# Patient Record
(demographics unavailable — no encounter records)

---

## 2024-10-08 NOTE — ASSESSMENT
[FreeTextEntry1] : Patient is a 71, y/o gentleman, diagnosed with AML s/p one line of therapy.    Patient's history, pathology, imaging, and plan was reviewed. Discussed risks and benefits of allogenetic HSCT transplant at great length based on the ASTCT practice guidelines.   The patient is an excellent candidate for transplant and has good social support. We spoke to them at length regarding their disease, treatment options, and the role of HSCT in the treatment of their AML and the expected morbidity and mortality as well as impact on disease-free, overall survival.   We discussed pre-transplant work up, stem cell donor identification, donor collection of stem cells, conditioning chemotherapy, GvHD prophylaxis, allogeneic stem cell transplant hospital admission, expected hospital course, side effects of treatment, discharge medication, risk of relapse and death post-transplant, post-transplant outpatient follow ups and precautions.     The patient has verbalized understanding, their questions have been answered. At this time, [place] would like to proceed with HSCT transplant. We encouraged the patient to take the time to read our transplant book. They will be referred to our  for a psycho/social evaluation prior to transplant. HLA typing for the patient was not performed during the initial consult. We will refer the patient for dental evaluation and clearance.     Completed cycle 2 of dac/leonila, cycle 3 pending count recovery. HSCT admission planned for 11/5 for MUD transplant, ABC trial discussed at length.  The patient verbalized understanding of the plan. We will reach out to their primary hematologist, Dr. Anderson with our recommendations. The patient was encouraged to contact us with any questions or concerns.     Follow-up:   RTC 3 wks  ------   Seen and examined with [facility]   Adult Transplantation and Cellular Therapy Program   I had the pleasure of seeing the patient.  I reviewed the interim history I reviewed his records in details and reviewed the above note.  The patient has been doing well and tolerating his second cycle of therapy.  We have identified a suitable MUD.  The plan is to proceed with admission on November 5th pending the results of his repeat BM studies.  We discussed today the option of being enrolled in the ABC trial for GvHD prevention.  I answered the patient's questions. I spent 25 minutes performing the above tasks.  MARISABEL Mena MD

## 2024-10-08 NOTE — REVIEW OF SYSTEMS
[Negative] : Psychiatric [Fever] : no fever [Chills] : no chills [Night Sweats] : no night sweats [Fatigue] : no fatigue

## 2024-10-08 NOTE — PHYSICAL EXAM
[Fully active, able to carry on all pre-disease performance without restriction] : Status 0 - Fully active, able to carry on all pre-disease performance without restriction [Normal] : no JVD, no calf tenderness, venous stasis changes, varices [de-identified] : room air

## 2024-10-08 NOTE — REASON FOR VISIT
[Follow-Up Visit] : a follow-up visit for [Acute Myeloid Leukemia] : acute myeloid leukemia [Spouse] : spouse [FreeTextEntry2] : Pretransplant evaluation

## 2024-10-15 NOTE — REASON FOR VISIT
[Bone Marrow Biopsy] : bone marrow biopsy [Bone Marrow Aspiration] : bone marrow aspiration [FreeTextEntry2] : AML

## 2024-10-15 NOTE — PROCEDURE
[Bone Marrow Biopsy] : bone marrow biopsy [Bone Marrow Aspiration] : bone marrow aspiration  [Patient] : the patient [Verbal Consent Obtained] : verbal consent was obtained prior to the procedure [Patient identification verified] : patient identification verified [Procedure verified and consent obtained] : procedure verified and consent obtained [Laterality verified and correct site marked] : laterality verified and correct site marked [Left] : site: left [Correct positioning] : correct positioning [Prone] : prone [Superior iliac spine was identified] : the superior iliac spine was identified. [The left posterior iliac crest was prepped with betadine and draped, using sterile technique.] : The left posterior iliac crest was prepped with betadine and draped, using sterile technique. [Lidocaine was injected and into the periosteum overlying the site.] : Lidocaine was injected and into the periosteum overlying the site. [Aspirate] : aspirate [Cytogenetics] : cytogenetics [FISH] : FISH [Biopsy] : biopsy [Flow Cytometry] : flow cytometry [] : The patient was instructed to remove the bandage the following AM. The patient may bathe. Acetaminophen may be taken for discomfort, as per package directions.If there are any other problems, the patient was instructed to call the office. The patient verbalized understanding, and is aware of the office contact numbers. [FreeTextEntry1] : AML [FreeTextEntry2] : 8 cc of lidocaine was used for the procedure.   WBC: 4.58 Hgb: 10.5 Hct: 29.4 Plts: 38  Bone marrow aspiration and biopsy were done. MDS, AML and [facility] Myeloid panel requested

## 2024-10-29 NOTE — PHYSICAL EXAM
[Fully active, able to carry on all pre-disease performance without restriction] : Status 0 - Fully active, able to carry on all pre-disease performance without restriction [Normal] : affect appropriate [de-identified] : room air

## 2024-10-29 NOTE — HISTORY OF PRESENT ILLNESS
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 He is being treated with decitabine and venetoclax, cycle 2 started 8/26/24. He presents to finalize plans for an allogeneic HSCT in the management of their AML, s/p one line of treatment. Patient is accompanied by his wife.   ----------------------PmHX:----------------------------------------  BPH  --------------------Oncologic Hx: ---------------------------------  He had been well, exercising regularly, three miles/day. By early 7/2024, he had developed NGUYEN on climbing stairs. Pancytopenia with circulating blasts were detected and he was admitted to [facility] 7/18/2024. 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  --------------------Interval Hx: -------------------------------------  Patient is feeling well overall, walking on the track 2-3 miles/day. 9/17/24: Patient is feeling well, he's walking three miles/day, 19 minute mile pace.  S/p 3 cycles of therapy  Denies n/v/d, fever, rash, chills, pain.   --------------------Treatment Hx: ---------------------------------  Decitabine and venetoclax 100mg 21d   ------------------Imaging Hx:---------------------------------------  CT C/A/P showed small, non specific lung nodules and prostatomegaly.  --------------Bone marrow Biopsy Results: ------------------  Bone Marrow Biopsy, Clot and Bone Marrow Aspirate, Right PIC -MDS/AML with mutated TP53 ( per International Consensus  Classification of Myeloid Neoplasms and Acute Leukemias) -MDS with biallelic TP53 inactivation (WHO-RVVC1pf ed). Hypercellular marrow for age with multilineage dysplasia, and increased blasts/immature cells (12% on aspirate differential count, 10-15% by CD34 IHC stain). -Abnormal Karyotype 46-47,XY,del(7)(q11.2),+8,del(8)(q13q22)x2,del(9)(q13q22),-11,-12, add(17)(p11.2),del(20)(q11.2q13.3),+1-2mar           {cp13                         \}/46,XY           {7                         \} -FISH studies detected 7q deletion (60%), Trisomy 8 (62.5%), 20q deletion (59.5%) and TP53 deletion (55.5%). -[facility] Myeloid Report detected (Tier I) TP53 p.Cig463Uip (VAF38%) and U2AF1 p.Siu006Wug (VAF32%). There are increased blasts/immature cells (12% on aspirate differential count and approximately 10-15% of the cellularity by CD34 immunohistochemical stain).  8/15/24:  Bone marrow biopsy and bone marrow aspirate      -  Cellular marrow (30-40%) with  markedly decreased myelopoiesis, decreased left shifted erythropoiesis and relatively normal megakaryopoiesis with no increase in blast population     -  Persistent 7q deletion (1%), trisomy 8 (2.5%), 20q deletion (2.5%) and TP53 deletion (0.5%)  by FISH studies, below the cut-off values    _ Persistent mutations in    TP53 p.Ajr705Nkt and U2AF1 p.Hwf450Hvq at low VAF levels  ----------------------Social Hx: ------------------------------------   The patient is  and has 2 daughters, 30 and 27y/o local in [place]  Patient has one brother (69)  Work: Nephrologist  Born in:   Never a smoker/Social alcohol use   ------------------Surgical History: -------------------------------  partial mastoidectomy and requires periodic left mastoid debridement.  -----------------Past family history:------------------------------  noncontributory

## 2024-10-29 NOTE — HISTORY OF PRESENT ILLNESS
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 He is being treated with decitabine and venetoclax, cycle 2 started 8/26/24. He presents to finalize plans for an allogeneic HSCT in the management of their AML, s/p one line of treatment. Patient is accompanied by his wife.   ----------------------PmHX:----------------------------------------  BPH  --------------------Oncologic Hx: ---------------------------------  He had been well, exercising regularly, three miles/day. By early 7/2024, he had developed NGUYEN on climbing stairs. Pancytopenia with circulating blasts were detected and he was admitted to [facility] 7/18/2024. 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  --------------------Interval Hx: -------------------------------------  Patient is feeling well overall, walking on the track 2-3 miles/day. 9/17/24: Patient is feeling well, he's walking three miles/day, 19 minute mile pace.  S/p 3 cycles of therapy  Denies n/v/d, fever, rash, chills, pain.   --------------------Treatment Hx: ---------------------------------  Decitabine and venetoclax 100mg 21d   ------------------Imaging Hx:---------------------------------------  CT C/A/P showed small, non specific lung nodules and prostatomegaly.  --------------Bone marrow Biopsy Results: ------------------  Bone Marrow Biopsy, Clot and Bone Marrow Aspirate, Right PIC -MDS/AML with mutated TP53 ( per International Consensus  Classification of Myeloid Neoplasms and Acute Leukemias) -MDS with biallelic TP53 inactivation (WHO-NJIF1qd ed). Hypercellular marrow for age with multilineage dysplasia, and increased blasts/immature cells (12% on aspirate differential count, 10-15% by CD34 IHC stain). -Abnormal Karyotype 46-47,XY,del(7)(q11.2),+8,del(8)(q13q22)x2,del(9)(q13q22),-11,-12, add(17)(p11.2),del(20)(q11.2q13.3),+1-2mar           {cp13                         \}/46,XY           {7                         \} -FISH studies detected 7q deletion (60%), Trisomy 8 (62.5%), 20q deletion (59.5%) and TP53 deletion (55.5%). -On\[facility]\"" Advanced NGS Myeloid Report detected (Tier I) TP53 p.Izp266Hdj (VAF38%) and U2AF1 p.Vik265Rio (VAF32%). There are increased blasts/immature cells (12% on aspirate differential count and approximately 10-15% of the cellularity by CD34 immunohistochemical stain).  8/15/24:  Bone marrow biopsy and bone marrow aspirate      -  Cellular marrow (30-40%) with  markedly decreased myelopoiesis, decreased left shifted erythropoiesis and relatively normal megakaryopoiesis with no increase in blast population     -  Persistent 7q deletion (1%), trisomy 8 (2.5%), 20q deletion (2.5%) and TP53 deletion (0.5%)  by FISH studies, below the cut-off values    _ Persistent mutations in    TP53 p.Ugb317Seq and U2AF1 p.Jxa828Zgb at low VAF levels  ----------------------Social Hx: ------------------------------------   The patient is  and has 2 daughters, 30 and 27y/o local in [place]  Patient has one brother (69)  Work: Nephrologist  Born in:   Never a smoker/Social alcohol use   ------------------Surgical History: -------------------------------  partial mastoidectomy and requires periodic left mastoid debridement.  -----------------Past family history:------------------------------  noncontributory

## 2024-10-29 NOTE — PHYSICAL EXAM
[Fully active, able to carry on all pre-disease performance without restriction] : Status 0 - Fully active, able to carry on all pre-disease performance without restriction [Normal] : affect appropriate [de-identified] : room air

## 2024-10-30 NOTE — ASSESSMENT
[FreeTextEntry1] :   Patient is starting conditioning in preparation for allogeneic HSC transplant for treatment of disease.   Donor type: MUD            Mismatches: DP non permissive            Stem cell source: PB   CMV Status           Recipient: +           Donor: -   ABO Recipient: A+ Donor: O+ Incompatibility: Minor   The planned conditioning regimen is: Flu Bu 2 (LIV) rATG   The planned GvHD prophylaxis is: ABC   The indication per ASTCT guidelines is: S   In terms of disease staging, the patient has received treatment and achieved disease status at transplant; CR1 Minimal residual disease marker: None Planned therapy post-transplant: None   The Karnofsky performance status is: 80% Comorbid conditions: None The pre transplant HCT CI score is: 0 DRI: High   The patient has been counseled with regard to fertility issues: Yes With regard to contraception and uterine bleeding prevention during post-transplant period, the issues has been addressed: N/A   I have signed the checklist. I have verified that the allogeneic source of HSCT has been secured and cleared. The rationale to proceed has been discussed with the patient. The expected morbidity and mortality were discussed. The expected outcomes have been discussed. The patient understands the needs for compliance. The informed consent has been obtained. The insurance preauthorization has been obtained. [facility]. MARISABEL Mena MD

## 2024-10-30 NOTE — ASSESSMENT
[FreeTextEntry1] :   Patient is starting conditioning in preparation for allogeneic HSC transplant for treatment of disease.   Donor type: MUD            Mismatches: DP non permissive            Stem cell source: PB   CMV Status           Recipient: +           Donor: -   ABO Recipient: A+ Donor: O+ Incompatibility: Minor   The planned conditioning regimen is: Flu Bu 2 (LIV) rATG   The planned GvHD prophylaxis is: ABC   The indication per ASTCT guidelines is: S   In terms of disease staging, the patient has received treatment and achieved disease status at transplant; CR1 Minimal residual disease marker: None Planned therapy post-transplant: None   The Karnofsky performance status is: 80% Comorbid conditions: None The pre transplant HCT CI score is: 0 DRI: High   The patient has been counseled with regard to fertility issues: Yes With regard to contraception and uterine bleeding prevention during post-transplant period, the issues has been addressed: N/A   I have signed the checklist. I have verified that the allogeneic source of HSCT has been secured and cleared. The rationale to proceed has been discussed with the patient. The expected morbidity and mortality were discussed. The expected outcomes have been discussed. The patient understands the needs for compliance. The informed consent has been obtained. The insurance preauthorization has been obtained. [facility]. MARISABEL Mena MD

## 2024-11-29 NOTE — ASSESSMENT
[FreeTextEntry1] : 72 yo with pmhx of AML,  s/p HSCT transplant on 11/12/24. Today is day +17   Disease:  --> AML s/p allogeneic stem cell transplant (MUD) ----------Ds status post transplant: pending  ------------------By MRD monitoring: N/A ------------------By post Chimerism: N/A ----------Post transplant maintenance therapy: N/A ------------------Indication: N/A ----------DLI given:  Disease monitoring: --> Post transplant bone marrow biopsy will need to be scheduled on Day +30, Day+ 100, and Day +180.   Engraftment: ------Chimerism: pending from 11/26/24 ------ANC engraftment date: 11/26/24 ------Platelets engraftment date: Likely 11/28/24, pending 2 additional measurements  ------ABO Rh incompatibility issues:  -->Plan: ------G-CSF support: no ------Transfusions requirements: No ----------------Transfuse if platelets <10K or actively bleeding per SOP. ------Chimerism: monitor every other week and on marrow until d+180, last performed 11/26 ------Immune reconstitution due on days +100, + 180, + 365   GvHD: -------Acute: No -------Chronic: N/A -------Steroid Taper: N/A -------ECP: N/A -->Plan: ------Reviewed GvHD signs and symptoms to report ------Prophylaxis with ABCregimen. -----------PTcy -----------Abatacept IV 10mg/kg on days +28 (12/10)    ID: ---Monitoring: Send CMV PCR, Adenovirus and EBV weekly until day +180 ----------CMV:neg; will repeat 12/2 ----------EBV: will repeat 12/2 ----------Adeno: will repeat 12/2 --->Continue ppx: ---------PCP: bactrim  ---------HSV and VZV: acyclovir 800mg PO BID  ---------Fungal: posaconazole 300mg PO daily  ---------CMV: letermovir 480 mg PO daily ---------If chronic GvHD present, encapsulated organism coverage: N/A  Survivorship: - 25-hydroxyclaciferol (day +80): - TSH (annually): - Ferritin (annually): - Fertility (annual, x2 if applicable): - Bone mineral densitometry (day +365 for women, men exposed to prolonged steroids and CNI): - Vaccinations per SOP starting at day 180+ except for Flu at day + 90 and COVID per CDC guidelines.   Other: Continue Zofran PRN Ursodiol 300 mg BID ( until day 30-60) for SOS prophylaxis Encouraged to increase PO intake as tolerated Initiated on potassium supplementation - 20 mEq daily; continue as needed Pt has held losartan due to decreased BPs at home; discuss with team to evaluate need for medication over coming weeks  Continue post transplant diet and crowd restrictions. Questions and concerns addressed. Reviewed signs and symptoms to report to clinic; patient has clinic and after hours number  RTC 12/3 labs, hydration as needed 12/5 labs, provider visit with Dr. Bay, hydration prn

## 2024-11-29 NOTE — HISTORY OF PRESENT ILLNESS
[de-identified] : 71 year old s/p HSCT for AML    Status post allogeneic transplant, day +17 Transplant physician: Dr. Bay  Referring physician:  Diagnosis: AML  Disease staging at transplant: CR Conditioning regimen: LIV BuFluATG GvHD prophylaxis: PTCyBorAba (ABC) on study  Donor type: MUD                    Mismatches: N/A, 10/10                    Stem cell source: Peripheral  ABO         Recipient: A positive         Donor: O positive          Incompatibility: Minor  CMV Status  Recipient: Positive  Donor: Negative Toxoplasmosis Status  Recipient: Negative   Donor: Negative    PMH:  BPH, AML     -----------------Oncologic Hx:-------------------------------- He had been well, exercising regularly, three miles/day. By early 7/2024, he had developed NGUYEN on climbing stairs. Pancytopenia with circulating blasts were detected and he was admitted to [facility] 7/18/2024. 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 He was treated with decitabine and venetoclax x 3 cycles.    -----------Transplant hospital course:---------------------  Tolerated well, c/b increased BP due to post transplant cytoxan fluids. Additionally c/b hematuria, interstitial mucositis.  -----Day 0 = 11/12/24  --------------Post Transplant Course:-----------------------     -------------Oncological treatments:-------------------------- Decitabine and venetoclax 100mg 21d      ---------------Bone Marrow Bx Results:---------------------- Bone Marrow Biopsy, Clot and Bone Marrow Aspirate, Right PIC -MDS/AML with mutated TP53 ( per International Consensus  Classification of Myeloid Neoplasms and Acute Leukemias) -MDS with biallelic TP53 inactivation (WHO-RHPN9mh ed). Hypercellular marrow for age with multilineage dysplasia, and increased blasts/immature cells (12% on aspirate differential count, 10-15% by CD34 IHC stain). -Abnormal Karyotype 46-47,XY,del(7)(q11.2),+8,del(8)(q13q22)x2,del(9)(q13q22),-11,-12, add(17)(p11.2),del(20)(q11.2q13.3),+1-2mar  {cp13   }/46,XY  {7   } -FISH studies detected 7q deletion (60%), Trisomy 8 (62.5%), 20q deletion (59.5%) and TP53 deletion (55.5%). -[facility] Advanced NGS Myeloid Report detected (Tier I) TP53 p.Qev113Fom (VAF38%) and U2AF1 p.Jmj027Xgu (VAF32%). There are increased blasts/immature cells (12% on aspirate differential count and approximately 10-15% of the cellularity by CD34 immunohistochemical stain).  8/15/24:  Bone marrow biopsy and bone marrow aspirate  - Cellular marrow (30-40%) with markedly decreased myelopoiesis, decreased left shifted erythropoiesis and relatively normal megakaryopoiesis with no increase in blast population  - Persistent 7q deletion (1%), trisomy 8 (2.5%), 20q deletion (2.5%) and TP53 deletion (0.5%) by FISH studies, below the cut-off values  _ Persistent mutations in TP53 p.Vsw833Zmj and U2AF1 p.Otf510Unt at low VAF levels   10/15/24 (Pretransplant BmBx) ----Morphology: Normocellular bone marrow with trilineage regeneration (focal myeloid predominant, focal erythroid predominant, overall decreased megakaryocytes with focally normal numbers and normal morphology, and increased iron stores (history of MDS/AML with complex karyotype and TP53 deletion, under treatment)      - No morphologic or immunophenotypic findings of bone marrow involvement by leukemia are identified ----Karyotype: Normal ----FISH: deletion of TP53 below cut off value  ----NGS: Normal    - Day 30 post transplant ----Morphology: ----Karyotype ----FISH ----NGS:   - Day 100 post transplant ----Morphology: ----Karyotype ----FISH ----NGS:   - Day 180 post transplant ----Morphology: ----Karyotype ----FISH ----NGS:   ---------Past Surgical Hx---------- Partial mastoidectomy and requires periodic left mastoid debridement  -------- -Social Hx ----------------- The patient is  and has 2 daughters, 30 and 27y/o local in [place]  Patient has one brother (69)  Work: Nephrologist  Born in: [place]  Never a smoker/Social alcohol use  ----------Past Family Hx:------------ Noncontributory  [de-identified] : 11/29/24: Presents for follow up post hospital discharge. He reports overall fatigue. He is eating small meals and drinking ~1.5L. He reports improvement with PO intake as well as urination since returning home. He was initiated on losaratan while inpatient but reports holding it since returning home due to improvement in blood pressure.He denies any fever, chills, SOB, new rash, N/V/D. Of note, he did not bring his medications to clinic - advised to bring pill bottles/box to each visit, verbalizes understanding.

## 2024-12-04 NOTE — HISTORY OF PRESENT ILLNESS
[de-identified] : 71 year old s/p HSCT for AML    Status post allogeneic transplant, day +22 Transplant physician: Dr. Bay  Referring physician:  Diagnosis: AML  Disease staging at transplant: CR Conditioning regimen: LIV BuFluATG GvHD prophylaxis: PTCyBorAba (ABC) on study  Donor type: MUD                    Mismatches: N/A, 10/10                    Stem cell source: Peripheral  ABO         Recipient: A positive         Donor: O positive          Incompatibility: Minor  CMV Status  Recipient: Positive  Donor: Negative Toxoplasmosis Status  Recipient: Negative   Donor: Negative    PMH:  BPH, AML     -----------------Oncologic Hx:-------------------------------- He had been well, exercising regularly, three miles/day. By early 7/2024, he had developed NGUYEN on climbing stairs. Pancytopenia with circulating blasts were detected and he was admitted to [facility] 7/18/2024. 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 He was treated with decitabine and venetoclax x 3 cycles.    -----------Transplant hospital course:---------------------  Tolerated well, c/b increased BP due to post transplant cytoxan fluids. Additionally c/b hematuria, interstitial mucositis.  -----Day 0 = 11/12/24  --------------Post Transplant Course:-----------------------     -------------Oncological treatments:-------------------------- Decitabine and venetoclax 100mg 21d      ---------------Bone Marrow Bx Results:---------------------- Bone Marrow Biopsy, Clot and Bone Marrow Aspirate, Right PIC -MDS/AML with mutated TP53 ( per International Consensus  Classification of Myeloid Neoplasms and Acute Leukemias) -MDS with biallelic TP53 inactivation (WHO-OSZC8dh ed). Hypercellular marrow for age with multilineage dysplasia, and increased blasts/immature cells (12% on aspirate differential count, 10-15% by CD34 IHC stain). -Abnormal Karyotype 46-47,XY,del(7)(q11.2),+8,del(8)(q13q22)x2,del(9)(q13q22),-11,-12, add(17)(p11.2),del(20)(q11.2q13.3),+1-2mar   {cp13      }/46,XY   {7      } -FISH studies detected 7q deletion (60%), Trisomy 8 (62.5%), 20q deletion (59.5%) and TP53 deletion (55.5%). -OnkoSit Advanced NGS Myeloid Report detected (Tier I) TP53 p.Gsc021Hmq (VAF38%) and U2AF1 p.Swj151Fkg (VAF32%). There are increased blasts/immature cells (12% on aspirate differential count and approximately 10-15% of the cellularity by CD34 immunohistochemical stain).  8/15/24:  Bone marrow biopsy and bone marrow aspirate  - Cellular marrow (30-40%) with markedly decreased myelopoiesis, decreased left shifted erythropoiesis and relatively normal megakaryopoiesis with no increase in blast population  - Persistent 7q deletion (1%), trisomy 8 (2.5%), 20q deletion (2.5%) and TP53 deletion (0.5%) by FISH studies, below the cut-off values  _ Persistent mutations in TP53 p.Uox574Ssq and U2AF1 p.Nhv648Rng at low VAF levels   10/15/24 (Pretransplant BmBx) ----Morphology: Normocellular bone marrow with trilineage regeneration (focal myeloid predominant, focal erythroid predominant, overall decreased megakaryocytes with focally normal numbers and normal morphology, and increased iron stores (history of MDS/AML with complex karyotype and TP53 deletion, under treatment)      - No morphologic or immunophenotypic findings of bone marrow involvement by leukemia are identified ----Karyotype: Normal ----FISH: deletion of TP53 below cut off value  ----NGS: Normal    - Day 30 post transplant: Due 12/12/24  ----Morphology: ----Karyotype ----FISH ----NGS:   - Day 100 post transplant: Due 2/20/25 ----Morphology: ----Karyotype ----FISH ----NGS:   - Day 180 post transplant: Due 5/12/25 ----Morphology: ----Karyotype ----FISH ----NGS:   ---------Past Surgical Hx---------- Partial mastoidectomy and requires periodic left mastoid debridement  -------- -Social Hx ----------------- The patient is  and has 2 daughters, 30 and 27y/o local in [place]  Patient has one brother (69)  Work: Nephrologist  Born in:   Never a smoker/Social alcohol use  ----------Past Family Hx:------------ Noncontributory  [de-identified] : 11/29/24: Presents for follow up post hospital discharge. He reports overall fatigue. He is eating small meals and drinking ~1.5L. He reports improvement with PO intake as well as urination since returning home. He was initiated on losaratan while inpatient but reports holding it since returning home due to improvement in blood pressure.He denies any fever, chills, SOB, new rash, N/V/D. Of note, he did not bring his medications to clinic - advised to bring pill bottles/box to each visit, verbalizes understanding.   12/4/24: Presents for a follow up visit, Day +22 post-transplant. Overall, well and offers no acute concerns. He reports overall fatigue. Denies fever, vomiting, diarrhea, rash, mouth sores, nausea or any signs of active bleeding. Denies SOB or B/L LE edema.

## 2024-12-04 NOTE — ASSESSMENT
[FreeTextEntry1] : 72 yo with pmhx of AML,  s/p HSCT transplant on 11/12/24. Today is day +22   Disease:  --> AML s/p allogeneic stem cell transplant (MUD) ----------Ds status post transplant: pending  ------------------By MRD monitoring: N/A ------------------By post Chimerism: 11/26/24 chimerism is greater than 97% donor, recipient not detected. ----------Post transplant maintenance therapy: N/A ------------------Indication: N/A ----------DLI given: N/A Disease monitoring: --> Post transplant bone marrow biopsy will need to be scheduled on Day +30 ( 12/12/24), Day+ 100 (2/20/25), and Day +180 (5/12/25).   Engraftment: ------Chimerism: 11/26/24 chimerism is greater than 97% chimerism, recipient not detected. ------ANC engraftment date: 11/26/24 ------Platelets engraftment date: Likely 11/29/24. Received platelets on 11/22/24.  ------ABO Rh incompatibility issues:  -->Plan: ------G-CSF support: no ------Transfusions requirements: No ----------------Transfuse if platelets <10K or actively bleeding per SOP. ------Chimerism: monitor every other week and on marrow until d+180, last performed 11/26/24. ------Immune reconstitution due on days +100, + 180, + 365   GvHD: -------Acute: No -------Chronic: N/A -------Steroid Taper: N/A -------ECP: N/A -->Plan: ------Reviewed GvHD signs and symptoms to report ------Prophylaxis with ABCregimen. -----------PTcy -----------Abatacept IV 10mg/kg on days +28 (12/10)    ID: ---Monitoring: Send CMV PCR, Adenovirus and EBV weekly until day +180 ----------CMV: negative on 12/2/24 ----------EBV: negative on 12/2/24 ----------Adeno: negative on 11/22/24. pending on 12/2/24 --->Continue ppx: ---------PCP: bactrim  ---------HSV and VZV: acyclovir 800mg PO BID  ---------Fungal: posaconazole 300mg PO daily  ---------CMV: letermovir 480 mg PO daily ---------If chronic GvHD present, encapsulated organism coverage: N/A  Survivorship: - 25-hydroxyclaciferol (day +80): - TSH (annually): - Ferritin (annually): - Fertility (annual, x2 if applicable): - Bone mineral densitometry (day +365 for women, men exposed to prolonged steroids and CNI): - Vaccinations per SOP starting at day 180+ except for Flu at day + 90 and COVID per CDC guidelines.   Other: Continue Zofran PRN Ursodiol 300 mg BID ( until day 30-60) for SOS prophylaxis Encouraged to increase PO intake as tolerated Continue potassium supplementation - 20 mEq daily; continue as needed Pt has held losartan due to decreased BPs at home; discuss with team to evaluate need for medication over coming weeks  Continue post transplant diet and crowd restrictions. Questions and concerns addressed. Reviewed signs and symptoms to report to clinic; patient has clinic and after hours number   RTC 12/10/24: Labs, Provider visit with Dr Jimenez, Hydration prn.   AML  Donor type: MUD  Mismatches: DP non permissive  Stem cell source: PB  CMV Status  Recipient: +  Donor: -  ABO Recipient: A+ Donor: O+ Incompatibility: Minor  Conditioning regimen is: Flu Bu 2 (LIV) rATG  GvHD prophylaxis is: ABC  Comorbid conditions: None The pre transplant HCT CI score is: 0 DRI: High  I saw and examined the patient myself on day + following his allogeneic HSCT for AML with poor risk features.  The patient is doing well with no specific complaints. He has no fever. He has no skin rash. He has 1 BM daily of soft stools.  His examination is negative.  He has engrafted with recovering counts and full donor chimerism.  He has no aGvHD. He will receive his last dose of abatacept next week.  He has no ID issues. He is on appropriate prophylaxis.  He is scheduled for repat BM studies on around day +30. There are no other issues. I spent a total of 35 minutes performing the above tasks.  MARISABEL Mena MD

## 2024-12-10 NOTE — ASSESSMENT
[FreeTextEntry1] : 72 yo with pmhx of AML,  s/p HSCT transplant on 11/12/24. Today is day +28   Disease:  --> AML s/p allogeneic stem cell transplant (MUD) ----------Ds status post transplant: pending  ------------------By MRD monitoring: N/A ------------------By post Chimerism: 11/26/24 chimerism is greater than 97% donor, recipient not detected. ----------Post transplant maintenance therapy: N/A ------------------Indication: N/A ----------DLI given: N/A Disease monitoring: --> Post transplant bone marrow biopsy will need to be scheduled on Day +30 (12/12/24), Day+ 100 (2/20/25), and Day +180 (5/12/25).   Engraftment: ------Chimerism: 11/26/24 chimerism is greater than 97% chimerism, recipient not detected. ------ANC engraftment date: 11/26/24 ------Platelets engraftment date: Likely 11/29/24. Received platelets on 11/22/24.  ------ABO Rh incompatibility issues:  -->Plan: ------G-CSF support: no ------Transfusions requirements: No ----------------Transfuse if platelets <10K or actively bleeding per SOP. ------Chimerism: monitor every other week and on marrow until d+180, last performed 11/26/24. ------Immune reconstitution due on days +100, + 180, + 365   GvHD: -------Acute: No -------Chronic: N/A -------Steroid Taper: N/A -------ECP: N/A -->Plan: ------Reviewed GvHD signs and symptoms to report ------Prophylaxis with ABCregimen. -----------PTcy -----------Abatacept IV 10mg/kg on days +28 (12/10)    ID: ---Monitoring: Send CMV PCR, Adenovirus and EBV weekly until day +180 ----------CMV: negative on 12/2/24 ----------EBV: negative on 12/2/24 ----------Adeno: negative on 11/22/24. pending on 12/2/24 --->Continue ppx: ---------PCP: bactrim  ---------HSV and VZV: acyclovir 800mg PO BID  ---------Fungal: posaconazole 300mg PO daily  ---------CMV: letermovir 480 mg PO daily ---------If chronic GvHD present, encapsulated organism coverage: N/A  Survivorship: - 25-hydroxyclaciferol (day +80): - TSH (annually): - Ferritin (annually): - Fertility (annual, x2 if applicable): - Bone mineral densitometry (day +365 for women, men exposed to prolonged steroids and CNI): - Vaccinations per SOP starting at day 180+ except for Flu at day + 90 and COVID per CDC guidelines.   Other: Continue Zofran PRN Ursodiol 300 mg BID ( until day 30-60) for SOS prophylaxis Encouraged to increase PO intake as tolerated Continue potassium supplementation - 20 mEq daily; continue as needed Pt has held losartan due to decreased BPs at home; discuss with team to evaluate need for medication over coming weeks  Continue post transplant diet and crowd restrictions. Questions and concerns addressed. Reviewed signs and symptoms to report to clinic; patient has clinic and after hours number  RTC 12/12 bone marrow biopsy 12/17 labs, provider visit   AML  Donor type: MUD  Mismatches: DP non permissive  Stem cell source: PB  CMV Status  Recipient: +  Donor: -  ABO Recipient: A+ Donor: O+ Incompatibility: Minor  Conditioning regimen is: Flu Bu 2 (LIV) rATG  GvHD prophylaxis is: ABC  Comorbid conditions: None The pre transplant HCT CI score is: 0 DRI: High  I saw and examined the patient myself on day +28 following his allogeneic HSCT for AML with poor risk features.  The patient is doing well with no specific complaints. He has no fever. He has no skin rash. He has 1 BM daily of soft stools.  His examination is negative.  He has engrafted with recovering counts and full donor chimerism.  He has no aGvHD. He will receive his last dose of abatacept today.  He has no ID issues. He is on appropriate prophylaxis.  He is scheduled for repat BM studies on around day +30. There are no other issues. I spent a total of 35 minutes performing the above tasks.  MARISABEL Mena MD   none

## 2024-12-10 NOTE — HISTORY OF PRESENT ILLNESS
[de-identified] : 71 year old s/p HSCT for AML    Status post allogeneic transplant, day +28 Transplant physician: Dr. Bay  Referring physician:  Diagnosis: AML  Disease staging at transplant: CR Conditioning regimen: LIV BuFluATG GvHD prophylaxis: PTCyBorAba (ABC) on study  Donor type: MUD                    Mismatches: N/A, 10/10                    Stem cell source: Peripheral  ABO         Recipient: A positive         Donor: O positive          Incompatibility: Minor  CMV Status  Recipient: Positive  Donor: Negative Toxoplasmosis Status  Recipient: Negative   Donor: Negative    PMH:  BPH, AML     -----------------Oncologic Hx:-------------------------------- He had been well, exercising regularly, three miles/day. By early 7/2024, he had developed NGUYEN on climbing stairs. Pancytopenia with circulating blasts were detected and he was admitted to [facility] 7/18/2024. 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 He was treated with decitabine and venetoclax x 3 cycles.    -----------Transplant hospital course:---------------------  Tolerated well, c/b increased BP due to post transplant cytoxan fluids. Additionally c/b hematuria, interstitial mucositis.  -----Day 0 = 11/12/24  --------------Post Transplant Course:-----------------------   -------------Oncological treatments:-------------------------- Decitabine and venetoclax 100mg 21d   ---------------Bone Marrow Bx Results:---------------------- Bone Marrow Biopsy, Clot and Bone Marrow Aspirate, Right PIC -MDS/AML with mutated TP53 ( per International Consensus  Classification of Myeloid Neoplasms and Acute Leukemias) -MDS with biallelic TP53 inactivation (WHO-VOXI5jf ed). Hypercellular marrow for age with multilineage dysplasia, and increased blasts/immature cells (12% on aspirate differential count, 10-15% by CD34 IHC stain). -Abnormal Karyotype 46-47,XY,del(7)(q11.2),+8,del(8)(q13q22)x2,del(9)(q13q22),-11,-12, add(17)(p11.2),del(20)(q11.2q13.3),+1-2mar   {cp13      \\}/46,XY   {7      \\} -FISH studies detected 7q deletion (60%), Trisomy 8 (62.5%), 20q deletion (59.5%) and TP53 deletion (55.5%). -OnkoSit Advanced NGS Myeloid Report detected (Tier I) TP53 p.Noz872Gar (VAF38%) and U2AF1 p.Ioi936Jdm (VAF32%). There are increased blasts/immature cells (12% on aspirate differential count and approximately 10-15% of the cellularity by CD34 immunohistochemical stain).  8/15/24:  Bone marrow biopsy and bone marrow aspirate  - Cellular marrow (30-40%) with markedly decreased myelopoiesis, decreased left shifted erythropoiesis and relatively normal megakaryopoiesis with no increase in blast population  - Persistent 7q deletion (1%), trisomy 8 (2.5%), 20q deletion (2.5%) and TP53 deletion (0.5%) by FISH studies, below the cut-off values  _ Persistent mutations in TP53 p.Huj778Daj and U2AF1 p.Qsn072Dlv at low VAF levels   10/15/24 (Pretransplant BmBx) ----Morphology: Normocellular bone marrow with trilineage regeneration (focal myeloid predominant, focal erythroid predominant, overall decreased megakaryocytes with focally normal numbers and normal morphology, and increased iron stores (history of MDS/AML with complex karyotype and TP53 deletion, under treatment)      - No morphologic or immunophenotypic findings of bone marrow involvement by leukemia are identified ----Karyotype: Normal ----FISH: deletion of TP53 below cut off value  ----NGS: Normal    - Day 30 post transplant: Due 12/12/24  ----Morphology: ----Karyotype ----FISH ----NGS:   - Day 100 post transplant: Due 2/20/25 ----Morphology: ----Karyotype ----FISH ----NGS:   - Day 180 post transplant: Due 5/12/25 ----Morphology: ----Karyotype ----FISH ----NGS:   ---------Past Surgical Hx---------- Partial mastoidectomy and requires periodic left mastoid debridement  -------- -Social Hx ----------------- The patient is  and has 2 daughters, 30 and 29y/o local in [place]  Patient has one brother (69)  Work: Nephrologist  Born in:   Never a smoker/Social alcohol use  ----------Past Family Hx:------------ Noncontributory  [de-identified] : 12/9/24: Presents for a follow up visit, Day +28 post-transplant. Overall, well with no major complaints. He reports overall fatigue that has improved. Denies fever, vomiting, diarrhea, rash, mouth sores, nausea or any signs of active bleeding. Denies SOB or B/L LE edema.

## 2024-12-17 NOTE — HISTORY OF PRESENT ILLNESS
[de-identified] : 71 year old s/p HSCT for AML    Status post allogeneic transplant, day +28 Transplant physician: Dr. Bay  Referring physician:  Diagnosis: AML  Disease staging at transplant: CR Conditioning regimen: LIV BuFluATG GvHD prophylaxis: PTCyBorAba (ABC) on study  Donor type: MUD                    Mismatches: N/A, 10/10                    Stem cell source: Peripheral  ABO         Recipient: A positive         Donor: O positive          Incompatibility: Minor  CMV Status  Recipient: Positive  Donor: Negative Toxoplasmosis Status  Recipient: Negative   Donor: Negative    PMH:  BPH, AML     -----------------Oncologic Hx:-------------------------------- He had been well, exercising regularly, three miles/day. By early 7/2024, he had developed NGUYEN on climbing stairs. Pancytopenia with circulating blasts were detected and he was admitted to [facility] 7/18/2024. 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 He was treated with decitabine and venetoclax x 3 cycles.    -----------Transplant hospital course:---------------------  Tolerated well, c/b increased BP due to post transplant cytoxan fluids. Additionally c/b hematuria, interstitial mucositis.  -----Day 0 = 11/12/24  --------------Post Transplant Course:-----------------------   -------------Oncological treatments:-------------------------- Decitabine and venetoclax 100mg 21d   ---------------Bone Marrow Bx Results:---------------------- Bone Marrow Biopsy, Clot and Bone Marrow Aspirate, Right PIC -MDS/AML with mutated TP53 ( per International Consensus  Classification of Myeloid Neoplasms and Acute Leukemias) -MDS with biallelic TP53 inactivation (WHO-BNIT7wj ed). Hypercellular marrow for age with multilineage dysplasia, and increased blasts/immature cells (12% on aspirate differential count, 10-15% by CD34 IHC stain). -Abnormal Karyotype 46-47,XY,del(7)(q11.2),+8,del(8)(q13q22)x2,del(9)(q13q22),-11,-12, add(17)(p11.2),del(20)(q11.2q13.3),+1-2mar    {cp13          }/46,XY    {7          } -FISH studies detected 7q deletion (60%), Trisomy 8 (62.5%), 20q deletion (59.5%) and TP53 deletion (55.5%). -OnkoSit Advanced NGS Myeloid Report detected (Tier I) TP53 p.[id number] (VAF38%) and U2AF1 p.Dql249Tkv (VAF32%). There are increased blasts/immature cells (12% on aspirate differential count and approximately 10-15% of the cellularity by CD34 immunohistochemical stain).  8/15/24:  Bone marrow biopsy and bone marrow aspirate  - Cellular marrow (30-40%) with markedly decreased myelopoiesis, decreased left shifted erythropoiesis and relatively normal megakaryopoiesis with no increase in blast population  - Persistent 7q deletion (1%), trisomy 8 (2.5%), 20q deletion (2.5%) and TP53 deletion (0.5%) by FISH studies, below the cut-off values  _ Persistent mutations in TP53 p.Bhp080Pdt and U2AF1 p.Twg680Caq at low VAF levels   10/15/24 (Pretransplant BmBx) ----Morphology: Normocellular bone marrow with trilineage regeneration (focal myeloid predominant, focal erythroid predominant, overall decreased megakaryocytes with focally normal numbers and normal morphology, and increased iron stores (history of MDS/AML with complex karyotype and TP53 deletion, under treatment)      - No morphologic or immunophenotypic findings of bone marrow involvement by leukemia are identified ----Karyotype: Normal ----FISH: deletion of TP53 below cut off value  ----NGS: Normal    - Day 30 post transplant: Due 12/12/24  ----Morphology: ----Karyotype ----FISH ----NGS:   - Day 100 post transplant: Due 2/20/25 ----Morphology: ----Karyotype ----FISH ----NGS:   - Day 180 post transplant: Due 5/12/25 ----Morphology: ----Karyotype ----FISH ----NGS:   ---------Past Surgical Hx---------- Partial mastoidectomy and requires periodic left mastoid debridement  -------- -Social Hx ----------------- The patient is  and has 2 daughters, 30 and 27y/o local in [place]  Patient has one brother (69)  Work: Nephrologist  Born in:   Never a smoker/Social alcohol use  ----------Past Family Hx:------------ Noncontributory  [de-identified] : 12/17/24: Presents to treatment room for a follow up visit, Day +35 post-transplant. Overall, well with no major complaints. He reports that fatigue and po intake are improving. Endorses one formed bm/daily. Denies fever, vomiting, diarrhea, rash, mouth sores, nausea or any signs of active bleeding. Denies SOB or B/L LE edema. Taking all medications appropriately except for potassium which he self dc'd.

## 2024-12-17 NOTE — REASON FOR VISIT
[Bone Marrow Biopsy] : bone marrow biopsy [Bone Marrow Aspiration] : bone marrow aspiration [FreeTextEntry2] : AML, day 30 post Allo transplant

## 2024-12-17 NOTE — REASON FOR VISIT
[Follow-Up Visit] : a follow-up visit for [Acute Myeloid Leukemia] : acute myeloid leukemia [Spouse] : spouse [FreeTextEntry2] : s/p HSCT for AML

## 2024-12-17 NOTE — REVIEW OF SYSTEMS
[Fatigue] : fatigue [Negative] : Integumentary [SOB on Exertion] : shortness of breath during exertion [Fever] : no fever [Chills] : no chills [Night Sweats] : no night sweats [Vision Problems] : no vision problems [Nosebleeds] : no nosebleeds [Shortness Of Breath] : no shortness of breath [Vomiting] : no vomiting [Constipation] : no constipation [Dysuria] : no dysuria

## 2024-12-17 NOTE — ASSESSMENT
[FreeTextEntry1] : 72 yo with pmhx of AML, s/p HSCT transplant on 11/12/24. 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 ----------Post transplant maintenance therapy: N/A ------------------Indication: N/A ----------DLI given: N/A Disease monitoring: --> Post transplant bone marrow biopsy on Day +30 (completed 12/12/24), Day+ 100 (2/20/25), and Day +180 (5/12/25).   Engraftment: ------Chimerism: 11/26/24 chimerism is greater than 97% chimerism, recipient not detected. ------ANC engraftment date: 11/26/24 ------Platelets engraftment date: Likely 11/29/24, last received platelets on 11/22/24.  ------ABO Rh incompatibility issues:  -->Plan: ------G-CSF support: no ------Transfusions requirements: No ----------------Transfuse if platelets <10K or actively bleeding per SOP. ------Chimerism: monitor every other week and on marrow until d+180, last performed 11/26/24. ------Immune reconstitution due on days +100, + 180, + 365   GvHD: -------Acute: No -------Chronic: N/A -------Steroid Taper: N/A -------ECP: N/A -->Plan: ------Reviewed GvHD signs and symptoms to report ------Prophylaxis with ABC regimen. -----------PTcy -----------Abatacept IV 10mg/kg on days +28 (12/10)    ID: ---Monitoring: Send CMV PCR, Adenovirus and EBV weekly until day +180 ----------CMV: no reactivation noted as of 12/10/24 ----------EBV: no reactivation noted as of 12/10/24 ----------Adeno: no reactivation noted as of 12/10/24 --->Continue ppx: ---------PCP: bactrim ppx ---------HSV and VZV: acyclovir 800mg PO BID  ---------Fungal: posaconazole 300mg PO daily  ---------CMV: letermovir 480 mg PO daily ---------SOS prophylaxis: Ursodiol 300 mg BID (until day 30-60)  ---------If chronic GvHD present, encapsulated organism coverage: N/A  Survivorship: - 25-hydroxyclaciferol (day +80): - TSH (annually): - Ferritin (annually): - Fertility (annual, x2 if applicable): - Bone mineral densitometry (day +365 for women, men exposed to prolonged steroids and CNI): - Vaccinations per SOP starting at day 180+ except for Flu at day + 90 and COVID per CDC guidelines.   Other: Nausea Continue Zofran PRN; Encouraged to increase PO intake as tolerated  Hypokalemia Continue potassium supplementation - 20 mEq daily; restart today (K+ 3.4)  HTN Pt has held losartan due to decreased BPs at home; discuss with team to evaluate need for medication over coming weeks   Continue post transplant diet and crowd restrictions. Questions and concerns addressed. Reviewed signs and symptoms to report to clinic; patient has clinic and after hours number  RTC 12/24 labs, provider visit  12/31 labs, provider visit   Medina Walter NP [facility] Adult Transplantation and Cellular Therapy Program

## 2024-12-17 NOTE — PROCEDURE
[Bone Marrow Biopsy] : bone marrow biopsy [Bone Marrow Aspiration] : bone marrow aspiration  [Patient] : the patient [Verbal Consent Obtained] : verbal consent was obtained prior to the procedure [Patient identification verified] : patient identification verified [Procedure verified and consent obtained] : procedure verified and consent obtained [Laterality verified and correct site marked] : laterality verified and correct site marked [Right] : site: right [Correct positioning] : correct positioning [Prone] : prone [Superior iliac spine was identified] : the superior iliac spine was identified. [The right posterior iliac crest was prepped with betadine and draped, using sterile technique.] : The right posterior iliac crest was prepped with betadine and draped, using sterile technique. [Lidocaine was injected and into the periosteum overlying the site.] : Lidocaine was injected and into the periosteum overlying the site. [Aspirate] : aspirate [Cytogenetics] : cytogenetics [FISH] : FISH [Biopsy] : biopsy [Flow Cytometry] : flow cytometry [] : The patient was instructed to remove the bandage the following AM. The patient may bathe. Acetaminophen may be taken for discomfort, as per package directions.If there are any other problems, the patient was instructed to call the office. The patient verbalized understanding, and is aware of the office contact numbers. [FreeTextEntry1] : AML, day 30 post Allo transplant [FreeTextEntry2] : 8 cc of lidocaine was used for the procedure.   WBC: 4.81 Hgb: 9.6 Hct: 28.6 Plts: 141  Bone marrow aspiration and biopsy were done. MDS, AML, onkosight myeloid panel and chimerism requested

## 2024-12-31 NOTE — REVIEW OF SYSTEMS
[Fatigue] : fatigue [Negative] : Integumentary [Fever] : no fever [Chills] : no chills [Night Sweats] : no night sweats [Vision Problems] : no vision problems [Nosebleeds] : no nosebleeds [Shortness Of Breath] : no shortness of breath [SOB on Exertion] : no shortness of breath during exertion [Vomiting] : no vomiting [Constipation] : no constipation [Dysuria] : no dysuria

## 2024-12-31 NOTE — ASSESSMENT
[FreeTextEntry1] : 72 yo with pmhx of AML, s/p HSCT transplant on 11/12/24. 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 ----------Post transplant maintenance therapy: N/A ------------------Indication: N/A ----------DLI given: N/A Disease monitoring: --> Post transplant bone marrow biopsy on Day +30 (completed 12/12/24), Day+ 100 (2/20/25), and Day +180 (5/12/25).   Engraftment: ------Chimerism: 11/26/24 chimerism is greater than 97% chimerism, recipient not detected. ------ANC engraftment date: 11/26/24 ------Platelets engraftment date: Likely 11/29/24, last received platelets on 11/22/24.  ------ABO Rh incompatibility issues:  -->Plan: ------G-CSF support: no ------Transfusions requirements: No ----------------Transfuse if platelets <10K or actively bleeding per SOP. ------Chimerism: monitor every other week and on marrow until d+180, last performed 12/24/24. ------Immune reconstitution due on days +100, + 180, + 365   GvHD: -------Acute: No -------Chronic: N/A -------Steroid Taper: N/A -------ECP: N/A -->Plan: ------Reviewed GvHD signs and symptoms to report ------Prophylaxis with ABC regimen. -----------PTcy -----------Abatacept IV 10mg/kg on days +28 (12/10)    ID: ---Monitoring: Send CMV PCR, Adenovirus and EBV weekly until day +180 ----------CMV: no reactivation noted as of 12/24/24 ----------EBV: no reactivation noted as of 12/24/24 ----------Adeno: no reactivation noted as of 12/24/24 --->Continue ppx: ---------PCP: bactrim ppx ---------HSV and VZV: acyclovir 800mg PO BID  ---------Fungal: posaconazole 300mg PO daily  ---------CMV: letermovir 480 mg PO daily ---------SOS prophylaxis: Ursodiol 300 mg BID (until day 30-60)  ---------If chronic GvHD present, encapsulated organism coverage: N/A  Survivorship: - 25-hydroxyclaciferol (day +80): - TSH (annually): - Ferritin (annually): - Fertility (annual, x2 if applicable): - Bone mineral densitometry (day +365 for women, men exposed to prolonged steroids and CNI): - Vaccinations per SOP starting at day 180+ except for Flu at day + 90 and COVID per CDC guidelines.   Other: Nausea Continue Zofran PRN; Encouraged to increase PO intake as tolerated  Hypokalemia Continue potassium supplementation - 20 mEq daily  HTN Pt has held losartan due to decreased BPs at home; discuss with team to evaluate need for medication over coming weeks   Continue post transplant diet and crowd restrictions. Questions and concerns addressed. Reviewed signs and symptoms to report to clinic; patient has clinic and after hours number  RTC 12/31 labs, provider visit   Linette Houston NP [facility] Adult Transplantation and Cellular Therapy Program   Status post allogeneic transplant, day +42 Diagnosis: AML, complex cytogenetics and P53 mutation Disease staging at transplant: CR Conditioning regimen: Flu Bu (2) rATG GvHD prophylaxis: ABC Donor type: MUD  Stem cell source: Peripheral blodd ABO:  Recipient: A positive  Donor: O positive  Incompatibility: Minor CMV Status  Recipient: Positive  Donor: Negative Toxoplasmosis Status  Recipient: Negative  Donor: Negative   I saw and examined the patient myself.  The patient is doing well and has no complaints.  He has engrafted with full donor chimerism.  He has no aGvHD. He is off IS.  He has no ID issues. His viral studies are negative and is on appropriate prophylaxis.  His in in continuous CR. His BM studies are entirely negative including molecular studies. The patient will continue with the current care and return to clinic next week. I spent a total of 35 minutes performing the above tasks.  DONNA Salinas

## 2024-12-31 NOTE — HISTORY OF PRESENT ILLNESS
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 Bay  Referring physician:  Diagnosis: AML  Disease staging at transplant: CR Conditioning regimen: LIV BuFluATG GvHD prophylaxis: PTCyBorAba (ABC) on study  Donor type: MUD                    Mismatches: N/A, 10/10                    Stem cell source: Peripheral  ABO         Recipient: A positive         Donor: O positive          Incompatibility: Minor  CMV Status  Recipient: Positive  Donor: Negative Toxoplasmosis Status  Recipient: Negative   Donor: Negative    PMH:  BPH, AML     -----------------Oncologic Hx:-------------------------------- He had been well, exercising regularly, three miles/day. By early 7/2024, he had developed NGUYEN on climbing stairs. Pancytopenia with circulating blasts were detected and he was admitted to [facility] 7/18/20[facility]. 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 He was treated with decitabine and venetoclax x 3 cycles.    -----------Transplant hospital course:---------------------  Tolerated well, c/b increased BP due to post transplant cytoxan fluids. Additionally c/b hematuria, interstitial mucositis.  -----Day 0 = 11/12/24  --------------Post Transplant Course:-----------------------   -------------Oncological treatments:-------------------------- Decitabine and venetoclax 100mg 21d   ---------------Bone Marrow Bx Results:---------------------- Bone Marrow Biopsy, Clot and Bone Marrow Aspirate, Right PIC -MDS/AML with mutated TP53 ( per International Consensus  Classification of Myeloid Neoplasms and Acute Leukemias) -MDS with biallelic TP53 inactivation (WHO-[id number] ed). Hypercellular marrow for age with multilineage dysplasia, and increased blasts/immature cells (12% on aspirate differential count, 10-15% by CD34 IHC stain). -Abnormal Karyotype 46-47,XY,del(7)(q11.2),+8,del(8)(q13q22)x2,del(9)(q13q22),-11,-12, add(17)(p11.2),del(20)(q11.2q13.3),+1-2mar     {cp13             }/46,XY     {7             } -FISH studies detected 7q deletion (60%), Trisomy 8 (62.5%), 20q deletion (59.5%) and TP53 deletion (55.5%). -[facility] Advanced NGS Myeloid Report detected (Tier I) TP53 p.Cfs529Uen (VAF38%) and U2AF1 p.Mbf036Zst (VAF32%). There are increased blasts/immature cells (12% on aspirate differential count and approximately 10-15% of the cellularity by CD34 immunohistochemical stain).  8/15/24:  Bone marrow biopsy and bone marrow aspirate  - Cellular marrow (30-40%) with markedly decreased myelopoiesis, decreased left shifted erythropoiesis and relatively normal megakaryopoiesis with no increase in blast population  - Persistent 7q deletion (1%), trisomy 8 (2.5%), 20q deletion (2.5%) and TP53 deletion (0.5%) by FISH studies, below the cut-off values  _ Persistent mutations in TP53 p.Owd134Nvq and U2AF1 p.Oxw418Srp at low VAF levels   10/15/24 (Pretransplant BmBx) ----Morphology: Normocellular bone marrow with trilineage regeneration (focal myeloid predominant, focal erythroid predominant, overall decreased megakaryocytes with focally normal numbers and normal morphology, and increased iron stores (history of MDS/AML with complex karyotype and TP53 deletion, under treatment)      - No morphologic or immunophenotypic findings of bone marrow involvement by leukemia are identified ----Karyotype: Normal ----FISH: deletion of TP53 below cut off value  ----NGS: Normal    - Day 30 post transplant: completed 12/12/24  ----Morphology:SANJEEV ----Karyotype: //46,XX[20] ----FISH: Normal FISH ----[facility] is normal. No mutations identified.    - Day 100 post transplant: Due 2/20/25 ----Morphology: ----Karyotype ----FISH ----NGS:   - Day 180 post transplant: Due 5/12/25 ----Morphology: ----Karyotype ----FISH ----NGS:   ---------Past Surgical Hx---------- Partial mastoidectomy and requires periodic left mastoid debridement  -------- -Social Hx ----------------- The patient is  and has 2 daughters, 30 and 27y/o local in [place]  Patient has one brother (69)  Work: Nephrologist  Born in: [place]  Never a smoker/Social alcohol use  ----------Past Family Hx:------------ Noncontributory  [de-identified] : 12/24/24: Day + 42 post transplant. Overall, well and offers no acute concerns. Denies fever, vomiting, diarrhea, rash, mouth sores, nausea or any signs of active bleeding. Denies SOB or B/L LE edema. Taking all medications appropriately.

## 2024-12-31 NOTE — ASSESSMENT
[FreeTextEntry1] : 70 yo with pmhx of AML, s/p HSCT transplant on 11/12/24. 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 ----------Post transplant maintenance therapy: N/A ------------------Indication: N/A ----------DLI given: N/A Disease monitoring: --> Post transplant bone marrow biopsy on Day +30 (completed 12/12/24), Day+ 100 (2/20/25), and Day +180 (5/12/25).   Engraftment: ------Chimerism: 11/26/24 chimerism is greater than 97% chimerism, recipient not detected. ------ANC engraftment date: 11/26/24 ------Platelets engraftment date: Likely 11/29/24, last received platelets on 11/22/24.  ------ABO Rh incompatibility issues:  -->Plan: ------G-CSF support: no ------Transfusions requirements: No ----------------Transfuse if platelets <10K or actively bleeding per SOP. ------Chimerism: monitor every other week and on marrow until d+180, last performed 12/24/24. ------Immune reconstitution due on days +100, + 180, + 365   GvHD: -------Acute: No -------Chronic: N/A -------Steroid Taper: N/A -------ECP: N/A -->Plan: ------Reviewed GvHD signs and symptoms to report ------Prophylaxis with ABC regimen. -----------PTcy -----------Abatacept IV 10mg/kg on days +28 (12/10)    ID: ---Monitoring: Send CMV PCR, Adenovirus and EBV weekly until day +180 ----------CMV: no reactivation noted as of 12/24/24 ----------EBV: no reactivation noted as of 12/24/24 ----------Adeno: no reactivation noted as of 12/24/24 --->Continue ppx: ---------PCP: bactrim ppx ---------HSV and VZV: acyclovir 800mg PO BID  ---------Fungal: posaconazole 300mg PO daily  ---------CMV: letermovir 480 mg PO daily ---------SOS prophylaxis: Ursodiol 300 mg BID (until day 30-60)  ---------If chronic GvHD present, encapsulated organism coverage: N/A  Survivorship: - 25-hydroxyclaciferol (day +80): - TSH (annually): - Ferritin (annually): - Fertility (annual, x2 if applicable): - Bone mineral densitometry (day +365 for women, men exposed to prolonged steroids and CNI): - Vaccinations per SOP starting at day 180+ except for Flu at day + 90 and COVID per CDC guidelines.   Other: Nausea Continue Zofran PRN; Encouraged to increase PO intake as tolerated  Hypokalemia Continue potassium supplementation - 20 mEq daily  HTN Pt has held losartan due to decreased BPs at home; discuss with team to evaluate need for medication over coming weeks   Continue post transplant diet and crowd restrictions. Questions and concerns addressed. Reviewed signs and symptoms to report to clinic; patient has clinic and after hours number  RTC 12/31 labs, provider visit   Linette Houston NP [facility] Adult Transplantation and Cellular Therapy Program   Status post allogeneic transplant, day +42 Diagnosis: AML, complex cytogenetics and P53 mutation Disease staging at transplant: CR Conditioning regimen: Flu Bu (2) rATG GvHD prophylaxis: ABC Donor type: MUD  Stem cell source: Peripheral blodd ABO:  Recipient: A positive  Donor: O positive  Incompatibility: Minor CMV Status  Recipient: Positive  Donor: Negative Toxoplasmosis Status  Recipient: Negative  Donor: Negative   I saw and examined the patient myself.  The patient is doing well and has no complaints.  He has engrafted with full donor chimerism.  He has no aGvHD. He is off IS.  He has no ID issues. His viral studies are negative and is on appropriate prophylaxis.  His in in continuous CR. His BM studies are entirely negative including molecular studies. The patient will continue with the current care and return to clinic next week. I spent a total of 35 minutes performing the above tasks.  DONNA Salinas

## 2024-12-31 NOTE — ASSESSMENT
[FreeTextEntry1] : 72 yo with pmhx of AML, s/p HSCT transplant on 11/12/24. 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 ----------Post transplant maintenance therapy: N/A ------------------Indication: N/A ----------DLI given: N/A Disease monitoring: --> Post transplant bone marrow biopsy on Day +30 (completed 12/12/24), Day+ 100 (2/20/25), and Day +180 (5/12/25).   Engraftment: ------Chimerism: 11/26/24 chimerism is greater than 97% chimerism, recipient not detected. ------ANC engraftment date: 11/26/24 ------Platelets engraftment date: Likely 11/29/24, last received platelets on 11/22/24.  ------ABO Rh incompatibility issues:  -->Plan: ------G-CSF support: no ------Transfusions requirements: No ----------------Transfuse if platelets <10K or actively bleeding per SOP. ------Chimerism: monitor every other week and on marrow until d+180, last performed 12/24/24. ------Immune reconstitution due on days +100, + 180, + 365   GvHD: -------Acute: No -------Chronic: N/A -------Steroid Taper: N/A -------ECP: N/A -->Plan: ------Reviewed GvHD signs and symptoms to report ------Prophylaxis with ABC regimen. -----------PTcy -----------Abatacept IV 10mg/kg on days +28 (12/10)    ID: ---Monitoring: Send CMV PCR, Adenovirus and EBV weekly until day +180 ----------CMV: no reactivation noted as of 12/24/24 ----------EBV: no reactivation noted as of 12/24/24 ----------Adeno: no reactivation noted as of 12/24/24 --->Continue ppx: ---------PCP: bactrim ppx ---------HSV and VZV: acyclovir 800mg PO BID  ---------Fungal: posaconazole 300mg PO daily  ---------CMV: letermovir 480 mg PO daily ---------SOS prophylaxis: Ursodiol 300 mg BID (until day 30-60)  ---------If chronic GvHD present, encapsulated organism coverage: N/A  Survivorship: - 25-hydroxyclaciferol (day +80): - TSH (annually): - Ferritin (annually): - Fertility (annual, x2 if applicable): - Bone mineral densitometry (day +365 for women, men exposed to prolonged steroids and CNI): - Vaccinations per SOP starting at day 180+ except for Flu at day + 90 and COVID per CDC guidelines.   Other: Nausea Continue Zofran PRN; Encouraged to increase PO intake as tolerated  Hypokalemia Restart potassium supplementation - 20 mEq daily  hypotension  increase fluid intake, maintain 2-3L/day  Continue post transplant diet and crowd restrictions. Questions and concerns addressed. Reviewed signs and symptoms to report to clinic; patient has clinic and after hours number  RTC 01/07 labs, provider visit   Medina Walter NP [facility] Adult Transplantation and Cellular Therapy Program   Status post allogeneic transplant, day +49. Diagnosis: AML, complex cytogenetics and P53 mutation Disease staging at transplant: CR Conditioning regimen: Flu Bu (2) rATG GvHD prophylaxis: ABC Donor type: MUD  Stem cell source: Peripheral blood ABO:  Recipient: A positive  Donor: O positive  Incompatibility: Minor CMV Status  Recipient: Positive  Donor: Negative Toxoplasmosis Status  Recipient: Negative  Donor: Negative   I saw and examined the patient myself.  The patient is doing well and has no complaints.  He has engrafted with full donor chimerism.  He has no aGvHD. He is off IS.  He has no ID issues. His viral studies are negative and is on appropriate prophylaxis.  His in in continuous CR. His BM studies are entirely negative including molecular studies. The patient asked about the history of severe allergies in the donor and whether he can eat seafood. We discussed the available data which is limited and often driven by information in the setting of solid organ transplants. We discussed the fact that he is immunosuppressed and the role of testing down the road when he has immune reconstitution. The patient agreed to refrain at this point from getting exposed to theses allergens.  The patient will continue with the current care and return to clinic next week. I spent a total of 30 minutes performing the above tasks.  MARISABEL Mena MD

## 2024-12-31 NOTE — HISTORY OF PRESENT ILLNESS
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 Bay  Referring physician:  Diagnosis: AML  Disease staging at transplant: CR Conditioning regimen: LIV BuFluATG GvHD prophylaxis: PTCyBorAba (ABC) on study  Donor type: MUD                    Mismatches: N/A, 10/10                    Stem cell source: Peripheral  ABO         Recipient: A positive         Donor: O positive          Incompatibility: Minor  CMV Status  Recipient: Positive  Donor: Negative Toxoplasmosis Status  Recipient: Negative   Donor: Negative    PMH:  BPH, AML     -----------------Oncologic Hx:-------------------------------- He had been well, exercising regularly, three miles/day. By early 7/2024, he had developed NGUYEN on climbing stairs. Pancytopenia with circulating blasts were detected and he was admitted to [facility] 7/18/2024. 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 He was treated with decitabine and venetoclax x 3 cycles.    -----------Transplant hospital course:---------------------  Tolerated well, c/b increased BP due to post transplant cytoxan fluids. Additionally c/b hematuria, interstitial mucositis.  -----Day 0 = 11/12/24  --------------Post Transplant Course:-----------------------   -------------Oncological treatments:-------------------------- Decitabine and venetoclax 100mg 21d   ---------------Bone Marrow Bx Results:---------------------- Bone Marrow Biopsy, Clot and Bone Marrow Aspirate, Right PIC -MDS/AML with mutated TP53 ( per International Consensus  Classification of Myeloid Neoplasms and Acute Leukemias) -MDS with biallelic TP53 inactivation (WHO-AYVH8fc ed). Hypercellular marrow for age with multilineage dysplasia, and increased blasts/immature cells (12% on aspirate differential count, 10-15% by CD34 IHC stain). -Abnormal Karyotype 46-47,XY,del(7)(q11.2),+8,del(8)(q13q22)x2,del(9)(q13q22),-11,-12, add(17)(p11.2),del(20)(q11.2q13.3),+1-2mar     {cp13             }/46,XY     {7             } -FISH studies detected 7q deletion (60%), Trisomy 8 (62.5%), 20q deletion (59.5%) and TP53 deletion (55.5%). -[employer] Advanced NGS Myeloid Report detected (Tier I) TP53 p.Kyh157Xbx (VAF38%) and U2AF1 p.Aes419Kpa (VAF32%). There are increased blasts/immature cells (12% on aspirate differential count and approximately 10-15% of the cellularity by CD34 immunohistochemical stain).  8/15/24:  Bone marrow biopsy and bone marrow aspirate  - Cellular marrow (30-40%) with markedly decreased myelopoiesis, decreased left shifted erythropoiesis and relatively normal megakaryopoiesis with no increase in blast population  - Persistent 7q deletion (1%), trisomy 8 (2.5%), 20q deletion (2.5%) and TP53 deletion (0.5%) by FISH studies, below the cut-off values  _ Persistent mutations in TP53 p.Sue805Fdd and U2AF1 p.Ayo119Bum at low VAF levels   10/15/24 (Pretransplant BmBx) ----Morphology: Normocellular bone marrow with trilineage regeneration (focal myeloid predominant, focal erythroid predominant, overall decreased megakaryocytes with focally normal numbers and normal morphology, and increased iron stores (history of MDS/AML with complex karyotype and TP53 deletion, under treatment)      - No morphologic or immunophenotypic findings of bone marrow involvement by leukemia are identified ----Karyotype: Normal ----FISH: deletion of TP53 below cut off value  ----NGS: Normal    - Day 30 post transplant: completed 12/12/24  ----Morphology:SANJEEV ----Karyotype: //46,XX[20] ----FISH: Normal FISH ----[facility] is normal. No mutations identified.    - Day 100 post transplant: Due 2/20/25 ----Morphology: ----Karyotype ----FISH ----NGS:   - Day 180 post transplant: Due 5/12/25 ----Morphology: ----Karyotype ----FISH ----NGS:   ---------Past Surgical Hx---------- Partial mastoidectomy and requires periodic left mastoid debridement  -------- -Social Hx ----------------- The patient is  and has 2 daughters, 30 and 27y/o local in [place]  Patient has one brother (69)  Work: Nephrologist  Born in: [place]  Never a smoker/Social alcohol use  ----------Past Family Hx:------------ Noncontributory  [de-identified] : 12/24/24: Day + 42 post transplant. Overall, well and offers no acute concerns. Denies fever, vomiting, diarrhea, rash, mouth sores, nausea or any signs of active bleeding. Denies SOB or B/L LE edema. Taking all medications appropriately.

## 2024-12-31 NOTE — HISTORY OF PRESENT ILLNESS
[de-identified] : 71 year old s/p HSCT for AML    Status post allogeneic transplant, day +49 Transplant physician: Dr. Bay  Referring physician:  Diagnosis: AML  Disease staging at transplant: CR Conditioning regimen: LIV BuFluATG GvHD prophylaxis: PTCyBorAba (ABC) on study  Donor type: MUD                    Mismatches: N/A, 10/10                    Stem cell source: Peripheral  ABO         Recipient: A positive         Donor: O positive          Incompatibility: Minor  CMV Status  Recipient: Positive  Donor: Negative Toxoplasmosis Status  Recipient: Negative   Donor: Negative    PMH:  BPH, AML     -----------------Oncologic Hx:-------------------------------- He had been well, exercising regularly, three miles/day. By early 7/2024, he had developed NGUYEN on climbing stairs. Pancytopenia with circulating blasts were detected and he was admitted to [facility] 7/18/2024. 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 He was treated with decitabine and venetoclax x 3 cycles.    -----------Transplant hospital course:---------------------  Tolerated well, c/b increased BP due to post transplant cytoxan fluids. Additionally c/b hematuria, interstitial mucositis.  -----Day 0 = 11/12/24  --------------Post Transplant Course:-----------------------   -------------Oncological treatments:-------------------------- Decitabine and venetoclax 100mg 21d   ---------------Bone Marrow Bx Results:---------------------- Bone Marrow Biopsy, Clot and Bone Marrow Aspirate, Right PIC -MDS/AML with mutated TP53 ( per International Consensus  Classification of Myeloid Neoplasms and Acute Leukemias) -MDS with biallelic TP53 inactivation (WHO-OEYR4uc ed). Hypercellular marrow for age with multilineage dysplasia, and increased blasts/immature cells (12% on aspirate differential count, 10-15% by CD34 IHC stain). -Abnormal Karyotype 46-47,XY,del(7)(q11.2),+8,del(8)(q13q22)x2,del(9)(q13q22),-11,-12, add(17)(p11.2),del(20)(q11.2q13.3),+1-2mar     {cp13             \\}/46,XY     {7             \\} -FISH studies detected 7q deletion (60%), Trisomy 8 (62.5%), 20q deletion (59.5%) and TP53 deletion (55.5%). -[employer] Advanced NGS Myeloid Report detected (Tier I) TP53 p.Qsv698Lvz (VAF38%) and U2AF1 p.Vhw967Lou (VAF32%). There are increased blasts/immature cells (12% on aspirate differential count and approximately 10-15% of the cellularity by CD34 immunohistochemical stain).  8/15/24:  Bone marrow biopsy and bone marrow aspirate  - Cellular marrow (30-40%) with markedly decreased myelopoiesis, decreased left shifted erythropoiesis and relatively normal megakaryopoiesis with no increase in blast population  - Persistent 7q deletion (1%), trisomy 8 (2.5%), 20q deletion (2.5%) and TP53 deletion (0.5%) by FISH studies, below the cut-off values  _ Persistent mutations in TP53 p.Cxq452Szq and U2AF1 p.Vyh155Kvv at low VAF levels   10/15/24 (Pretransplant BmBx) ----Morphology: Normocellular bone marrow with trilineage regeneration (focal myeloid predominant, focal erythroid predominant, overall decreased megakaryocytes with focally normal numbers and normal morphology, and increased iron stores (history of MDS/AML with complex karyotype and TP53 deletion, under treatment)      - No morphologic or immunophenotypic findings of bone marrow involvement by leukemia are identified ----Karyotype: Normal ----FISH: deletion of TP53 below cut off value  ----NGS: Normal    - Day 30 post transplant: completed 12/12/24  ----Morphology:SANJEEV ----Karyotype: //46,XX[20] ----FISH: Normal FISH ----[facility] is normal. No mutations identified.    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 Patient reports he had an upper respiratory infection without fever/chills, denies sinus pressure, productive cough with clear sputum took mucinex dm which helped to clear it. Labs notable for K+ of 3.1; will restart po. Appetite is still low. Overall, well and offers no acute concerns. Denies fever, vomiting, diarrhea, rash, mouth sores, nausea or any signs of active bleeding. Denies SOB or B/L LE edema. Taking all medications appropriately.

## 2024-12-31 NOTE — HISTORY OF PRESENT ILLNESS
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 Bay  Referring physician:  Diagnosis: AML  Disease staging at transplant: CR Conditioning regimen: LIV BuFluATG GvHD prophylaxis: PTCyBorAba (ABC) on study  Donor type: MUD                    Mismatches: N/A, 10/10                    Stem cell source: Peripheral  ABO         Recipient: A positive         Donor: O positive          Incompatibility: Minor  CMV Status  Recipient: Positive  Donor: Negative Toxoplasmosis Status  Recipient: Negative   Donor: Negative    PMH:  BPH, AML     -----------------Oncologic Hx:-------------------------------- He had been well, exercising regularly, three miles/day. By early 7/2024, he had developed NGUYEN on climbing stairs. Pancytopenia with circulating blasts were detected and he was admitted to [facility] 7/18/2024. 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 He was treated with decitabine and venetoclax x 3 cycles.    -----------Transplant hospital course:---------------------  Tolerated well, c/b increased BP due to post transplant cytoxan fluids. Additionally c/b hematuria, interstitial mucositis.  -----Day 0 = 11/12/24  --------------Post Transplant Course:-----------------------   -------------Oncological treatments:-------------------------- Decitabine and venetoclax 100mg 21d   ---------------Bone Marrow Bx Results:---------------------- Bone Marrow Biopsy, Clot and Bone Marrow Aspirate, Right PIC -MDS/AML with mutated TP53 ( per International Consensus  Classification of Myeloid Neoplasms and Acute Leukemias) -MDS with biallelic TP53 inactivation (WHO-URHN6ww ed). Hypercellular marrow for age with multilineage dysplasia, and increased blasts/immature cells (12% on aspirate differential count, 10-15% by CD34 IHC stain). -Abnormal Karyotype 46-47,XY,del(7)(q11.2),+8,del(8)(q13q22)x2,del(9)(q13q22),-11,-12, add(17)(p11.2),del(20)(q11.2q13.3),+1-2mar     {cp13             }/46,XY     {7             } -FISH studies detected 7q deletion (60%), Trisomy 8 (62.5%), 20q deletion (59.5%) and TP53 deletion (55.5%). -[employer] Advanced NGS Myeloid Report detected (Tier I) TP53 p.Tcg311Zqy (VAF38%) and U2AF1 p.Cbj212Str (VAF32%). There are increased blasts/immature cells (12% on aspirate differential count and approximately 10-15% of the cellularity by CD34 immunohistochemical stain).  8/15/24:  Bone marrow biopsy and bone marrow aspirate  - Cellular marrow (30-40%) with markedly decreased myelopoiesis, decreased left shifted erythropoiesis and relatively normal megakaryopoiesis with no increase in blast population  - Persistent 7q deletion (1%), trisomy 8 (2.5%), 20q deletion (2.5%) and TP53 deletion (0.5%) by FISH studies, below the cut-off values  _ Persistent mutations in TP53 p.Qvr392Mzl and U2AF1 p.Hjo636Agp at low VAF levels   10/15/24 (Pretransplant BmBx) ----Morphology: Normocellular bone marrow with trilineage regeneration (focal myeloid predominant, focal erythroid predominant, overall decreased megakaryocytes with focally normal numbers and normal morphology, and increased iron stores (history of MDS/AML with complex karyotype and TP53 deletion, under treatment)      - No morphologic or immunophenotypic findings of bone marrow involvement by leukemia are identified ----Karyotype: Normal ----FISH: deletion of TP53 below cut off value  ----NGS: Normal    - Day 30 post transplant: completed 12/12/24  ----Morphology:SANJEEV ----Karyotype: //46,XX[20] ----FISH: Normal FISH ----[facility] is normal. No mutations identified.    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 Overall, well and offers no acute concerns. Denies fever, vomiting, diarrhea, rash, mouth sores, nausea or any signs of active bleeding. Denies SOB or B/L LE edema. Taking all medications appropriately.

## 2024-12-31 NOTE — ASSESSMENT
[FreeTextEntry1] : 72 yo with pmhx of AML, s/p HSCT transplant on 11/12/24. 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 ----------Post transplant maintenance therapy: N/A ------------------Indication: N/A ----------DLI given: N/A Disease monitoring: --> Post transplant bone marrow biopsy on Day +30 (completed 12/12/24), Day+ 100 (2/20/25), and Day +180 (5/12/25).   Engraftment: ------Chimerism: 11/26/24 chimerism is greater than 97% chimerism, recipient not detected. ------ANC engraftment date: 11/26/24 ------Platelets engraftment date: Likely 11/29/24, last received platelets on 11/22/24.  ------ABO Rh incompatibility issues:  -->Plan: ------G-CSF support: no ------Transfusions requirements: No ----------------Transfuse if platelets <10K or actively bleeding per SOP. ------Chimerism: monitor every other week and on marrow until d+180, last performed 12/24/24. ------Immune reconstitution due on days +100, + 180, + 365   GvHD: -------Acute: No -------Chronic: N/A -------Steroid Taper: N/A -------ECP: N/A -->Plan: ------Reviewed GvHD signs and symptoms to report ------Prophylaxis with ABC regimen. -----------PTcy -----------Abatacept IV 10mg/kg on days +28 (12/10)    ID: ---Monitoring: Send CMV PCR, Adenovirus and EBV weekly until day +180 ----------CMV: no reactivation noted as of 12/24/24 ----------EBV: no reactivation noted as of 12/24/24 ----------Adeno: no reactivation noted as of 12/24/24 --->Continue ppx: ---------PCP: bactrim ppx ---------HSV and VZV: acyclovir 800mg PO BID  ---------Fungal: posaconazole 300mg PO daily  ---------CMV: letermovir 480 mg PO daily ---------SOS prophylaxis: Ursodiol 300 mg BID (until day 30-60)  ---------If chronic GvHD present, encapsulated organism coverage: N/A  Survivorship: - 25-hydroxyclaciferol (day +80): - TSH (annually): - Ferritin (annually): - Fertility (annual, x2 if applicable): - Bone mineral densitometry (day +365 for women, men exposed to prolonged steroids and CNI): - Vaccinations per SOP starting at day 180+ except for Flu at day + 90 and COVID per CDC guidelines.   Other: Nausea Continue Zofran PRN; Encouraged to increase PO intake as tolerated  Hypokalemia Continue potassium supplementation - 20 mEq daily  HTN Pt has held losartan due to decreased BPs at home; discuss with team to evaluate need for medication over coming weeks   Continue post transplant diet and crowd restrictions. Questions and concerns addressed. 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  The patient is doing well and has no complaints.  He has engrafted with full donor chimerism.  He has no aGvHD. He is off IS.  He has no ID issues. His viral studies are negative and is on appropriate prophylaxis.  His in in continuous CR. His BM studies are entirely negative including molecular studies. The patient will continue with the current care and return to clinic next week. I spent a total of 35 minutes performing the above tasks.  DONNA Salinas

## 2024-12-31 NOTE — ASSESSMENT
[FreeTextEntry1] : 72 yo with pmhx of AML, s/p HSCT transplant on 11/12/24. 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 ----------Post transplant maintenance therapy: N/A ------------------Indication: N/A ----------DLI given: N/A Disease monitoring: --> Post transplant bone marrow biopsy on Day +30 (completed 12/12/24), Day+ 100 (2/20/25), and Day +180 (5/12/25).   Engraftment: ------Chimerism: 11/26/24 chimerism is greater than 97% chimerism, recipient not detected. ------ANC engraftment date: 11/26/24 ------Platelets engraftment date: Likely 11/29/24, last received platelets on 11/22/24.  ------ABO Rh incompatibility issues:  -->Plan: ------G-CSF support: no ------Transfusions requirements: No ----------------Transfuse if platelets <10K or actively bleeding per SOP. ------Chimerism: monitor every other week and on marrow until d+180, last performed 12/24/24. ------Immune reconstitution due on days +100, + 180, + 365   GvHD: -------Acute: No -------Chronic: N/A -------Steroid Taper: N/A -------ECP: N/A -->Plan: ------Reviewed GvHD signs and symptoms to report ------Prophylaxis with ABC regimen. -----------PTcy -----------Abatacept IV 10mg/kg on days +28 (12/10)    ID: ---Monitoring: Send CMV PCR, Adenovirus and EBV weekly until day +180 ----------CMV: no reactivation noted as of 12/24/24 ----------EBV: no reactivation noted as of 12/24/24 ----------Adeno: no reactivation noted as of 12/24/24 --->Continue ppx: ---------PCP: bactrim ppx ---------HSV and VZV: acyclovir 800mg PO BID  ---------Fungal: posaconazole 300mg PO daily  ---------CMV: letermovir 480 mg PO daily ---------SOS prophylaxis: Ursodiol 300 mg BID (until day 30-60)  ---------If chronic GvHD present, encapsulated organism coverage: N/A  Survivorship: - 25-hydroxyclaciferol (day +80): - TSH (annually): - Ferritin (annually): - Fertility (annual, x2 if applicable): - Bone mineral densitometry (day +365 for women, men exposed to prolonged steroids and CNI): - Vaccinations per SOP starting at day 180+ except for Flu at day + 90 and COVID per CDC guidelines.   Other: Nausea Continue Zofran PRN; Encouraged to increase PO intake as tolerated  Hypokalemia Continue potassium supplementation - 20 mEq daily  HTN Pt has held losartan due to decreased BPs at home; discuss with team to evaluate need for medication over coming weeks   Continue post transplant diet and crowd restrictions. Questions and concerns addressed. 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  The patient is doing well and has no complaints.  He has engrafted with full donor chimerism.  He has no aGvHD. He is off IS.  He has no ID issues. His viral studies are negative and is on appropriate prophylaxis.  His in in continuous CR. His BM studies are entirely negative including molecular studies. The patient will continue with the current care and return to clinic next week. I spent a total of 35 minutes performing the above tasks.  DONNA Salinas

## 2025-01-08 NOTE — REVIEW OF SYSTEMS
[Fatigue] : fatigue [Fever] : no fever [Chills] : no chills [Night Sweats] : no night sweats [Vision Problems] : no vision problems [Nosebleeds] : no nosebleeds [Shortness Of Breath] : no shortness of breath [SOB on Exertion] : no shortness of breath during exertion [Vomiting] : no vomiting [Constipation] : no constipation [Dysuria] : no dysuria [Negative] : Heme/Lymph

## 2025-01-08 NOTE — ASSESSMENT
[FreeTextEntry1] : 70 yo with pmhx of AML, s/p HSCT transplant on 11/12/24. 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 ----------Post transplant maintenance therapy: N/A ------------------Indication: N/A ----------DLI given: N/A Disease monitoring: --> Post transplant bone marrow biopsy on Day +30 (completed 12/12/24), Day+ 100 (2/20/25), and Day +180 (5/12/25).   Engraftment: ------Chimerism: 11/26/24 chimerism is greater than 97% chimerism, recipient not detected. ------ANC engraftment date: 11/26/24 ------Platelets engraftment date: 11/29/24, last received platelets on 11/22/24.  ------ABO Rh incompatibility issues:  -->Plan: ------G-CSF support: no ------Transfusions requirements: No ----------------Transfuse if platelets <10K or actively bleeding per SOP. ------Chimerism: monitor every other week and on marrow until d+180, last performed 12/24/24. ------Immune reconstitution due on days +100, + 180, + 365   GvHD: -------Acute: No -------Chronic: N/A -------Steroid Taper: N/A -------ECP: N/A -->Plan: ------Reviewed GvHD signs and symptoms to report ------Prophylaxis with ABC regimen. -----------PTcy -----------Abatacept IV 10mg/kg on days +28 (12/10)    ID: ---Monitoring: Send CMV PCR, Adenovirus and EBV weekly until day +180 ----------CMV: no reactivation noted as of 12/31/24 ----------EBV: no reactivation noted as of 12/31/24 ----------Adeno: no reactivation noted as of 12/31/24 --->Continue ppx: ---------PCP: bactrim ppx ---------HSV and VZV: acyclovir 800mg PO BID  ---------Fungal: posaconazole 300mg PO daily  ---------CMV: letermovir 480 mg PO daily ---------SOS prophylaxis: Ursodiol 300 mg BID (until day 30-60)  ---------If chronic GvHD present, encapsulated organism coverage: N/A  Survivorship: - 25-hydroxyclaciferol (day +80): - TSH (annually): - Ferritin (annually): - Fertility (annual, x2 if applicable): - Bone mineral densitometry (day +365 for women, men exposed to prolonged steroids and CNI): - Vaccinations per SOP starting at day 180+ except for Flu at day + 90 and COVID per CDC guidelines.   Other: Nausea Continue Zofran PRN; Encouraged to increase PO intake as tolerated  Hypokalemia Restart potassium supplementation - 20 mEq daily  hypotension (improving) increase fluid intake, maintain 2-3L/day  Continue post transplant diet and crowd restrictions. Questions and concerns addressed. Reviewed signs and symptoms to report to clinic; patient has clinic and after hours number  RTC 01/14 labs, provider visit   Medina Walter NP [facility] Adult Transplantation and Cellular Therapy Program

## 2025-01-08 NOTE — HISTORY OF PRESENT ILLNESS
[de-identified] : 71 year old s/p HSCT for AML    Status post allogeneic transplant, day +56 Transplant physician: Dr. Bay  Referring physician:  Diagnosis: AML  Disease staging at transplant: CR Conditioning regimen: LIV BuFluATG GvHD prophylaxis: PTCyBorAba (ABC) on study  Donor type: MUD                    Mismatches: N/A, 10/10                    Stem cell source: Peripheral  ABO         Recipient: A positive         Donor: O positive          Incompatibility: Minor  CMV Status  Recipient: Positive  Donor: Negative Toxoplasmosis Status  Recipient: Negative   Donor: Negative    PMH:  BPH, AML     -----------------Oncologic Hx:-------------------------------- He had been well, exercising regularly, three miles/day. By early 7/2024, he had developed NGUYEN on climbing stairs. Pancytopenia with circulating blasts were detected and he was admitted to [facility] 7/18/2024. 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 He was treated with decitabine and venetoclax x 3 cycles.    -----------Transplant hospital course:---------------------  Tolerated well, c/b increased BP due to post transplant cytoxan fluids. Additionally c/b hematuria, interstitial mucositis.  -----Day 0 = 11/12/24  --------------Post Transplant Course:-----------------------   -------------Oncological treatments:-------------------------- Decitabine and venetoclax 100mg 21d   ---------------Bone Marrow Bx Results:---------------------- Bone Marrow Biopsy, Clot and Bone Marrow Aspirate, Right PIC -MDS/AML with mutated TP53 ( per International Consensus  Classification of Myeloid Neoplasms and Acute Leukemias) -MDS with biallelic TP53 inactivation (WHO-BMDG6ty ed). Hypercellular marrow for age with multilineage dysplasia, and increased blasts/immature cells (12% on aspirate differential count, 10-15% by CD34 IHC stain). -Abnormal Karyotype 46-47,XY,del(7)(q11.2),+8,del(8)(q13q22)x2,del(9)(q13q22),-11,-12, add(17)(p11.2),del(20)(q11.2q13.3),+1-2mar     {cp13             \\\}/46,XY     {7             \\\} -FISH studies detected 7q deletion (60%), Trisomy 8 (62.5%), 20q deletion (59.5%) and TP53 deletion (55.5%). -Onko[employer] Advanced NGS Myeloid Report detected (Tier I) TP53 p.Hay420Vix (VAF38%) and U2AF1 p.Irb626Tqr (VAF32%). There are increased blasts/immature cells (12% on aspirate differential count and approximately 10-15% of the cellularity by CD34 immunohistochemical stain).  8/15/24:  Bone marrow biopsy and bone marrow aspirate  - Cellular marrow (30-40%) with markedly decreased myelopoiesis, decreased left shifted erythropoiesis and relatively normal megakaryopoiesis with no increase in blast population  - Persistent 7q deletion (1%), trisomy 8 (2.5%), 20q deletion (2.5%) and TP53 deletion (0.5%) by FISH studies, below the cut-off values  _ Persistent mutations in TP53 p.Onn207Jcz and U2AF1 p.Lzr322Vox at low VAF levels   10/15/24 (Pretransplant BmBx) ----Morphology: Normocellular bone marrow with trilineage regeneration (focal myeloid predominant, focal erythroid predominant, overall decreased megakaryocytes with focally normal numbers and normal morphology, and increased iron stores (history of MDS/AML with complex karyotype and TP53 deletion, under treatment)      - No morphologic or immunophenotypic findings of bone marrow involvement by leukemia are identified ----Karyotype: Normal ----FISH: deletion of TP53 below cut off value  ----NGS: Normal    - Day 30 post transplant: completed 12/12/24  ----Morphology:SANJEEV ----Karyotype: //46,XX[20] ----FISH: Normal FISH ----[facility] is normal. No mutations identified.    - Day 100 post transplant: Due 2/20/25 ----Morphology: ----Karyotype ----FISH ----NGS:   - Day 180 post transplant: Due 5/12/25 ----Morphology: ----Karyotype ----FISH ----NGS:   ---------Past Surgical Hx---------- Partial mastoidectomy and requires periodic left mastoid debridement  -------- -Social Hx ----------------- The patient is  and has 2 daughters, 30 and 29y/o local in [place]  Patient has one brother (69)  Work: Nephrologist  Born in: [place]  Never a smoker/Social alcohol use  ----------Past Family Hx:------------ Noncontributory  [de-identified] : 1/7/25: Day + 56 post-transplant. Labs notable for K+ of 3.1; will restart po. Appetite is still low. Overall, well and offers no acute concerns. Denies fever, vomiting, diarrhea, rash, mouth sores, nausea or any signs of active bleeding. Denies SOB or B/L LE edema. Taking all medications appropriately.

## 2025-01-08 NOTE — ASSESSMENT
[FreeTextEntry1] : 72 yo with pmhx of AML, s/p HSCT transplant on 11/12/24. 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 ----------Post transplant maintenance therapy: N/A ------------------Indication: N/A ----------DLI given: N/A Disease monitoring: --> Post transplant bone marrow biopsy on Day +30 (completed 12/12/24), Day+ 100 (2/20/25), and Day +180 (5/12/25).   Engraftment: ------Chimerism: 11/26/24 chimerism is greater than 97% chimerism, recipient not detected. ------ANC engraftment date: 11/26/24 ------Platelets engraftment date: 11/29/24, last received platelets on 11/22/24.  ------ABO Rh incompatibility issues:  -->Plan: ------G-CSF support: no ------Transfusions requirements: No ----------------Transfuse if platelets <10K or actively bleeding per SOP. ------Chimerism: monitor every other week and on marrow until d+180, last performed 12/24/24. ------Immune reconstitution due on days +100, + 180, + 365   GvHD: -------Acute: No -------Chronic: N/A -------Steroid Taper: N/A -------ECP: N/A -->Plan: ------Reviewed GvHD signs and symptoms to report ------Prophylaxis with ABC regimen. -----------PTcy -----------Abatacept IV 10mg/kg on days +28 (12/10)    ID: ---Monitoring: Send CMV PCR, Adenovirus and EBV weekly until day +180 ----------CMV: no reactivation noted as of 12/31/24 ----------EBV: no reactivation noted as of 12/31/24 ----------Adeno: no reactivation noted as of 12/31/24 --->Continue ppx: ---------PCP: bactrim ppx ---------HSV and VZV: acyclovir 800mg PO BID  ---------Fungal: posaconazole 300mg PO daily  ---------CMV: letermovir 480 mg PO daily ---------SOS prophylaxis: Ursodiol 300 mg BID (until day 30-60)  ---------If chronic GvHD present, encapsulated organism coverage: N/A  Survivorship: - 25-hydroxyclaciferol (day +80): - TSH (annually): - Ferritin (annually): - Fertility (annual, x2 if applicable): - Bone mineral densitometry (day +365 for women, men exposed to prolonged steroids and CNI): - Vaccinations per SOP starting at day 180+ except for Flu at day + 90 and COVID per CDC guidelines.   Other: Nausea Continue Zofran PRN; Encouraged to increase PO intake as tolerated  Hypokalemia Restart potassium supplementation - 20 mEq daily  hypotension (improving) increase fluid intake, maintain 2-3L/day  Continue post transplant diet and crowd restrictions. Questions and concerns addressed. Reviewed signs and symptoms to report to clinic; patient has clinic and after hours number  RTC 01/14 labs, provider visit   Medina Walter NP [facility] Adult Transplantation and Cellular Therapy Program

## 2025-01-08 NOTE — HISTORY OF PRESENT ILLNESS
[de-identified] : 71 year old s/p HSCT for AML    Status post allogeneic transplant, day +56 Transplant physician: Dr. Bay  Referring physician:  Diagnosis: AML  Disease staging at transplant: CR Conditioning regimen: LIV BuFluATG GvHD prophylaxis: PTCyBorAba (ABC) on study  Donor type: MUD                    Mismatches: N/A, 10/10                    Stem cell source: Peripheral  ABO         Recipient: A positive         Donor: O positive          Incompatibility: Minor  CMV Status  Recipient: Positive  Donor: Negative Toxoplasmosis Status  Recipient: Negative   Donor: Negative    PMH:  BPH, AML     -----------------Oncologic Hx:-------------------------------- He had been well, exercising regularly, three miles/day. By early 7/2024, he had developed NGUYEN on climbing stairs. Pancytopenia with circulating blasts were detected and he was admitted to [facility] 7/18/2024. 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 He was treated with decitabine and venetoclax x 3 cycles.    -----------Transplant hospital course:---------------------  Tolerated well, c/b increased BP due to post transplant cytoxan fluids. Additionally c/b hematuria, interstitial mucositis.  -----Day 0 = 11/12/24  --------------Post Transplant Course:-----------------------   -------------Oncological treatments:-------------------------- Decitabine and venetoclax 100mg 21d   ---------------Bone Marrow Bx Results:---------------------- Bone Marrow Biopsy, Clot and Bone Marrow Aspirate, Right PIC -MDS/AML with mutated TP53 ( per International Consensus  Classification of Myeloid Neoplasms and Acute Leukemias) -MDS with biallelic TP53 inactivation (WHO-RPDW5pr ed). Hypercellular marrow for age with multilineage dysplasia, and increased blasts/immature cells (12% on aspirate differential count, 10-15% by CD34 IHC stain). -Abnormal Karyotype 46-47,XY,del(7)(q11.2),+8,del(8)(q13q22)x2,del(9)(q13q22),-11,-12, add(17)(p11.2),del(20)(q11.2q13.3),+1-2mar     {cp13             \\\}/46,XY     {7             \\\} -FISH studies detected 7q deletion (60%), Trisomy 8 (62.5%), 20q deletion (59.5%) and TP53 deletion (55.5%). -[employer] Advanced NGS Myeloid Report detected (Tier I) TP53 p.Cky355Tag (VAF38%) and U2AF1 p.Slm024Rgy (VAF32%). There are increased blasts/immature cells (12% on aspirate differential count and approximately 10-15% of the cellularity by CD34 immunohistochemical stain).  8/15/24:  Bone marrow biopsy and bone marrow aspirate  - Cellular marrow (30-40%) with markedly decreased myelopoiesis, decreased left shifted erythropoiesis and relatively normal megakaryopoiesis with no increase in blast population  - Persistent 7q deletion (1%), trisomy 8 (2.5%), 20q deletion (2.5%) and TP53 deletion (0.5%) by FISH studies, below the cut-off values  _ Persistent mutations in TP53 p.Qaw804Urj and U2AF1 p.Qmc206Ihm at low VAF levels   10/15/24 (Pretransplant BmBx) ----Morphology: Normocellular bone marrow with trilineage regeneration (focal myeloid predominant, focal erythroid predominant, overall decreased megakaryocytes with focally normal numbers and normal morphology, and increased iron stores (history of MDS/AML with complex karyotype and TP53 deletion, under treatment)      - No morphologic or immunophenotypic findings of bone marrow involvement by leukemia are identified ----Karyotype: Normal ----FISH: deletion of TP53 below cut off value  ----NGS: Normal    - Day 30 post transplant: completed 12/12/24  ----Morphology:SANJEEV ----Karyotype: //46,XX[20] ----FISH: Normal FISH ----[facility] is normal. No mutations identified.    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 Labs notable for K+ of 3.1; will restart po. Appetite is still low. Overall, well and offers no acute concerns. Denies fever, vomiting, diarrhea, rash, mouth sores, nausea or any signs of active bleeding. Denies SOB or B/L LE edema. Taking all medications appropriately.

## 2025-01-16 NOTE — ASSESSMENT
[FreeTextEntry1] : 72 yo with pmhx of AML, s/p HSCT transplant on 11/12/24. 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 ----------Post transplant maintenance therapy: N/A ------------------Indication: N/A ----------DLI given: N/A Disease monitoring: --> Post transplant bone marrow biopsy on Day +30 (completed 12/12/24), Day+ 100 (2/20/25), and Day +180 (5/12/25).   Engraftment: ------Chimerism: 11/26/24 chimerism is greater than 97% chimerism, recipient not detected. ------ANC engraftment date: 11/26/24 ------Platelets engraftment date: 11/29/24, last received platelets on 11/22/24.  ------ABO Rh incompatibility issues:  -->Plan: ------G-CSF support: no ------Transfusions requirements: No ----------------Transfuse if platelets <10K or actively bleeding per SOP. ------Chimerism: monitor every other week and on marrow until d+180, last performed 12/24/24. ------Immune reconstitution due on days +100, + 180, + 365   GvHD: -------Acute: No -------Chronic: N/A -------Steroid Taper: N/A -------ECP: N/A -->Plan: ------Reviewed GvHD signs and symptoms to report ------Prophylaxis with ABC regimen. -----------PTcy -----------Abatacept IV 10mg/kg on days +28 (12/10)    ID: ---Monitoring: Send CMV PCR, Adenovirus and EBV weekly until day +180 ----------CMV: negative to date. ----------EBV: negative to date. ----------Adeno: negative to date. --->Continue ppx: ---------PCP: bactrim ppx ---------HSV and VZV: acyclovir 800mg PO BID  ---------Fungal: posaconazole 300mg PO daily  ---------CMV: letermovir 480 mg PO daily ---------SOS prophylaxis: Ursodiol 300 mg BID (until day 30-60). Discontinued 1/7/25. ---------If chronic GvHD present, encapsulated organism coverage: N/A  Survivorship: - 25-hydroxyclaciferol (day +80): - TSH (annually): - Ferritin (annually): - Fertility (annual, x2 if applicable): - Bone mineral densitometry (day +365 for women, men exposed to prolonged steroids and CNI): - Vaccinations per SOP starting at day 180+ except for Flu at day + 90 and COVID per CDC guidelines.   Other: Nausea Continue Zofran PRN; Encouraged to increase PO intake as tolerated  Hypokalemia Restart potassium supplementation - 20 mEq daily  hypotension (improving) increase fluid intake, maintain 2-3L/day  Continue post transplant diet and crowd restrictions. Questions and concerns addressed. Reviewed signs and symptoms to report to clinic; patient has clinic and after hours number  RTC 01/21 labs, provider visit. Weekly dressing changes. Plan to remove tunnel catheter.   Linette Houston NP [facility] Adult Transplantation and Cellular Therapy Program   I saw the patient on day +63 following his allogeneic HSCT.  I reviewed the data and the above note.   Diagnosis: AML Disease staging at transplant: CR Conditioning regimen: LIV Bu Flu rATG GvHD prophylaxis: ABC Donor type: MUD  Stem cell source: Peripheral ABO  Recipient: A positive  Donor: O positive  Incompatibility: Minor CMV Status  Recipient: Positive  Donor: Negative Toxoplasmosis Status  Recipient: Negative  Donor: Negative  The patient is doing well with no complaints. He has engrafted with full donor chimerism. He has no aGvHD. He has no infectious complications and is on appropriate prophylaxis.  He has no evidence of disease based on his last BM studies.  He has no other issues besides low K. He will take his supplements.  He understands his current status and precautions. He will return to clinic in 1 week. I spent a total of 30 minutes performing the above tasks. MARISABEL Mena MD

## 2025-01-16 NOTE — ASSESSMENT
[FreeTextEntry1] : 72 yo with pmhx of AML, s/p HSCT transplant on 11/12/24. 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 ----------Post transplant maintenance therapy: N/A ------------------Indication: N/A ----------DLI given: N/A Disease monitoring: --> Post transplant bone marrow biopsy on Day +30 (completed 12/12/24), Day+ 100 (2/20/25), and Day +180 (5/12/25).   Engraftment: ------Chimerism: 11/26/24 chimerism is greater than 97% chimerism, recipient not detected. ------ANC engraftment date: 11/26/24 ------Platelets engraftment date: 11/29/24, last received platelets on 11/22/24.  ------ABO Rh incompatibility issues:  -->Plan: ------G-CSF support: no ------Transfusions requirements: No ----------------Transfuse if platelets <10K or actively bleeding per SOP. ------Chimerism: monitor every other week and on marrow until d+180, last performed 12/24/24. ------Immune reconstitution due on days +100, + 180, + 365   GvHD: -------Acute: No -------Chronic: N/A -------Steroid Taper: N/A -------ECP: N/A -->Plan: ------Reviewed GvHD signs and symptoms to report ------Prophylaxis with ABC regimen. -----------PTcy -----------Abatacept IV 10mg/kg on days +28 (12/10)    ID: ---Monitoring: Send CMV PCR, Adenovirus and EBV weekly until day +180 ----------CMV: negative to date. ----------EBV: negative to date. ----------Adeno: negative to date. --->Continue ppx: ---------PCP: bactrim ppx ---------HSV and VZV: acyclovir 800mg PO BID  ---------Fungal: posaconazole 300mg PO daily  ---------CMV: letermovir 480 mg PO daily ---------SOS prophylaxis: Ursodiol 300 mg BID (until day 30-60). Discontinued 1/7/25. ---------If chronic GvHD present, encapsulated organism coverage: N/A  Survivorship: - 25-hydroxyclaciferol (day +80): - TSH (annually): - Ferritin (annually): - Fertility (annual, x2 if applicable): - Bone mineral densitometry (day +365 for women, men exposed to prolonged steroids and CNI): - Vaccinations per SOP starting at day 180+ except for Flu at day + 90 and COVID per CDC guidelines.   Other: Nausea Continue Zofran PRN; Encouraged to increase PO intake as tolerated  Hypokalemia Restart potassium supplementation - 20 mEq daily  hypotension (improving) increase fluid intake, maintain 2-3L/day  Continue post transplant diet and crowd restrictions. Questions and concerns addressed. Reviewed signs and symptoms to report to clinic; patient has clinic and after hours number  RTC 01/21 labs, provider visit. Weekly dressing changes. Plan to remove tunnel catheter.   Linette Houston NP [facility] Adult Transplantation and Cellular Therapy Program   I saw the patient on day +63 following his allogeneic HSCT.  I reviewed the data and the above note.   Diagnosis: AML Disease staging at transplant: CR Conditioning regimen: LIV Bu Flu rATG GvHD prophylaxis: ABC Donor type: MUD  Stem cell source: Peripheral ABO  Recipient: A positive  Donor: O positive  Incompatibility: Minor CMV Status  Recipient: Positive  Donor: Negative Toxoplasmosis Status  Recipient: Negative  Donor: Negative  The patient is doing well with no complaints. He has engrafted with full donor chimerism. He has no aGvHD. He has no infectious complications and is on appropriate prophylaxis.  He has no evidence of disease based on his last BM studies.  He has no other issues besides low K. He will take his supplements.  He understands his current status and precautions. He will return to clinic in 1 week. I spent a total of 30 minutes performing the above tasks. MARISABEL Mena MD

## 2025-01-16 NOTE — HISTORY OF PRESENT ILLNESS
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 Bay  Referring physician:  Diagnosis: AML  Disease staging at transplant: CR Conditioning regimen: LIV BuFluATG GvHD prophylaxis: PTCyBorAba (ABC) on study  Donor type: MUD                    Mismatches: N/A, 10/10                    Stem cell source: Peripheral  ABO         Recipient: A positive         Donor: O positive          Incompatibility: Minor  CMV Status  Recipient: Positive  Donor: Negative Toxoplasmosis Status  Recipient: Negative   Donor: Negative    PMH:  BPH, AML     -----------------Oncologic Hx:-------------------------------- He had been well, exercising regularly, three miles/day. By early 7/2024, he had developed NGUYEN on climbing stairs. Pancytopenia with circulating blasts were detected and he was admitted to [facility] 7/18/2024. 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 He was treated with decitabine and venetoclax x 3 cycles.    -----------Transplant hospital course:---------------------  Tolerated well, c/b increased BP due to post transplant cytoxan fluids. Additionally c/b hematuria, interstitial mucositis.  -----Day 0 = 11/12/24  --------------Post Transplant Course:-----------------------   -------------Oncological treatments:-------------------------- Decitabine and venetoclax 100mg 21d   ---------------Bone Marrow Bx Results:---------------------- Bone Marrow Biopsy, Clot and Bone Marrow Aspirate, Right PIC -MDS/AML with mutated TP53 ( per International Consensus  Classification of Myeloid Neoplasms and Acute Leukemias) -MDS with biallelic TP53 inactivation (WHO-FMKN3px ed). Hypercellular marrow for age with multilineage dysplasia, and increased blasts/immature cells (12% on aspirate differential count, 10-15% by CD34 IHC stain). -Abnormal Karyotype 46-47,XY,del(7)(q11.2),+8,del(8)(q13q22)x2,del(9)(q13q22),-11,-12, add(17)(p11.2),del(20)(q11.2q13.3),+1-2mar     {cp13             \\\\\}/46,XY     {7             \\\\\} -FISH studies detected 7q deletion (60%), Trisomy 8 (62.5%), 20q deletion (59.5%) and TP53 deletion (55.5%). -[employer] Advanced NGS Myeloid Report detected (Tier I) TP53 p.Tsg237Rmo (VAF38%) and U2AF1 p.Bue597Mfy (VAF32%). There are increased blasts/immature cells (12% on aspirate differential count and approximately 10-15% of the cellularity by CD34 immunohistochemical stain).  8/15/24:  Bone marrow biopsy and bone marrow aspirate  - Cellular marrow (30-40%) with markedly decreased myelopoiesis, decreased left shifted erythropoiesis and relatively normal megakaryopoiesis with no increase in blast population  - Persistent 7q deletion (1%), trisomy 8 (2.5%), 20q deletion (2.5%) and TP53 deletion (0.5%) by FISH studies, below the cut-off values  _ Persistent mutations in TP53 p.Pfk114Zxn and U2AF1 p.Mzl443Rch at low VAF levels   10/15/24 (Pretransplant BmBx) ----Morphology: Normocellular bone marrow with trilineage regeneration (focal myeloid predominant, focal erythroid predominant, overall decreased megakaryocytes with focally normal numbers and normal morphology, and increased iron stores (history of MDS/AML with complex karyotype and TP53 deletion, under treatment)      - No morphologic or immunophenotypic findings of bone marrow involvement by leukemia are identified ----Karyotype: Normal ----FISH: deletion of TP53 below cut off value  ----NGS: Normal    - Day 30 post transplant: completed 12/12/24  ----Morphology:SANJEEV ----Karyotype: //46,XX[20] ----FISH: Normal FISH ----NGS: [facility] Advanced NGS Myeloid Panel is normal. No mutations identified.    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 Labs notable for K+ 3.0, will restart po.  Overall, well and offers no acute concerns. Denies fever, vomiting, diarrhea, rash, mouth sores, nausea or any signs of active bleeding. Denies SOB or B/L LE edema.

## 2025-01-16 NOTE — REVIEW OF SYSTEMS
[Fatigue] : fatigue [Negative] : Heme/Lymph [Fever] : no fever [Chills] : no chills [Night Sweats] : no night sweats [Recent Change In Weight] : ~T no recent weight change [Vision Problems] : no vision problems [Nosebleeds] : no nosebleeds [Shortness Of Breath] : no shortness of breath [SOB on Exertion] : no shortness of breath during exertion [Vomiting] : no vomiting [Constipation] : no constipation [Dysuria] : no dysuria

## 2025-01-16 NOTE — HISTORY OF PRESENT ILLNESS
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 Bay  Referring physician:  Diagnosis: AML  Disease staging at transplant: CR Conditioning regimen: LIV BuFluATG GvHD prophylaxis: PTCyBorAba (ABC) on study  Donor type: MUD                    Mismatches: N/A, 10/10                    Stem cell source: Peripheral  ABO         Recipient: A positive         Donor: O positive          Incompatibility: Minor  CMV Status  Recipient: Positive  Donor: Negative Toxoplasmosis Status  Recipient: Negative   Donor: Negative    PMH:  BPH, AML     -----------------Oncologic Hx:-------------------------------- He had been well, exercising regularly, three miles/day. By early 7/2024, he had developed NGUYEN on climbing stairs. Pancytopenia with circulating blasts were detected and he was admitted to [facility] 7/18/2024. 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 He was treated with decitabine and venetoclax x 3 cycles.    -----------Transplant hospital course:---------------------  Tolerated well, c/b increased BP due to post transplant cytoxan fluids. Additionally c/b hematuria, interstitial mucositis.  -----Day 0 = 11/12/24  --------------Post Transplant Course:-----------------------   -------------Oncological treatments:-------------------------- Decitabine and venetoclax 100mg 21d   ---------------Bone Marrow Bx Results:---------------------- Bone Marrow Biopsy, Clot and Bone Marrow Aspirate, Right PIC -MDS/AML with mutated TP53 ( per International Consensus  Classification of Myeloid Neoplasms and Acute Leukemias) -MDS with biallelic TP53 inactivation (WHO-TYUM7wi ed). Hypercellular marrow for age with multilineage dysplasia, and increased blasts/immature cells (12% on aspirate differential count, 10-15% by CD34 IHC stain). -Abnormal Karyotype 46-47,XY,del(7)(q11.2),+8,del(8)(q13q22)x2,del(9)(q13q22),-11,-12, add(17)(p11.2),del(20)(q11.2q13.3),+1-2mar     {cp13             \\\\\}/46,XY     {7             \\\\\} -FISH studies detected 7q deletion (60%), Trisomy 8 (62.5%), 20q deletion (59.5%) and TP53 deletion (55.5%). -[employer] Advanced NGS Myeloid Report detected (Tier I) TP53 p.Gln357Xjs (VAF38%) and U2AF1 p.Wng608Xmr (VAF32%). There are increased blasts/immature cells (12% on aspirate differential count and approximately 10-15% of the cellularity by CD34 immunohistochemical stain).  8/15/24:  Bone marrow biopsy and bone marrow aspirate  - Cellular marrow (30-40%) with markedly decreased myelopoiesis, decreased left shifted erythropoiesis and relatively normal megakaryopoiesis with no increase in blast population  - Persistent 7q deletion (1%), trisomy 8 (2.5%), 20q deletion (2.5%) and TP53 deletion (0.5%) by FISH studies, below the cut-off values  _ Persistent mutations in TP53 p.Uik539Whh and U2AF1 p.Pdj430Hwy at low VAF levels   10/15/24 (Pretransplant BmBx) ----Morphology: Normocellular bone marrow with trilineage regeneration (focal myeloid predominant, focal erythroid predominant, overall decreased megakaryocytes with focally normal numbers and normal morphology, and increased iron stores (history of MDS/AML with complex karyotype and TP53 deletion, under treatment)      - No morphologic or immunophenotypic findings of bone marrow involvement by leukemia are identified ----Karyotype: Normal ----FISH: deletion of TP53 below cut off value  ----NGS: Normal    - Day 30 post transplant: completed 12/12/24  ----Morphology:SANJEEV ----Karyotype: //46,XX[20] ----FISH: Normal FISH ----NGS: [facility] Advanced NGS Myeloid Panel is normal. No mutations identified.    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 Labs notable for K+ 3.0, will restart po.  Overall, well and offers no acute concerns. Denies fever, vomiting, diarrhea, rash, mouth sores, nausea or any signs of active bleeding. Denies SOB or B/L LE edema.

## 2025-01-21 NOTE — ASSESSMENT
[FreeTextEntry1] : 72 yo with pmhx of AML, s/p HSCT transplant on 11/12/24. Today is day +70   Disease:  --> AML s/p allogeneic stem cell transplant (MUD) ----------Ds status post transplant: cCR  ------------------By MRD monitoring: N/A ------------------By post Chimerism: 11/26/24 chimerism is greater than 97% donor, recipient not detected. ----------Post transplant maintenance therapy: N/A ------------------Indication: N/A ----------DLI given: N/A Disease monitoring: --> Post transplant bone marrow biopsy on Day +30 (completed 12/12/24), Day+ 100 (2/20/25), and Day +180 (5/12/25).   Engraftment: ------Chimerism: 11/26/24 chimerism is greater than 97% chimerism, recipient not detected. ------ANC engraftment date: 11/26/24 ------Platelets engraftment date: 11/29/24, last received platelets on 11/22/24.  ------ABO Rh incompatibility issues:  -->Plan: ------G-CSF support: no ------Transfusions requirements: No ----------------Transfuse if platelets <10K or actively bleeding per SOP. ------Chimerism: monitor every other week and on marrow until d+180, last performed today, 1/21/25 ------Immune reconstitution due on days +100, + 180, + 365   GvHD: -------Acute: No -------Chronic: N/A -------Steroid Taper: N/A -------ECP: N/A -->Plan: ------Reviewed GvHD signs and symptoms to report ------Prophylaxis with ABC regimen. -----------PTcy -----------Abatacept IV 10mg/kg on days +28 (12/10)    ID: ---Monitoring: Send CMV PCR, Adenovirus and EBV weekly until day +180 ----------CMV: negative to date ----------EBV: negative to date ----------Adeno: negative to date --->Continue ppx: ---------PCP: bactrim ppx ---------HSV and VZV: acyclovir 800mg PO BID  ---------Fungal: posaconazole 300mg PO daily  ---------CMV: letermovir 480 mg PO daily ---------SOS prophylaxis: Ursodiol 300 mg BID (until day 30-60). Discontinued 1/7/25. ---------If chronic GvHD present, encapsulated organism coverage: N/A  Survivorship: - 25-hydroxyclaciferol (day +80): will send 1/28 - TSH (annually): - Ferritin (annually): - Fertility (annual, x2 if applicable): - Bone mineral densitometry (day +365 for women, men exposed to prolonged steroids and CNI): - Vaccinations per SOP starting at day 180+ except for Flu at day + 90 and COVID per CDC guidelines.   Other: Plan for line removal 1/21  Hypokalemia, improved  Continue to monitor, pt supplementing with potassium rich foods   Hypotension (improving) Increase fluid intake, maintain 2-3L/day  Continue post transplant diet and crowd restrictions. Questions and concerns addressed. Reviewed signs and symptoms to report to clinic; patient has clinic and after hours number  RTC 1/28 labs, provider visit  Omaira Cooper NP [facility] Adult Transplantation [facility] Cellular Therapy [facility]   I saw the patient on day +70 following his allogeneic HSCT.  I reviewed the data and the above note.   Diagnosis: AML Disease staging at transplant: CR Conditioning regimen: LIV Bu Flu rATG GvHD prophylaxis: ABC Donor type: MUD  Stem cell source: Peripheral ABO  Recipient: A positive  Donor: O positive  Incompatibility: Minor CMV Status  Recipient: Positive  Donor: Negative Toxoplasmosis Status  Recipient: Negative  Donor: Negative  The patient is doing well with no complaints. He has engrafted with full donor chimerism. He has no aGvHD. He has no infectious complications and is on appropriate prophylaxis.  He has no evidence of disease based on his last BM studies.  He has no other issues. He understands his current status and precautions. He will return to clinic in 1 week. I spent a total of 30 minutes performing the above tasks. MARISABEL Mena MD

## 2025-01-21 NOTE — REVIEW OF SYSTEMS
[Fever] : no fever [Chills] : no chills [Night Sweats] : no night sweats [Recent Change In Weight] : ~T no recent weight change [Vision Problems] : no vision problems [Nosebleeds] : no nosebleeds [Shortness Of Breath] : no shortness of breath [SOB on Exertion] : no shortness of breath during exertion [Vomiting] : no vomiting [Constipation] : no constipation [Dysuria] : no dysuria

## 2025-01-21 NOTE — HISTORY OF PRESENT ILLNESS
[de-identified] : 71 year old s/p HSCT for AML    Status post allogeneic transplant, day +70 Transplant physician: Dr. Bay  Referring physician:  Diagnosis: AML  Disease staging at transplant: CR Conditioning regimen: LIV BuFluATG GvHD prophylaxis: PTCyBorAba (ABC) on study  Donor type: MUD                    Mismatches: N/A, 10/10                    Stem cell source: Peripheral  ABO         Recipient: A positive         Donor: O positive          Incompatibility: Minor  CMV Status  Recipient: Positive  Donor: Negative Toxoplasmosis Status  Recipient: Negative   Donor: Negative    PMH:  BPH, AML     -----------------Oncologic Hx:-------------------------------- He had been well, exercising regularly, three miles/day. By early 7/2024, he had developed NGUYEN on climbing stairs. Pancytopenia with circulating blasts were detected and he was admitted to [facility] 7/18/2024. 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 He was treated with decitabine and venetoclax x 3 cycles.    -----------Transplant hospital course:---------------------  Tolerated well, c/b increased BP due to post transplant cytoxan fluids. Additionally c/b hematuria, interstitial mucositis.  -----Day 0 = 11/12/24  --------------Post Transplant Course:-----------------------   -------------Oncological treatments:-------------------------- Decitabine and venetoclax 100mg 21d   ---------------Bone Marrow Bx Results:---------------------- Bone Marrow Biopsy, Clot and Bone Marrow Aspirate, Right PIC -MDS/AML with mutated TP53 ( per International Consensus  Classification of Myeloid Neoplasms and Acute Leukemias) -MDS with biallelic TP53 inactivation (WHO-NHEM9qm ed). Hypercellular marrow for age with multilineage dysplasia, and increased blasts/immature cells (12% on aspirate differential count, 10-15% by CD34 IHC stain). -Abnormal Karyotype 46-47,XY,del(7)(q11.2),+8,del(8)(q13q22)x2,del(9)(q13q22),-11,-12, add(17)(p11.2),del(20)(q11.2q13.3),+1-2mar      {cp13                    }/46,XY      {7                    } -FISH studies detected 7q deletion (60%), Trisomy 8 (62.5%), 20q deletion (59.5%) and TP53 deletion (55.5%). -[employer] Advanced NGS Myeloid Report detected (Tier I) TP53 p.Hlh746Spi (VAF38%) and U2AF1 p.Nmc622Eft (VAF32%). There are increased blasts/immature cells (12% on aspirate differential count and approximately 10-15% of the cellularity by CD34 immunohistochemical stain).  8/15/24:  Bone marrow biopsy and bone marrow aspirate  - Cellular marrow (30-40%) with markedly decreased myelopoiesis, decreased left shifted erythropoiesis and relatively normal megakaryopoiesis with no increase in blast population  - Persistent 7q deletion (1%), trisomy 8 (2.5%), 20q deletion (2.5%) and TP53 deletion (0.5%) by FISH studies, below the cut-off values  _ Persistent mutations in TP53 p.Rqe416Pqa and U2AF1 p.Dfn372Bxm at low VAF levels   10/15/24 (Pretransplant BmBx) ----Morphology: Normocellular bone marrow with trilineage regeneration (focal myeloid predominant, focal erythroid predominant, overall decreased megakaryocytes with focally normal numbers and normal morphology, and increased iron stores (history of MDS/AML with complex karyotype and TP53 deletion, under treatment)      - No morphologic or immunophenotypic findings of bone marrow involvement by leukemia are identified ----Karyotype: Normal ----FISH: deletion of TP53 below cut off value  ----NGS: Normal    - Day 30 post transplant: completed 12/12/24  ----Morphology:SANJEEV ----Karyotype: //46,XX[20] ----FISH: Normal FISH ----[facility]: [facility] Myeloid Panel is normal. No mutations identified.    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 He is feeling well with no major complaints. Reports improvement in cold like symptoms - denies any cough, congestion. Denies fever, vomiting, diarrhea, rash, mouth sores, nausea or any signs of active bleeding. Denies SOB or B/L LE edema. Taking all medications as prescribed, refilled appropriately.

## 2025-01-29 NOTE — HISTORY OF PRESENT ILLNESS
[de-identified] : 71 year old s/p HSCT for AML    Status post allogeneic transplant, day +78 Transplant physician: Dr. Bay  Referring physician:  Diagnosis: AML  Disease staging at transplant: CR Conditioning regimen: LIV BuFluATG GvHD prophylaxis: PTCyBorAba (ABC) on study  Donor type: MUD                    Mismatches: N/A, 10/10                    Stem cell source: Peripheral  ABO         Recipient: A positive         Donor: O positive          Incompatibility: Minor  CMV Status  Recipient: Positive  Donor: Negative Toxoplasmosis Status  Recipient: Negative   Donor: Negative    PMH:  BPH, AML     -----------------Oncologic Hx:-------------------------------- He had been well, exercising regularly, three miles/day. By early 7/2024, he had developed NGUYEN on climbing stairs. Pancytopenia with circulating blasts were detected and he was admitted to [facility] 7/18/2024. 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 He was treated with decitabine and venetoclax x 3 cycles.    -----------Transplant hospital course:---------------------  Tolerated well, c/b increased BP due to post transplant cytoxan fluids. Additionally c/b hematuria, interstitial mucositis.  -----Day 0 = 11/12/24  --------------Post Transplant Course:-----------------------   -------------Oncological treatments:-------------------------- Decitabine and venetoclax 100mg 21d   ---------------Bone Marrow Bx Results:---------------------- Bone Marrow Biopsy, Clot and Bone Marrow Aspirate, Right PIC -MDS/AML with mutated TP53 ( per International Consensus  Classification of Myeloid Neoplasms and Acute Leukemias) -MDS with biallelic TP53 inactivation (WHO-PTEX0qm ed). Hypercellular marrow for age with multilineage dysplasia, and increased blasts/immature cells (12% on aspirate differential count, 10-15% by CD34 IHC stain). -Abnormal Karyotype 46-47,XY,del(7)(q11.2),+8,del(8)(q13q22)x2,del(9)(q13q22),-11,-12, add(17)(p11.2),del(20)(q11.2q13.3),+1-2mar       {cp13                      }/46,XY       {7                      } -FISH studies detected 7q deletion (60%), Trisomy 8 (62.5%), 20q deletion (59.5%) and TP53 deletion (55.5%). -[employer] Advanced NGS Myeloid Report detected (Tier I) TP53 p.Mei844Yez (VAF38%) and U2AF1 p.Cnj350Ywk (VAF32%). There are increased blasts/immature cells (12% on aspirate differential count and approximately 10-15% of the cellularity by CD34 immunohistochemical stain).  8/15/24:  Bone marrow biopsy and bone marrow aspirate  - Cellular marrow (30-40%) with markedly decreased myelopoiesis, decreased left shifted erythropoiesis and relatively normal megakaryopoiesis with no increase in blast population  - Persistent 7q deletion (1%), trisomy 8 (2.5%), 20q deletion (2.5%) and TP53 deletion (0.5%) by FISH studies, below the cut-off values  _ Persistent mutations in TP53 p.Yfz627Qke and U2AF1 p.Iwe693Ykr at low VAF levels   10/15/24 (Pretransplant BmBx) ----Morphology: Normocellular bone marrow with trilineage regeneration (focal myeloid predominant, focal erythroid predominant, overall decreased megakaryocytes with focally normal numbers and normal morphology, and increased iron stores (history of MDS/AML with complex karyotype and TP53 deletion, under treatment)      - No morphologic or immunophenotypic findings of bone marrow involvement by leukemia are identified ----Karyotype: Normal ----FISH: deletion of TP53 below cut off value  ----NGS: Normal    - Day 30 post transplant: completed 12/12/24  ----Morphology:SANJEEV ----Karyotype: //46,XX[20] ----FISH: Normal FISH ----NGS: [facility] Advanced NGS Myeloid Panel is normal. No mutations identified.    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 He is feeling well with no major complaints. Denies fever, vomiting, diarrhea, rash, mouth sores, nausea or any signs of active bleeding. Denies SOB or B/L LE edema. Taking all medications as prescribed.

## 2025-01-29 NOTE — ASSESSMENT
[FreeTextEntry1] : 70 yo with pmhx of AML, s/p HSCT transplant on 11/12/24. 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 ----------Post transplant maintenance therapy: N/A ------------------Indication: N/A ----------DLI given: N/A Disease monitoring: --> Post transplant bone marrow biopsy on Day +30 (completed 12/12/24), Day+ 100 (2/19/25), and Day +180 (5/12/25).   Engraftment: ------Chimerism: 11/26/24 chimerism is greater than 97% chimerism, recipient not detected. ------ANC engraftment date: 11/26/24 ------Platelets engraftment date: 11/29/24, last received platelets on 11/22/24.  ------ABO Rh incompatibility issues:  -->Plan: ------G-CSF support: no ------Transfusions requirements: No ----------------Transfuse if platelets <10K or actively bleeding per SOP. ------Chimerism: monitor every other week and on marrow until d+180, last performed today, 1/21/25 ------Immune reconstitution due on days +100, + 180, + 365   GvHD: -------Acute: No -------Chronic: N/A -------Steroid Taper: N/A -------ECP: N/A -->Plan: ------Reviewed GvHD signs and symptoms to report ------Prophylaxis with ABC regimen. -----------PTcy -----------Abatacept IV 10mg/kg on days +28 (12/10)    ID: ---Monitoring: Send CMV PCR, Adenovirus and EBV weekly until day +180 ----------CMV: negative to date ----------EBV: negative to date ----------Adeno: negative to date --->Continue ppx: ---------PCP: bactrim ppx ---------HSV and VZV: acyclovir 800mg PO BID  ---------Fungal: posaconazole 300mg PO daily until day +75 ---------CMV: letermovir 480 mg PO daily ---------SOS prophylaxis: Ursodiol 300 mg BID (until day 30-60). Discontinued 1/7/25. ---------If chronic GvHD present, encapsulated organism coverage: N/A  Survivorship: - 25-hydroxyclaciferol (day +80):  - TSH (annually): - Ferritin (annually): - Fertility (annual, x2 if applicable): - Bone mineral densitometry (day +365 for women, men exposed to prolonged steroids and CNI): - Vaccinations per SOP starting at day 180+ except for Flu at day + 90 and COVID per CDC guidelines.    Hypokalemia, 3.3 today, 1/29 Advised to take PO potassium daily  Continue to monitor, pt supplementing with potassium rich foods   Hypotension (improving) Increase fluid intake, maintain 2-3L/day  Continue post transplant diet and crowd restrictions. Questions and concerns addressed. Reviewed signs and symptoms to report to clinic; patient has clinic and after hours number  RTC 2/4 labs, provider visit  Omaira Cooper NP [facility] Adult Transplantation and Cellular Therapy Program   I saw the patient on day +78 following his allogeneic HSCT.  I reviewed the data and the above note.   Diagnosis: AML Disease staging at transplant: CR Conditioning regimen: LIV Bu Flu rATG GvHD prophylaxis: ABC Donor type: MUD  Stem cell source: Peripheral ABO  Recipient: A positive  Donor: O positive  Incompatibility: Minor CMV Status  Recipient: Positive  Donor: Negative Toxoplasmosis Status  Recipient: Negative  Donor: Negative  The patient is doing well with no complaints. He has engrafted with full donor chimerism. He has no aGvHD. He has no infectious complications and is on appropriate prophylaxis.  He has no evidence of disease based on his last BM studies.  He has no other issues. He understands his current status and precautions. He will return to clinic in 1 week. I spent a total of 30 minutes performing the above tasks. MARISABEL Mena MD

## 2025-01-29 NOTE — ASSESSMENT
[FreeTextEntry1] : 72 yo with pmhx of AML, s/p HSCT transplant on 11/12/24. 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 ----------Post transplant maintenance therapy: N/A ------------------Indication: N/A ----------DLI given: N/A Disease monitoring: --> Post transplant bone marrow biopsy on Day +30 (completed 12/12/24), Day+ 100 (2/19/25), and Day +180 (5/12/25).   Engraftment: ------Chimerism: 11/26/24 chimerism is greater than 97% chimerism, recipient not detected. ------ANC engraftment date: 11/26/24 ------Platelets engraftment date: 11/29/24, last received platelets on 11/22/24.  ------ABO Rh incompatibility issues:  -->Plan: ------G-CSF support: no ------Transfusions requirements: No ----------------Transfuse if platelets <10K or actively bleeding per SOP. ------Chimerism: monitor every other week and on marrow until d+180, last performed today, 1/21/25 ------Immune reconstitution due on days +100, + 180, + 365   GvHD: -------Acute: No -------Chronic: N/A -------Steroid Taper: N/A -------ECP: N/A -->Plan: ------Reviewed GvHD signs and symptoms to report ------Prophylaxis with ABC regimen. -----------PTcy -----------Abatacept IV 10mg/kg on days +28 (12/10)    ID: ---Monitoring: Send CMV PCR, Adenovirus and EBV weekly until day +180 ----------CMV: negative to date ----------EBV: negative to date ----------Adeno: negative to date --->Continue ppx: ---------PCP: bactrim ppx ---------HSV and VZV: acyclovir 800mg PO BID  ---------Fungal: posaconazole 300mg PO daily until day +75 ---------CMV: letermovir 480 mg PO daily ---------SOS prophylaxis: Ursodiol 300 mg BID (until day 30-60). Discontinued 1/7/25. ---------If chronic GvHD present, encapsulated organism coverage: N/A  Survivorship: - 25-hydroxyclaciferol (day +80):  - TSH (annually): - Ferritin (annually): - Fertility (annual, x2 if applicable): - Bone mineral densitometry (day +365 for women, men exposed to prolonged steroids and CNI): - Vaccinations per SOP starting at day 180+ except for Flu at day + 90 and COVID per CDC guidelines.    Hypokalemia, 3.3 today, 1/29 Advised to take PO potassium daily  Continue to monitor, pt supplementing with potassium rich foods   Hypotension (improving) Increase fluid intake, maintain 2-3L/day  Continue post transplant diet and crowd restrictions. Questions and concerns addressed. Reviewed signs and symptoms to report to clinic; patient has clinic and after hours number  RTC 2/4 labs, provider visit  Omaira Cooper NP [facility] Adult Transplantation and Cellular Therapy Program   I saw the patient on day +78 following his allogeneic HSCT.  I reviewed the data and the above note.   Diagnosis: AML Disease staging at transplant: CR Conditioning regimen: LIV Bu Flu rATG GvHD prophylaxis: ABC Donor type: MUD  Stem cell source: Peripheral ABO  Recipient: A positive  Donor: O positive  Incompatibility: Minor CMV Status  Recipient: Positive  Donor: Negative Toxoplasmosis Status  Recipient: Negative  Donor: Negative  The patient is doing well with no complaints. He has engrafted with full donor chimerism. He has no aGvHD. He has no infectious complications and is on appropriate prophylaxis.  He has no evidence of disease based on his last BM studies.  He has no other issues. He understands his current status and precautions. He will return to clinic in 1 week. I spent a total of 30 minutes performing the above tasks. MARISABEL Mena MD

## 2025-01-29 NOTE — HISTORY OF PRESENT ILLNESS
[de-identified] : 71 year old s/p HSCT for AML    Status post allogeneic transplant, day +78 Transplant physician: Dr. Bay  Referring physician:  Diagnosis: AML  Disease staging at transplant: CR Conditioning regimen: LIV BuFluATG GvHD prophylaxis: PTCyBorAba (ABC) on study  Donor type: MUD                    Mismatches: N/A, 10/10                    Stem cell source: Peripheral  ABO         Recipient: A positive         Donor: O positive          Incompatibility: Minor  CMV Status  Recipient: Positive  Donor: Negative Toxoplasmosis Status  Recipient: Negative   Donor: Negative    PMH:  BPH, AML     -----------------Oncologic Hx:-------------------------------- He had been well, exercising regularly, three miles/day. By early 7/2024, he had developed NGUYEN on climbing stairs. Pancytopenia with circulating blasts were detected and he was admitted to [facility] 7/18/2024. 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 He was treated with decitabine and venetoclax x 3 cycles.    -----------Transplant hospital course:---------------------  Tolerated well, c/b increased BP due to post transplant cytoxan fluids. Additionally c/b hematuria, interstitial mucositis.  -----Day 0 = 11/12/24  --------------Post Transplant Course:-----------------------   -------------Oncological treatments:-------------------------- Decitabine and venetoclax 100mg 21d   ---------------Bone Marrow Bx Results:---------------------- Bone Marrow Biopsy, Clot and Bone Marrow Aspirate, Right PIC -MDS/AML with mutated TP53 ( per International Consensus  Classification of Myeloid Neoplasms and Acute Leukemias) -MDS with biallelic TP53 inactivation (WHO-IVKC8wc ed). Hypercellular marrow for age with multilineage dysplasia, and increased blasts/immature cells (12% on aspirate differential count, 10-15% by CD34 IHC stain). -Abnormal Karyotype 46-47,XY,del(7)(q11.2),+8,del(8)(q13q22)x2,del(9)(q13q22),-11,-12, add(17)(p11.2),del(20)(q11.2q13.3),+1-2mar       {cp13                      }/46,XY       {7                      } -FISH studies detected 7q deletion (60%), Trisomy 8 (62.5%), 20q deletion (59.5%) and TP53 deletion (55.5%). -[employer] Advanced NGS Myeloid Report detected (Tier I) TP53 p.Ujx259Cuy (VAF38%) and U2AF1 p.Kpi469Qhn (VAF32%). There are increased blasts/immature cells (12% on aspirate differential count and approximately 10-15% of the cellularity by CD34 immunohistochemical stain).  8/15/24:  Bone marrow biopsy and bone marrow aspirate  - Cellular marrow (30-40%) with markedly decreased myelopoiesis, decreased left shifted erythropoiesis and relatively normal megakaryopoiesis with no increase in blast population  - Persistent 7q deletion (1%), trisomy 8 (2.5%), 20q deletion (2.5%) and TP53 deletion (0.5%) by FISH studies, below the cut-off values  _ Persistent mutations in TP53 p.Jds456Vcd and U2AF1 p.Cvn904Npl at low VAF levels   10/15/24 (Pretransplant BmBx) ----Morphology: Normocellular bone marrow with trilineage regeneration (focal myeloid predominant, focal erythroid predominant, overall decreased megakaryocytes with focally normal numbers and normal morphology, and increased iron stores (history of MDS/AML with complex karyotype and TP53 deletion, under treatment)      - No morphologic or immunophenotypic findings of bone marrow involvement by leukemia are identified ----Karyotype: Normal ----FISH: deletion of TP53 below cut off value  ----NGS: Normal    - Day 30 post transplant: completed 12/12/24  ----Morphology:SANJEEV ----Karyotype: //46,XX[20] ----FISH: Normal FISH ----[facility] is normal. No mutations identified.    - Day 100 post transplant: Due 2/20/25 ----Morphology: ----Karyotype ----FISH ----NGS:   - Day 180 post transplant: Due 5/12/25 ----Morphology: ----Karyotype ----FISH ----NGS:   ---------Past Surgical Hx---------- Partial mastoidectomy and requires periodic left mastoid debridement  -------- -Social Hx ----------------- The patient is  and has 2 daughters, 30 and 27y/o local in [place]  Patient has one brother (69)  Work: Nephrologist  Born in: [place]  Never a smoker/Social alcohol use  ----------Past Family Hx:------------ Noncontributory  [de-identified] : 1/29/25: Day +78 post-transplant. He is feeling well with no major complaints. Denies fever, vomiting, diarrhea, rash, mouth sores, nausea or any signs of active bleeding. Denies SOB or B/L LE edema. Taking all medications as prescribed.

## 2025-02-12 NOTE — ASSESSMENT
[FreeTextEntry1] : 70 yo with pmhx of AML, s/p HSCT transplant on 11/12/24. Today is day +92.   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 ----------Post transplant maintenance therapy: N/A ------------------Indication: N/A ----------DLI given: N/A Disease monitoring: --> Post transplant bone marrow biopsy on Day +30 (completed 12/12/24), Day+ 100 (2/19/25), and Day +180 (5/12/25).   Engraftment: ------Chimerism: 11/26/24 chimerism is greater than 97% chimerism, recipient not detected. ------ANC engraftment date: 11/26/24 ------Platelets engraftment date: 11/29/24, last received platelets on 11/22/24.  ------ABO Rh incompatibility issues:  -->Plan: ------G-CSF support: no ------Transfusions requirements: No ----------------Transfuse if platelets <10K or actively bleeding per SOP. ------Chimerism: monitor every other week and on marrow until d+180, last performed 2/5/25 (97% donor, recipient not detected).  ------Immune reconstitution due on days +100, + 180, + 365   GvHD: -------Acute: No -------Chronic: N/A -------Steroid Taper: N/A -------ECP: N/A -->Plan: ------Reviewed GvHD signs and symptoms to report ------Prophylaxis with ABC regimen. -----------PTcy -----------Abatacept IV 10mg/kg on days +28 (12/10)    ID: ---Monitoring: Send CMV PCR, Adenovirus and EBV weekly until day +180 ----------CMV: negative to date, pending today's result ----------EBV: Positive on 1/29/25= 591. 2/4/25 EBV PCR 49,500 copies. Treated with Ruxience 2/5/25 at [facility]. Received Ruxience on 2/12/25. Pending 2/12/15 results may treat with Ruxience on 2/19/25. ----------Adeno: negative to date, pending today's result --->Continue ppx: ---------PCP: bactrim ppx ---------HSV and VZV: acyclovir 800mg PO BID  ---------Fungal: posaconazole 300mg PO daily until day +75. Discontinued.  ---------CMV: letermovir 480 mg PO daily ---------SOS prophylaxis: Ursodiol 300 mg BID (until day 30-60). Discontinued 1/7/25. ---------If chronic GvHD present, encapsulated organism coverage: N/A  Survivorship: - 25-hydroxyclaciferol (day +80): Will send on 2/19/25 - TSH (annually): - Ferritin (annually): - Fertility (annual, x2 if applicable): - Bone mineral densitometry (day +365 for women, men exposed to prolonged steroids and CNI): - Vaccinations per SOP starting at day 180+ except for Flu at day + 90 and COVID per CDC guidelines.    Hypotension (RESOLVED) Increase fluid intake, maintain 2-3L/day  Physical Therapy Referral for [facility] Transplant cellular therapy ordered on 2/12/25.  Met with nutritionist on 2/12/25 in the treatment room  Continue post transplant diet and crowd restrictions. Questions and concerns addressed. Reviewed signs and symptoms to report to clinic; patient has clinic and after hours number  RTC 2/19 labs, provider visit  Linette Houston NP [facility] Adult Transplantation and Cellular Therapy Program   I saw the patient on day +92 following his allogeneic HSCT.  I reviewed the data and the above note.   Diagnosis: AML Disease staging at transplant: CR Conditioning regimen: LIV Bu Flu rATG GvHD prophylaxis: ABC Donor type: MUD  Stem cell source: Peripheral ABO  Recipient: A positive  Donor: O positive  Incompatibility: Minor CMV Status  Recipient: Positive  Donor: Negative Toxoplasmosis Status  Recipient: Negative  Donor: Negative  Since last seen, the patient was admitted to the hospital with fever and diarrhea. His work-up remained negative except for EBV reactivation. He was treated with rituximab and his fever resolved. His antibiotics were discontinued and he was discharged. His diarrhea improved spontaneously in the hospital.  Since his discharge, the patient has been doing well overall. He is week and lacks energy. He has some residual sore throat limiting somewhat his intake. He has 2 soft stools daily.  On examination, he has some mild congestion of his oropharynx. He has no skin rash. He has no enlarged LN and no HSM.  He has engrafted with full donor chimerism. His counts are adequate.  He has no aGvHD.  He has no infectious complications other than EBV reactivation. He will get his second dose of rituximab. His repeat viral load is pending. He is on appropriate prophylaxis.  He has no evidence of disease based on his last BM studies.  He has no other issues. He understands his current status and precautions. We will set him up for outpatient PT and he will be evaluated by our nutritionist.  He will return to clinic in 1 week. He and his wife were encouraged to call in case of new issues.  I spent a total of 45 minutes performing the above tasks. MARISABEL Mena MD

## 2025-02-12 NOTE — HISTORY OF PRESENT ILLNESS
[de-identified] : 71 year old s/p HSCT for AML    Status post allogeneic transplant, day +92 Transplant physician: Dr. Bay  Referring physician:  Diagnosis: AML  Disease staging at transplant: CR Conditioning regimen: LIV BuFluATG GvHD prophylaxis: PTCyBorAba (ABC) on study  Donor type: MUD                    Mismatches: N/A, 10/10                    Stem cell source: Peripheral  ABO         Recipient: A positive         Donor: O positive          Incompatibility: Minor  CMV Status  Recipient: Positive  Donor: Negative Toxoplasmosis Status  Recipient: Negative   Donor: Negative    PMH:  BPH, AML     -----------------Oncologic Hx:-------------------------------- He had been well, exercising regularly, three miles/day. By early 7/2024, he had developed NGUYEN on climbing stairs. Pancytopenia with circulating blasts were detected and he was admitted to [facility] 7/18/2024. 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 He was treated with decitabine and venetoclax x 3 cycles.    -----------Transplant hospital course:---------------------  Tolerated well, c/b increased BP due to post transplant cytoxan fluids. Additionally c/b hematuria, interstitial mucositis.  -----Day 0 = 11/12/24  --------------Post Transplant Course:-----------------------   -------------Oncological treatments:-------------------------- Decitabine and venetoclax 100mg 21d   ---------------Bone Marrow Bx Results:---------------------- Bone Marrow Biopsy, Clot and Bone Marrow Aspirate, Right PIC -MDS/AML with mutated TP53 ( per International Consensus  Classification of Myeloid Neoplasms and Acute Leukemias) -MDS with biallelic TP53 inactivation (WHO-VTEQ3vc ed). Hypercellular marrow for age with multilineage dysplasia, and increased blasts/immature cells (12% on aspirate differential count, 10-15% by CD34 IHC stain). -Abnormal Karyotype 46-47,XY,del(7)(q11.2),+8,del(8)(q13q22)x2,del(9)(q13q22),-11,-12, add(17)(p11.2),del(20)(q11.2q13.3),+1-2mar        {cp13                         }/46,XY        {7                         } -FISH studies detected 7q deletion (60%), Trisomy 8 (62.5%), 20q deletion (59.5%) and TP53 deletion (55.5%). -[employer] Advanced NGS Myeloid Report detected (Tier I) TP53 p.Qwb078Huj (VAF38%) and U2AF1 p.Lwy043Kcm (VAF32%). There are increased blasts/immature cells (12% on aspirate differential count and approximately 10-15% of the cellularity by CD34 immunohistochemical stain).  8/15/24:  Bone marrow biopsy and bone marrow aspirate  - Cellular marrow (30-40%) with markedly decreased myelopoiesis, decreased left shifted erythropoiesis and relatively normal megakaryopoiesis with no increase in blast population  - Persistent 7q deletion (1%), trisomy 8 (2.5%), 20q deletion (2.5%) and TP53 deletion (0.5%) by FISH studies, below the cut-off values  _ Persistent mutations in TP53 p.Vug070Eji and U2AF1 p.Suu280Ozh at low VAF levels   10/15/24 (Pretransplant BmBx) ----Morphology: Normocellular bone marrow with trilineage regeneration (focal myeloid predominant, focal erythroid predominant, overall decreased megakaryocytes with focally normal numbers and normal morphology, and increased iron stores (history of MDS/AML with complex karyotype and TP53 deletion, under treatment)      - No morphologic or immunophenotypic findings of bone marrow involvement by leukemia are identified ----Karyotype: Normal ----FISH: deletion of TP53 below cut off value  ----NGS: Normal    - Day 30 post transplant: completed 12/12/24  ----Morphology:SANJEEV ----Karyotype: //46,XX[20] ----FISH: Normal FISH ----[facility] is normal. No mutations identified.    - Day 100 post transplant: Due 2/20/25. Scheduled on 2/19/25.  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 Recently admitted 2/4/25-2/10/25 for fatigue, weakness and fever for 24 hours. Labs on 2/4/25 were significant for EBV PCR 49,500 copies. Treated with ruxience with improvement of symptoms on 2/5/25. Complains today of fatigue, sore throat(improving) and nonproductive cough. Patient had the cough and sore throat while admitted at [facility] and every day his symptoms continue to improve. Denies fever, vomiting, diarrhea, rash, mouth sores, nausea or any signs of active bleeding. Denies SOB or B/L LE edema. Taking all medications as prescribed.

## 2025-02-12 NOTE — REVIEW OF SYSTEMS
[Fatigue] : fatigue [Cough] : cough [Negative] : Eyes [Fever] : no fever [Chills] : no chills [Night Sweats] : no night sweats [Recent Change In Weight] : ~T no recent weight change [Vision Problems] : no vision problems [Nosebleeds] : no nosebleeds [Shortness Of Breath] : no shortness of breath [Wheezing] : no wheezing [SOB on Exertion] : no shortness of breath during exertion [Vomiting] : no vomiting [Constipation] : no constipation [Dysuria] : no dysuria [FreeTextEntry6] : Nonproductive cough, sore throat.  [de-identified] : Dry Skin

## 2025-02-12 NOTE — PHYSICAL EXAM
[Restricted in physically strenuous activity but ambulatory and able to carry out work of a light or sedentary nature] : Status 1- Restricted in physically strenuous activity but ambulatory and able to carry out work of a light or sedentary nature, e.g., light house work, office work [Normal] : affect appropriate [de-identified] : Dry Skin

## 2025-02-19 NOTE — PHYSICAL EXAM
[Restricted in physically strenuous activity but ambulatory and able to carry out work of a light or sedentary nature] : Status 1- Restricted in physically strenuous activity but ambulatory and able to carry out work of a light or sedentary nature, e.g., light house work, office work [Normal] : affect appropriate [de-identified] : Dry Skin

## 2025-02-19 NOTE — REVIEW OF SYSTEMS
[Fatigue] : fatigue [Cough] : cough [Negative] : Heme/Lymph [Fever] : no fever [Chills] : no chills [Night Sweats] : no night sweats [Recent Change In Weight] : ~T no recent weight change [Vision Problems] : no vision problems [Nosebleeds] : no nosebleeds [Shortness Of Breath] : no shortness of breath [Wheezing] : no wheezing [SOB on Exertion] : no shortness of breath during exertion [Vomiting] : no vomiting [Constipation] : no constipation [Dysuria] : no dysuria [FreeTextEntry6] : Nonproductive cough, improved [de-identified] : Dry Skin

## 2025-02-19 NOTE — HISTORY OF PRESENT ILLNESS
[de-identified] : 71 year old s/p HSCT for AML    Status post allogeneic transplant, day +99 Transplant physician: Dr. Bay  Referring physician:  Diagnosis: AML  Disease staging at transplant: CR Conditioning regimen: LIV BuFluATG GvHD prophylaxis: PTCyBorAba (ABC) on study  Donor type: MUD                    Mismatches: N/A, 10/10                    Stem cell source: Peripheral  ABO         Recipient: A positive         Donor: O positive          Incompatibility: Minor  CMV Status  Recipient: Positive  Donor: Negative Toxoplasmosis Status  Recipient: Negative   Donor: Negative    PMH:  BPH, AML     -----------------Oncologic Hx:-------------------------------- He had been well, exercising regularly, three miles/day. By early 7/2024, he had developed NGUYEN on climbing stairs. Pancytopenia with circulating blasts were detected and he was admitted to [facility] 7/18/2024. 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 He was treated with decitabine and venetoclax x 3 cycles.    -----------Transplant hospital course:---------------------  Tolerated well, c/b increased BP due to post transplant cytoxan fluids. Additionally c/b hematuria, interstitial mucositis.  -----Day 0 = 11/12/24  --------------Post Transplant Course:-----------------------   -------------Oncological treatments:-------------------------- Decitabine and venetoclax 100mg 21d   ---------------Bone Marrow Bx Results:---------------------- Bone Marrow Biopsy, Clot and Bone Marrow Aspirate, Right PIC -MDS/AML with mutated TP53 ( per International Consensus  Classification of Myeloid Neoplasms and Acute Leukemias) -MDS with biallelic TP53 inactivation (WHO-SMKY0fo ed). Hypercellular marrow for age with multilineage dysplasia, and increased blasts/immature cells (12% on aspirate differential count, 10-15% by CD34 IHC stain). -Abnormal Karyotype 46-47,XY,del(7)(q11.2),+8,del(8)(q13q22)x2,del(9)(q13q22),-11,-12, add(17)(p11.2),del(20)(q11.2q13.3),+1-2mar        {cp13                         \\\}/46,XY        {7                         \\\} -FISH studies detected 7q deletion (60%), Trisomy 8 (62.5%), 20q deletion (59.5%) and TP53 deletion (55.5%). -OnkoSit Advanced NGS Myeloid Report detected (Tier I) TP53 p.Chy952Oxm (VAF38%) and U2AF1 p.Peb721Ocs (VAF32%). There are increased blasts/immature cells (12% on aspirate differential count and approximately 10-15% of the cellularity by CD34 immunohistochemical stain).  8/15/24:  Bone marrow biopsy and bone marrow aspirate  - Cellular marrow (30-40%) with markedly decreased myelopoiesis, decreased left shifted erythropoiesis and relatively normal megakaryopoiesis with no increase in blast population  - Persistent 7q deletion (1%), trisomy 8 (2.5%), 20q deletion (2.5%) and TP53 deletion (0.5%) by FISH studies, below the cut-off values  _ Persistent mutations in TP53 p.Xlw885Cdg and U2AF1 p.Nkv782Qkj at low VAF levels  10/15/24 (Pretransplant BmBx) ----Morphology: Normocellular bone marrow with trilineage regeneration (focal myeloid predominant, focal erythroid predominant, overall decreased megakaryocytes with focally normal numbers and normal morphology, and increased iron stores (history of MDS/AML with complex karyotype and TP53 deletion, under treatment)      - No morphologic or immunophenotypic findings of bone marrow involvement by leukemia are identified ----Karyotype: Normal ----FISH: deletion of TP53 below cut off value  ----NGS: Normal    - Day 30 post transplant: completed 12/12/24  ----Morphology:SANJEEV ----Karyotype: //46,XX[20] ----FISH: Normal FISH ----NGS: [facility] [place] [facility] Advanced NGS Myeloid Panel is normal. No mutations identified.    - Day 100 post transplant: completed 2/19/25- pending results  ----Morphology: ----Karyotype ----FISH ----NGS:   - Day 180 post transplant: Due 5/12/25 ----Morphology: ----Karyotype ----FISH ----NGS:   ---------Past Surgical Hx---------- Partial mastoidectomy and requires periodic left mastoid debridement  -------- -Social Hx ----------------- The patient is  and has 2 daughters, 30 and 29y/o local in [place]  Patient has one brother (69)  Work: Nephrologist  Born in: [place]  Never a smoker/Social alcohol use  ----------Past Family Hx:------------ Noncontributory  [de-identified] : 2/19/25: Day +99 post-transplant. Plan for ruxience during visit today and day +100 bone marrow biopsy. He continues to have increase fatigue and reports slow improvement in energy every day. Poor PO intake (<2 meals), pt with 5 lb weight loss. Encouraged to increase PO intake including boost, protein drinks as tolerated. Continues to have nonproductive cough but reports improvement.  Denies fever, vomiting, diarrhea, rash, mouth sores, nausea or any signs of active bleeding. Denies SOB or B/L LE edema. Taking all medications as prescribed.

## 2025-02-19 NOTE — ASSESSMENT
[FreeTextEntry1] : 70 yo with pmhx of AML, s/p HSCT transplant on 11/12/24. Today is day +99.   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 ----------Post transplant maintenance therapy: N/A ------------------Indication: N/A ----------DLI given: N/A Disease monitoring: --> Post transplant bone marrow biopsy on Day +30 (completed 12/12/24), Day+ 100 (2/19/25), and Day +180 (5/12/25).   Engraftment: ------Chimerism: 11/26/24 chimerism is greater than 97% chimerism, recipient not detected. ------ANC engraftment date: 11/26/24 ------Platelets engraftment date: 11/29/24, last received platelets on 11/22/24.  ------ABO Rh incompatibility issues:  -->Plan: ------G-CSF support: no ------Transfusions requirements: No ----------------Transfuse if platelets <10K or actively bleeding per SOP. ------Chimerism: monitor every other week and on marrow until d+180, last performed 2/5/25 (97% donor, recipient not detected).  ------Immune reconstitution due on days +100, + 180, + 365   GvHD: -------Acute: No -------Chronic: N/A -------Steroid Taper: N/A -------ECP: N/A -->Plan: ------Reviewed GvHD signs and symptoms to report ------Prophylaxis with ABC regimen. -----------PTcy -----------Abatacept IV 10mg/kg on days +28 (12/10)    ID: ---Monitoring: Send CMV PCR, Adenovirus and EBV weekly until day +180 ----------CMV: negative to date, pending today's result ----------EBV: Positive on 1/29/25= 591. 2/4/25 EBV PCR 49,500 copies. Treated with Ruxience 2/5/25 at [facility]. Received Ruxience on 2/12/25, EBV downtrending to 5630, plan for total of 4 doses (#3/4 on 2/19) ----------Adeno: negative to date, pending today's result --->Continue ppx: ---------PCP: bactrim ppx ---------HSV and VZV: acyclovir 800mg PO BID  ---------Fungal: posaconazole 300mg PO daily until day +75 ---------CMV: letermovir 480 mg PO daily ---------SOS prophylaxis: Ursodiol 300 mg BID (until day 30-60). Discontinued 1/7/25. ---------If chronic GvHD present, encapsulated organism coverage: N/A  Survivorship: - 25-hydroxyclaciferol (day +80): pending from  2/19/25 - TSH (annually): - Ferritin (annually): - Fertility (annual, x2 if applicable): - Bone mineral densitometry (day +365 for women, men exposed to prolonged steroids and CNI): - Vaccinations per SOP starting at day 180+ except for Flu at day + 90 and COVID per CDC guidelines.    Hypotension (RESOLVED) Increase fluid intake, maintain 2-3L/day  Physical Therapy Referral for [facility] Transplant cellular therapy ordered on 2/12/25.  Continue post transplant diet and crowd restrictions. Questions and concerns addressed. Reviewed signs and symptoms to report to clinic; patient has clinic and after hours number  RTC 2/25 labs, provider visit  Omaira Cooper NP [facility] Adult Transplantation and Cellular Therapy Program   I saw the patient on day +99 following his allogeneic HSCT.  I reviewed the data and the above note.   Diagnosis: AML Disease staging at transplant: CR Conditioning regimen: LIV Bu Flu rATG GvHD prophylaxis: ABC Donor type: MUD  Stem cell source: Peripheral ABO  Recipient: A positive  Donor: O positive  Incompatibility: Minor CMV Status  Recipient: Positive  Donor: Negative Toxoplasmosis Status  Recipient: Negative  Donor: Negative  The patient is feeling better.  His energy and intake are improved. He has no sore throat. He has 2 soft stools a day.  He has engrafted with full donor chimerism. His counts are adequate.  He has no aGvHD.  He has no infectious complications other than EBV reactivation. He will get his third dose of rituximab. His viral load has decreased. His repeat viral load from today is pending. He is on appropriate prophylaxis.  He has no evidence of disease based on his last BM studies. We will repeat his BM studies in the coming 1-2 weeks.  He has no other issues. He understands his current status and precautions.  He will return to clinic in 1 week. He and his wife were encouraged to call in case of new issues.  I spent a total of 45 minutes performing the above tasks. MARISABEL Mena MD

## 2025-02-25 NOTE — ASSESSMENT
[FreeTextEntry1] : 72 yo with pmhx of AML, s/p HSCT transplant on 11/12/24. Today is day +105.   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 ----------Post transplant maintenance therapy: N/A ------------------Indication: N/A ----------DLI given: N/A Disease monitoring: --> Post transplant bone marrow biopsy on Day +30 (completed 12/12/24), Day+ 100 (2/19/25), and Day +180 (5/12/25).   Engraftment: ------Chimerism: 11/26/24 chimerism is greater than 97% chimerism, recipient not detected. ------ANC engraftment date: 11/26/24 ------Platelets engraftment date: 11/29/24, last received platelets on 11/22/24.  ------ABO Rh incompatibility issues:  -->Plan: ------G-CSF support: no ------Transfusions requirements: No ----------------Transfuse if platelets <10K or actively bleeding per SOP. ------Chimerism: monitor every other week and on marrow until d+180, last performed 2/5/25 (97% donor, recipient not detected).  ------Immune reconstitution due on days +100, + 180, + 365   GvHD: -------Acute: No -------Chronic: N/A -------Steroid Taper: N/A -------ECP: N/A -->Plan: ------Reviewed GvHD signs and symptoms to report ------Prophylaxis with ABC regimen. -----------PTcy -----------Abatacept IV 10mg/kg on days +28 (12/10)    ID: ---Monitoring: Send CMV PCR, Adenovirus and EBV weekly until day +180 ----------CMV: negative to date, pending today's result ----------EBV: Positive on 1/29/25= 591. 2/4/25 EBV PCR 49,500 copies. Treated with Ruxience 2/5/25 at [facility]. Received Ruxience on 2/12/25, EBV downtrending to 5630, Received last dose of Ruxience on 2/25/2025, EBV downtrending to 457. Pending EBV results for today. ---------Adeno: negative to date, pending today's result --->Continue ppx: ---------PCP: Bactrim ppx ---------HSV and VZV: acyclovir 800mg PO BID  ---------Fungal: posaconazole 300mg PO daily until day +75 ---------CMV: letermovir 480 mg PO daily ---------SOS prophylaxis: Ursodiol 300 mg BID (until day 30-60). Discontinued 1/7/25. ---------If chronic GvHD present, encapsulated organism coverage: N/A  Survivorship: - 25-hydroxyclaciferol (day +80): pending from  2/19/25 - TSH (annually): - Ferritin (annually): - Fertility (annual, x2 if applicable): - Bone mineral densitometry (day +365 for women, men exposed to prolonged steroids and CNI): - Vaccinations per SOP starting at day 180+ except for Flu at day + 90 and COVID per CDC guidelines.    Hypotension (RESOLVED) Increase fluid intake, maintain 2-3L/day  Physical Therapy Referral for [facility] Transplant cellular therapy ordered on 2/12/25.  Continue post transplant diet and crowd restrictions. Questions and concerns addressed. Reviewed signs and symptoms to report to clinic; patient has clinic and after hours number  RTC 3/5 labs, provider visit  Aracelis Heller NP [facility] Adult Transplantation and Cellular Therapy Program   I saw the patient on day +105 following his allogeneic HSCT.  I reviewed the data and the above note.   Diagnosis: AML Disease staging at transplant: CR Conditioning regimen: LIV Bu Flu rATG GvHD prophylaxis: ABC Donor type: MUD  Stem cell source: Peripheral ABO  Recipient: A positive  Donor: O positive  Incompatibility: Minor CMV Status  Recipient: Positive  Donor: Negative Toxoplasmosis Status  Recipient: Negative  Donor: Negative  The patient is feeling better.  His energy and intake are improved. He has no complaints. His appetite is excellent and he has walked up to 1 mile a day.  He has engrafted with full donor chimerism. His counts are adequate.  He has no aGvHD.  He has no infectious complications other than EBV reactivation. He will get his 4th dose of rituximab. His viral load has decreased. His repeat viral load from today is pending. He is on appropriate prophylaxis.  His BM studies are thus far negative.  He has no other issues. He understands his current status and precautions.  He will return to clinic in 1 week. He and his wife were encouraged to call in case of new issues.  I spent a total of 40 minutes performing the above tasks. MARISABEL Mena MD

## 2025-02-25 NOTE — PHYSICAL EXAM
[Restricted in physically strenuous activity but ambulatory and able to carry out work of a light or sedentary nature] : Status 1- Restricted in physically strenuous activity but ambulatory and able to carry out work of a light or sedentary nature, e.g., light house work, office work [Normal] : affect appropriate [de-identified] : Dry Skin

## 2025-02-25 NOTE — REVIEW OF SYSTEMS
[Fatigue] : fatigue [Cough] : cough [Negative] : Heme/Lymph [Fever] : no fever [Chills] : no chills [Night Sweats] : no night sweats [Recent Change In Weight] : ~T no recent weight change [Vision Problems] : no vision problems [Nosebleeds] : no nosebleeds [Shortness Of Breath] : no shortness of breath [Wheezing] : no wheezing [SOB on Exertion] : no shortness of breath during exertion [Vomiting] : no vomiting [Constipation] : no constipation [Dysuria] : no dysuria [FreeTextEntry6] : Nonproductive cough, improved [de-identified] : Dry Skin

## 2025-02-25 NOTE — HISTORY OF PRESENT ILLNESS
[Research Protocol] : Research Protocol  [de-identified] : 71 year old s/p HSCT for AML    Status post allogeneic transplant, day +105 Transplant physician: Dr. Bay  Referring physician:  Diagnosis: AML  Disease staging at transplant: CR Conditioning regimen: LIV BuFluATG GvHD prophylaxis: PTCyBorAba (ABC) on study  Donor type: MUD                    Mismatches: N/A, 10/10                    Stem cell source: Peripheral  ABO         Recipient: A positive         Donor: O positive          Incompatibility: Minor  CMV Status  Recipient: Positive  Donor: Negative Toxoplasmosis Status  Recipient: Negative   Donor: Negative    PMH:  BPH, AML     -----------------Oncologic Hx:-------------------------------- He had been well, exercising regularly, three miles/day. By early 7/2024, he had developed NGUYEN on climbing stairs. Pancytopenia with circulating blasts were detected and he was admitted to [facility] 7/18/2024. 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 He was treated with decitabine and venetoclax x 3 cycles.    -----------Transplant hospital course:---------------------  Tolerated well, c/b increased BP due to post transplant cytoxan fluids. Additionally c/b hematuria, interstitial mucositis.  -----Day 0 = 11/12/24  --------------Post Transplant Course:-----------------------   -------------Oncological treatments:-------------------------- Decitabine and venetoclax 100mg 21d   ---------------Bone Marrow Bx Results:---------------------- Bone Marrow Biopsy, Clot and Bone Marrow Aspirate, Right PIC -MDS/AML with mutated TP53 ( per International Consensus  Classification of Myeloid Neoplasms and Acute Leukemias) -MDS with biallelic TP53 inactivation (WHO-TCGI6hr ed). Hypercellular marrow for age with multilineage dysplasia, and increased blasts/immature cells (12% on aspirate differential count, 10-15% by CD34 IHC stain). -Abnormal Karyotype 46-47,XY,del(7)(q11.2),+8,del(8)(q13q22)x2,del(9)(q13q22),-11,-12, add(17)(p11.2),del(20)(q11.2q13.3),+1-2mar           {cp13                                  }/46,XY           {7                                  } -FISH studies detected 7q deletion (60%), Trisomy 8 (62.5%), 20q deletion (59.5%) and TP53 deletion (55.5%). -OnkoSit Advanced NGS Myeloid Report detected (Tier I) TP53 p.Jwo640Wjx (VAF38%) and U2AF1 p.Rii682Dkj (VAF32%). There are increased blasts/immature cells (12% on aspirate differential count and approximately 10-15% of the cellularity by CD34 immunohistochemical stain).  8/15/24:  Bone marrow biopsy and bone marrow aspirate  - Cellular marrow (30-40%) with markedly decreased myelopoiesis, decreased left shifted erythropoiesis and relatively normal megakaryopoiesis with no increase in blast population  - Persistent 7q deletion (1%), trisomy 8 (2.5%), 20q deletion (2.5%) and TP53 deletion (0.5%) by FISH studies, below the cut-off values  _ Persistent mutations in TP53 p.Glm874Ijc and U2AF1 p.Jab922Lci at low VAF levels  10/15/24 (Pretransplant BmBx) ----Morphology: Normocellular bone marrow with trilineage regeneration (focal myeloid predominant, focal erythroid predominant, overall decreased megakaryocytes with focally normal numbers and normal morphology, and increased iron stores (history of MDS/AML with complex karyotype and TP53 deletion, under treatment)      - No morphologic or immunophenotypic findings of bone marrow involvement by leukemia are identified ----Karyotype: Normal ----FISH: deletion of TP53 below cut off value  ----NGS: Normal    - Day 30 post transplant: completed 12/12/24  ----Morphology:SANJEEV ----Karyotype: //46,XX[20] ----FISH: Normal FISH ----N[facility]: [facility] Myeloid Panel is normal. No mutations identified.    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 Received last dose of ruxience today. Fatigue has improved with increased energy levels. Reports to walk 1 mile a day. PO intake is fair; eating ~2 meals/day. Reports improvement with nonrpudcitve cough. Denies  fever, vomiting, diarrhea, rash, mouth sores, nausea or any signs of active bleeding. Denies SOB or B/L LE edema. Taking all medications as prescribed.

## 2025-03-02 NOTE — HISTORY OF PRESENT ILLNESS
-Urine is normal (no signs of infection, blood, or kidney problem).   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 He is being treated with decitabine and venetoclax, cycle 2 started 8/26/24. He presents to discuss the role of an allogeneic HSCT in the management of their AML, s/p one line of treatment. I had the pleasure of seeing the patient in consultation, who was referred by Dr. Anderson. Patient is accompanied by his wife.   ----------------------PmHX:----------------------------------------  BPH  --------------------Oncologic Hx: ---------------------------------  He had been well, exercising regularly, three miles/day. By early 7/2024, he had developed NGUYEN on climbing stairs. Pancytopenia with circulating blasts were detected and he was admitted to [facility] 7/18/2024. 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  --------------------Interval Hx: -------------------------------------  Patient is feeling well overall, walking on the track 2-3 miles/day. 9/17/24: Patient is feeling well, he's walking three miles/day, 19 minute mile pace.  Cycle 2 complete, cycle 3 is pending count recovery.  Denies n/v/d, fever, rash, chills, pain.   --------------------Treatment Hx: ---------------------------------  decitabine and venetoclax 100mg 21d   ------------------Imaging Hx:---------------------------------------  CT C/A/P showed small, non specific lung nodules and prostatomegaly.  --------------Bone marrow Biopsy Results: ------------------  Bone Marrow Biopsy, Clot and Bone Marrow Aspirate, Right PIC -MDS/AML with mutated TP53 ( per International Consensus  Classification of Myeloid Neoplasms and Acute Leukemias) -MDS with biallelic TP53 inactivation (WHO-LHQA3cd ed). Hypercellular marrow for age with multilineage dysplasia, and increased blasts/immature cells (12% on aspirate differential count, 10-15% by CD34 IHC stain). -Abnormal Karyotype 46-47,XY,del(7)(q11.2),+8,del(8)(q13q22)x2,del(9)(q13q22),-11,-12, add(17)(p11.2),del(20)(q11.2q13.3),+1-2mar         {cp13                    \}/46,XY         {7                    \} -FISH studies detected 7q deletion (60%), Trisomy 8 (62.5%), 20q deletion (59.5%) and TP53 deletion (55.5%). -[facility] Myeloid Report detected (Tier I) TP53 p.Lij303Sle (VAF38%) and U2AF1 p.Jzy543Yuc (VAF32%). There are increased blasts/immature cells (12% on aspirate differential count and approximately 10-15% of the cellularity by CD34 immunohistochemical stain).  8/15/24:  Bone marrow biopsy and bone marrow aspirate      -  Cellular marrow (30-40%) with  markedly decreased myelopoiesis, decreased left shifted erythropoiesis and relatively normal megakaryopoiesis with no increase in blast population     -  Persistent 7q deletion (1%), trisomy 8 (2.5%), 20q deletion (2.5%) and TP53 deletion (0.5%)  by FISH studies, below the cut-off values    _ Persistent mutations in    TP53 p.Vrn273Vnq and U2AF1 p.Yay013Qsg at low VAF levels  ----------------------Social Hx: ------------------------------------   The patient is  and has 2 daughters, 30 and 29y/o local in [place]  Patient has one brother (69)  Work: Nephrologist  Born in:   Never a smoker/Social alcohol use   ------------------Surgical History: -------------------------------  partial mastoidectomy and requires periodic left mastoid debridement.  -----------------Past family history:------------------------------  noncontributory

## 2025-03-06 NOTE — REVIEW OF SYSTEMS
[Fatigue] : fatigue [FreeTextEntry6] : Nonproductive cough, improved [Negative] : Respiratory [Fever] : no fever [Chills] : no chills [Night Sweats] : no night sweats [Recent Change In Weight] : ~T no recent weight change [Vision Problems] : no vision problems [Nosebleeds] : no nosebleeds [Shortness Of Breath] : no shortness of breath [Wheezing] : no wheezing [Cough] : no cough [SOB on Exertion] : no shortness of breath during exertion [Vomiting] : no vomiting [Constipation] : no constipation [Dysuria] : no dysuria [de-identified] : Dry Skin

## 2025-03-06 NOTE — ASSESSMENT
[FreeTextEntry1] : 70 yo with pmhx of AML, s/p HSCT transplant on 11/12/24. Today is day +113.   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 ----------Post transplant maintenance therapy: N/A ------------------Indication: N/A ----------DLI given: N/A Disease monitoring: --> Post transplant bone marrow biopsy on Day +30 (completed 12/12/24), Day+ 100 (2/19/25), and Day +180 (5/12/25).   Engraftment: ------Chimerism: 11/26/24 chimerism is greater than 97% chimerism, recipient not detected. ------ANC engraftment date: 11/26/24 ------Platelets engraftment date: 11/29/24, last received platelets on 11/22/24.  ------ABO Rh incompatibility issues:  -->Plan: ------G-CSF support: no ------Transfusions requirements: No ----------------Transfuse if platelets <10K or actively bleeding per SOP. ------Chimerism: monitor every other week and on marrow until d+180, last performed 2/19/25 (97% donor, recipient not detected). Pending 3/5/25 result.  ------Immune reconstitution due on days +100, + 180, + 365   GvHD: -------Acute: No -------Chronic: N/A -------Steroid Taper: N/A -------ECP: N/A -->Plan: ------Reviewed GvHD signs and symptoms to report ------Prophylaxis with ABC regimen. -----------PTcy -----------Abatacept IV 10mg/kg on days +28 (12/10)    ID: ---Monitoring: Send CMV PCR, Adenovirus and EBV weekly until day +180 ----------CMV: negative to date, pending today's result ----------EBV: Positive on 1/29/25= 591. 2/4/25 EBV PCR 49,500 copies. Treated with Ruxience 2/5/25 at [facility]. Received Ruxience on 2/12/25, EBV downtrending to 5630, Received last dose of Ruxience on 2/25/2025, EBV downtrending to 457. 3/5/25 result less than 35. Continue to monitor.  ---------Adeno: negative to date, pending today's result --->Continue ppx: ---------PCP: Bactrim ppx ---------HSV and VZV: acyclovir 800mg PO BID  ---------Fungal: posaconazole 300mg PO daily until day +75. Discontinued. ---------CMV: letermovir 480 mg PO daily ---------SOS prophylaxis: Ursodiol 300 mg BID (until day 30-60). Discontinued 1/7/25. ---------If chronic GvHD present, encapsulated organism coverage: N/A  Survivorship: - 25-hydroxyclaciferol (day +80): sent on 2/19/25 (20.8).  - TSH (annually): - Ferritin (annually): - Fertility (annual, x2 if applicable): - Bone mineral densitometry (day +365 for women, men exposed to prolonged steroids and CNI): - Vaccinations per SOP starting at day 180+ except for Flu at day + 90 and COVID per CDC guidelines.    Hypotension (RESOLVED) Increase fluid intake, maintain 2-3L/day  Physical Therapy Referral for STARS Transplant cellular therapy ordered on 2/12/25.  Patient will not attend as he is walking one mile a day and feels great.   Continue post transplant diet and crowd restrictions. Questions and concerns addressed. Reviewed signs and symptoms to report to clinic; patient has clinic and after hours number  RTC 3/12 labs, provider visit  Linette Houston NP [facility] Adult Transplantation and Cellular Therapy Program   I saw the patient on day +113 following his allogeneic HSCT.  I reviewed the data and the above note.   Diagnosis: AML Disease staging at transplant: CR Conditioning regimen: LIV Bu Flu rATG GvHD prophylaxis: ABC Donor type: MUD  Stem cell source: Peripheral ABO  Recipient: A positive  Donor: O positive  Incompatibility: Minor CMV Status  Recipient: Positive  Donor: Negative Toxoplasmosis Status  Recipient: Negative  Donor: Negative  The patient is feeling better.  His energy and intake are improved. He has no complaints. His appetite is excellent and he has been walking up to 1 mile a day.  He has engrafted with full donor chimerism. His counts are adequate.  He has no aGvHD.  He has no infectious complications other than EBV reactivation. He will get his 4th dose of rituximab. His viral load has decreased. His repeat viral load from today is pending. He is on appropriate prophylaxis.  His BM studies are negative including his Onkosite (r) with no p53 mutation detected.  He has no other issues. He understands his current status and precautions.  He will return to clinic in 1 week.  I spent a total of 30 minutes performing the above tasks. MARISABEL Mena MD

## 2025-03-06 NOTE — PHYSICAL EXAM
[Restricted in physically strenuous activity but ambulatory and able to carry out work of a light or sedentary nature] : Status 1- Restricted in physically strenuous activity but ambulatory and able to carry out work of a light or sedentary nature, e.g., light house work, office work [Normal] : affect appropriate [de-identified] : Dry Skin

## 2025-03-06 NOTE — HISTORY OF PRESENT ILLNESS
[Research Protocol] : Research Protocol  [de-identified] : 71 year old s/p HSCT for AML    Status post allogeneic transplant, day +113 Transplant physician: Dr. Bay  Referring physician:  Diagnosis: AML  Disease staging at transplant: CR Conditioning regimen: LIV BuFluATG GvHD prophylaxis: PTCyBorAba (ABC) on study  Donor type: MUD                    Mismatches: N/A, 10/10                    Stem cell source: Peripheral  ABO         Recipient: A positive         Donor: O positive          Incompatibility: Minor  CMV Status  Recipient: Positive  Donor: Negative Toxoplasmosis Status  Recipient: Negative   Donor: Negative    PMH:  BPH, AML     -----------------Oncologic Hx:-------------------------------- He had been well, exercising regularly, three miles/day. By early 7/2024, he had developed NGUYEN on climbing stairs. Pancytopenia with circulating blasts were detected and he was admitted to [facility] 7/18/2024. 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 He was treated with decitabine and venetoclax x 3 cycles.    -----------Transplant hospital course:---------------------  Tolerated well, c/b increased BP due to post transplant cytoxan fluids. Additionally c/b hematuria, interstitial mucositis.  -----Day 0 = 11/12/24  --------------Post Transplant Course:-----------------------   -------------Oncological treatments:-------------------------- Decitabine and venetoclax 100mg 21d   ---------------Bone Marrow Bx Results:---------------------- Bone Marrow Biopsy, Clot and Bone Marrow Aspirate, Right PIC -MDS/AML with mutated TP53 ( per International Consensus  Classification of Myeloid Neoplasms and Acute Leukemias) -MDS with biallelic TP53 inactivation (WHO-VIGR9fr ed). Hypercellular marrow for age with multilineage dysplasia, and increased blasts/immature cells (12% on aspirate differential count, 10-15% by CD34 IHC stain). -Abnormal Karyotype 46-47,XY,del(7)(q11.2),+8,del(8)(q13q22)x2,del(9)(q13q22),-11,-12, add(17)(p11.2),del(20)(q11.2q13.3),+1-2mar             {cp13                                       }/46,XY             {7                                       } -FISH studies detected 7q deletion (60%), Trisomy 8 (62.5%), 20q deletion (59.5%) and TP53 deletion (55.5%). -[employer] Advanced NGS Myeloid Report detected (Tier I) TP53 p.[id number] (VAF38%) and U2AF1 p.Okb231Yzj (VAF32%). There are increased blasts/immature cells (12% on aspirate differential count and approximately 10-15% of the cellularity by CD34 immunohistochemical stain).  8/15/24:  Bone marrow biopsy and bone marrow aspirate  - Cellular marrow (30-40%) with markedly decreased myelopoiesis, decreased left shifted erythropoiesis and relatively normal megakaryopoiesis with no increase in blast population  - Persistent 7q deletion (1%), trisomy 8 (2.5%), 20q deletion (2.5%) and TP53 deletion (0.5%) by FISH studies, below the cut-off values  _ Persistent mutations in TP53 p.[id number] and U2AF1 p.Joq563Kdb at low VAF levels  10/15/24 (Pretransplant BmBx) ----Morphology: Normocellular bone marrow with trilineage regeneration (focal myeloid predominant, focal erythroid predominant, overall decreased megakaryocytes with focally normal numbers and normal morphology, and increased iron stores (history of MDS/AML with complex karyotype and TP53 deletion, under treatment)      - No morphologic or immunophenotypic findings of bone marrow involvement by leukemia are identified ----Karyotype: Normal ----FISH: deletion of TP53 below cut off value  ----NGS: Normal    - Day 30 post transplant: completed 12/12/24  ----Morphology:SANJEEV ----Karyotype: //46,XX[20] ----FISH: Normal FISH ----NGS: [employer] Advanced NGS Myeloid Panel is normal. No mutations identified.    - Day 100 post transplant: completed 2/19/25 ----Morphology: Trilineage hematopoiesis with maturation and increased iron stores. No morphologic or immunophenotypic features of bone marrow involvement by MDS/AML are identified. ----Karyotype:46,XX[20] ----FISH: Normal Fish ----NGS:[employer] NGS panel- no mutations identified.   - Day 180 post transplant: Due 5/12/25 ----Morphology: ----Karyotype ----FISH ----NGS:   ---------Past Surgical Hx---------- Partial mastoidectomy and requires periodic left mastoid debridement  -------- -Social Hx ----------------- The patient is  and has 2 daughters, 30 and 29y/o local in [place]  Patient has one brother (69)  Work: Nephrologist  Born in: [place]  Never a smoker/Social alcohol use  ----------Past Family Hx:------------ Noncontributory  [FreeTextEntry1] : Phase I-II Study of High-Dose Post-Transplant Cyclophosphamide, Bortezomib, and Abatacept for the Prevention of Boowz-ubnxgu-Xlut Disease (GvHD) following Allogeneic Hematopoietic Stem Cell Transplantation (HSCT) [de-identified] : 3/5/25: Day + 113 post-transplant. Overall well and offers no acute concerns. Walks one mile a day.  Denies  fever, vomiting, diarrhea, rash, mouth sores, nausea or any signs of active bleeding. Denies SOB or B/L LE edema. Taking all medications as prescribed.

## 2025-03-06 NOTE — HISTORY OF PRESENT ILLNESS
[Research Protocol] : Research Protocol  [de-identified] : 71 year old s/p HSCT for AML    Status post allogeneic transplant, day +113 Transplant physician: Dr. Bay  Referring physician:  Diagnosis: AML  Disease staging at transplant: CR Conditioning regimen: LIV BuFluATG GvHD prophylaxis: PTCyBorAba (ABC) on study  Donor type: MUD                    Mismatches: N/A, 10/10                    Stem cell source: Peripheral  ABO         Recipient: A positive         Donor: O positive          Incompatibility: Minor  CMV Status  Recipient: Positive  Donor: Negative Toxoplasmosis Status  Recipient: Negative   Donor: Negative    PMH:  BPH, AML     -----------------Oncologic Hx:-------------------------------- He had been well, exercising regularly, three miles/day. By early 7/2024, he had developed NGUYEN on climbing stairs. Pancytopenia with circulating blasts were detected and he was admitted to [facility] 7/18/2024. 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 He was treated with decitabine and venetoclax x 3 cycles.    -----------Transplant hospital course:---------------------  Tolerated well, c/b increased BP due to post transplant cytoxan fluids. Additionally c/b hematuria, interstitial mucositis.  -----Day 0 = 11/12/24  --------------Post Transplant Course:-----------------------   -------------Oncological treatments:-------------------------- Decitabine and venetoclax 100mg 21d   ---------------Bone Marrow Bx Results:---------------------- Bone Marrow Biopsy, Clot and Bone Marrow Aspirate, Right PIC -MDS/AML with mutated TP53 ( per International Consensus  Classification of Myeloid Neoplasms and Acute Leukemias) -MDS with biallelic TP53 inactivation (WHO-YSQN9oa ed). Hypercellular marrow for age with multilineage dysplasia, and increased blasts/immature cells (12% on aspirate differential count, 10-15% by CD34 IHC stain). -Abnormal Karyotype 46-47,XY,del(7)(q11.2),+8,del(8)(q13q22)x2,del(9)(q13q22),-11,-12, add(17)(p11.2),del(20)(q11.2q13.3),+1-2mar             {cp13                                       }/46,XY             {7                                       } -FISH studies detected 7q deletion (60%), Trisomy 8 (62.5%), 20q deletion (59.5%) and TP53 deletion (55.5%). -OnkoSit Advanced NGS Myeloid Report detected (Tier I) TP53 p.Wwy996Zff (VAF38%) and U2AF1 p.Gma923Qse (VAF32%). There are increased blasts/immature cells (12% on aspirate differential count and approximately 10-15% of the cellularity by CD34 immunohistochemical stain).  8/15/24:  Bone marrow biopsy and bone marrow aspirate  - Cellular marrow (30-40%) with markedly decreased myelopoiesis, decreased left shifted erythropoiesis and relatively normal megakaryopoiesis with no increase in blast population  - Persistent 7q deletion (1%), trisomy 8 (2.5%), 20q deletion (2.5%) and TP53 deletion (0.5%) by FISH studies, below the cut-off values  _ Persistent mutations in TP53 p.Ynr012Sxo and U2AF1 p.Rlp982Shj at low VAF levels  10/15/24 (Pretransplant BmBx) ----Morphology: Normocellular bone marrow with trilineage regeneration (focal myeloid predominant, focal erythroid predominant, overall decreased megakaryocytes with focally normal numbers and normal morphology, and increased iron stores (history of MDS/AML with complex karyotype and TP53 deletion, under treatment)      - No morphologic or immunophenotypic findings of bone marrow involvement by leukemia are identified ----Karyotype: Normal ----FISH: deletion of TP53 below cut off value  ----NGS: Normal    - Day 30 post transplant: completed 12/12/24  ----Morphology:SANJEEV ----Karyotype: //46,XX[20] ----FISH: Normal FISH ----NGS: [employer] Advanced NGS Myeloid Panel is normal. No mutations identified.    - Day 100 post transplant: completed 2/19/25 ----Morphology: Trilineage hematopoiesis with maturation and increased iron stores. No morphologic or immunophenotypic features of bone marrow involvement by MDS/AML are identified. ----Karyotype:46,XX[20] ----FISH: Normal Fish ----NGS:[employer] NGS panel- no mutations identified.   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 Overall well and offers no acute concerns. Walks one mile a day.  Denies  fever, vomiting, diarrhea, rash, mouth sores, nausea or any signs of active bleeding. Denies SOB or B/L LE edema. Taking all medications as prescribed.

## 2025-03-06 NOTE — PHYSICAL EXAM
[Restricted in physically strenuous activity but ambulatory and able to carry out work of a light or sedentary nature] : Status 1- Restricted in physically strenuous activity but ambulatory and able to carry out work of a light or sedentary nature, e.g., light house work, office work [Normal] : affect appropriate [de-identified] : Dry Skin

## 2025-03-06 NOTE — REVIEW OF SYSTEMS
[Fatigue] : fatigue [FreeTextEntry6] : Nonproductive cough, improved [Negative] : Respiratory [Fever] : no fever [Chills] : no chills [Night Sweats] : no night sweats [Recent Change In Weight] : ~T no recent weight change [Vision Problems] : no vision problems [Nosebleeds] : no nosebleeds [Shortness Of Breath] : no shortness of breath [Wheezing] : no wheezing [Cough] : no cough [SOB on Exertion] : no shortness of breath during exertion [Vomiting] : no vomiting [Constipation] : no constipation [Dysuria] : no dysuria [de-identified] : Dry Skin

## 2025-03-06 NOTE — ASSESSMENT
[FreeTextEntry1] : 72 yo with pmhx of AML, s/p HSCT transplant on 11/12/24. Today is day +113.   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 ----------Post transplant maintenance therapy: N/A ------------------Indication: N/A ----------DLI given: N/A Disease monitoring: --> Post transplant bone marrow biopsy on Day +30 (completed 12/12/24), Day+ 100 (2/19/25), and Day +180 (5/12/25).   Engraftment: ------Chimerism: 11/26/24 chimerism is greater than 97% chimerism, recipient not detected. ------ANC engraftment date: 11/26/24 ------Platelets engraftment date: 11/29/24, last received platelets on 11/22/24.  ------ABO Rh incompatibility issues:  -->Plan: ------G-CSF support: no ------Transfusions requirements: No ----------------Transfuse if platelets <10K or actively bleeding per SOP. ------Chimerism: monitor every other week and on marrow until d+180, last performed 2/19/25 (97% donor, recipient not detected). Pending 3/5/25 result.  ------Immune reconstitution due on days +100, + 180, + 365   GvHD: -------Acute: No -------Chronic: N/A -------Steroid Taper: N/A -------ECP: N/A -->Plan: ------Reviewed GvHD signs and symptoms to report ------Prophylaxis with ABC regimen. -----------PTcy -----------Abatacept IV 10mg/kg on days +28 (12/10)    ID: ---Monitoring: Send CMV PCR, Adenovirus and EBV weekly until day +180 ----------CMV: negative to date, pending today's result ----------EBV: Positive on 1/29/25= 591. 2/4/25 EBV PCR 49,500 copies. Treated with Ruxience 2/5/25 at [facility]. Received Ruxience on 2/12/25, EBV downtrending to 5630, Received last dose of Ruxience on 2/25/2025, EBV downtrending to 457. 3/5/25 result less than 35. Continue to monitor.  ---------Adeno: negative to date, pending today's result --->Continue ppx: ---------PCP: Bactrim ppx ---------HSV and VZV: acyclovir 800mg PO BID  ---------Fungal: posaconazole 300mg PO daily until day +75. Discontinued. ---------CMV: letermovir 480 mg PO daily ---------SOS prophylaxis: Ursodiol 300 mg BID (until day 30-60). Discontinued 1/7/25. ---------If chronic GvHD present, encapsulated organism coverage: N/A  Survivorship: - 25-hydroxyclaciferol (day +80): sent on 2/19/25 (20.8).  - TSH (annually): - Ferritin (annually): - Fertility (annual, x2 if applicable): - Bone mineral densitometry (day +365 for women, men exposed to prolonged steroids and CNI): - Vaccinations per SOP starting at day 180+ except for Flu at day + 90 and COVID per CDC guidelines.    Hypotension (RESOLVED) Increase fluid intake, maintain 2-3L/day  Physical Therapy Referral for [facility] Transplant cellular therapy ordered on 2/12/25.  Patient will not attend as he is walking one mile a day and feels great.   Continue post transplant diet and crowd restrictions. Questions and concerns addressed. Reviewed signs and symptoms to report to clinic; patient has clinic and after hours number  RTC 3/12 labs, provider visit  Linette Houston NP [facility] Adult Transplantation and Cellular Therapy Program   I saw the patient on day +113 following his allogeneic HSCT.  I reviewed the data and the above note.   Diagnosis: AML Disease staging at transplant: CR Conditioning regimen: LIV Bu Flu rATG GvHD prophylaxis: ABC Donor type: MUD  Stem cell source: Peripheral ABO  Recipient: A positive  Donor: O positive  Incompatibility: Minor CMV Status  Recipient: Positive  Donor: Negative Toxoplasmosis Status  Recipient: Negative  Donor: Negative  The patient is feeling better.  His energy and intake are improved. He has no complaints. His appetite is excellent and he has been walking up to 1 mile a day.  He has engrafted with full donor chimerism. His counts are adequate.  He has no aGvHD.  He has no infectious complications other than EBV reactivation. He will get his 4th dose of rituximab. His viral load has decreased. His repeat viral load from today is pending. He is on appropriate prophylaxis.  His BM studies are negative including his Onkosite (r) with no p53 mutation detected.  He has no other issues. He understands his current status and precautions.  He will return to clinic in 1 week.  I spent a total of 30 minutes performing the above tasks. MARISABEL Mena MD

## 2025-03-13 NOTE — ASSESSMENT
[FreeTextEntry1] : 70 yo with pmhx of AML, s/p HSCT transplant on 11/12/24. Today is day +119.   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 ----------Post transplant maintenance therapy: N/A ------------------Indication: N/A ----------DLI given: N/A Disease monitoring: --> Post transplant bone marrow biopsy on Day +30 (completed 12/12/24), Day+ 100 (2/19/25), and Day +180 (5/12/25).   Engraftment: ------Chimerism: 11/26/24 chimerism is greater than 97% chimerism, recipient not detected. ------ANC engraftment date: 11/26/24 ------Platelets engraftment date: 11/29/24, last received platelets on 11/22/24.  ------ABO Rh incompatibility issues:  -->Plan: ------G-CSF support: no ------Transfusions requirements: No ----------------Transfuse if platelets <10K or actively bleeding per SOP. ------Chimerism: monitor every other week and on marrow until d+180, last performed 2/19/25 (97% donor, recipient not detected). Pending 3/5/25 result.  ------Immune reconstitution due on days +100, + 180, + 365   GvHD: -------Acute: No -------Chronic: N/A -------Steroid Taper: N/A -------ECP: N/A -->Plan: ------Reviewed GvHD signs and symptoms to report ------Prophylaxis with ABC regimen. -----------PTcy -----------Abatacept IV 10mg/kg on days +28 (12/10)    ID: ---Monitoring: Send CMV PCR, Adenovirus and EBV weekly until day +180 ----------CMV: negative to date, pending today's result ----------EBV: Positive on 1/29/25= 591. 2/4/25 EBV PCR 49,500 copies. Treated with Ruxience 2/5/25 at [facility]. Received Ruxience on 2/12/25, EBV downtrending to 5630, Received last dose of Ruxience on 2/25/2025, EBV downtrending to 457. 3/5/25 result less than 35. Continue to monitor.  ---------Adeno: negative to date, pending today's result --->Continue ppx: ---------PCP: Bactrim ppx ---------HSV and VZV: acyclovir 800mg PO BID  ---------Fungal: posaconazole 300mg PO daily until day +75. Discontinued. ---------CMV: letermovir 480 mg PO daily ---------SOS prophylaxis: Ursodiol 300 mg BID (until day 30-60). Discontinued 1/7/25. ---------If chronic GvHD present, encapsulated organism coverage: N/A    Survivorship: - 25-hydroxyclaciferol (day +80): sent on 2/19/25 (20.8).  - TSH (annually): - Ferritin (annually): - Fertility (annual, x2 if applicable): - Bone mineral densitometry (day +365 for women, men exposed to prolonged steroids and CNI): - Vaccinations per SOP starting at day 180+ except for Flu at day + 90 and COVID per CDC guidelines.  Hypotension (RESOLVED) Increase fluid intake, maintain 2-3L/day  LDH ---LDH uptrending 243-->418 - continue to monitor.   Physical Therapy Referral for STARS Transplant cellular therapy ordered on 2/12/25.  Patient will not attend as he is walking one mile a day and feels great.     Continue post transplant diet and crowd restrictions. Questions and concerns addressed. Reviewed signs and symptoms to report to clinic; patient has clinic and after hours number  RTC 3/12 labs, provider visit  Aracelis Heller NP [facility] Adult Transplantation and Cellular Therapy Program   I saw the patient on day +119 following his allogeneic HSCT.  I reviewed the data and the above note.   Diagnosis: AML Disease staging at transplant: CR Conditioning regimen: LIV Bu Flu rATG GvHD prophylaxis: ABC Donor type: MUD  Stem cell source: Peripheral ABO  Recipient: A positive  Donor: O positive  Incompatibility: Minor CMV Status  Recipient: Positive  Donor: Negative Toxoplasmosis Status  Recipient: Negative  Donor: Negative  The patient is feeling better.  His energy and intake are improved. He has no complaints.  He has no LAP and no HSM.  He has engrafted with full donor chimerism. His counts are adequate.  He has no aGvHD.  He has no infectious complications other than EBV reactivation. He will get his 4th dose of rituximab. His viral load has decreased. His repeat viral load from today is pending. He is on appropriate prophylaxis.  His BM studies are negative including his Onkosite (r) with no p53 mutation detected.  He has no other issues. His LDH was noted. We will repeat next week.  He understands his current status and precautions.  He will return to clinic in 1 week.  I spent a total of 30 minutes performing the above tasks. MARISABEL Mena MD

## 2025-03-13 NOTE — REVIEW OF SYSTEMS
[Fatigue] : fatigue [Negative] : Heme/Lymph [Cough] : cough [Fever] : no fever [Chills] : no chills [Night Sweats] : no night sweats [Recent Change In Weight] : ~T no recent weight change [Vision Problems] : no vision problems [Nosebleeds] : no nosebleeds [Shortness Of Breath] : no shortness of breath [Wheezing] : no wheezing [SOB on Exertion] : no shortness of breath during exertion [Vomiting] : no vomiting [Constipation] : no constipation [Dysuria] : no dysuria [FreeTextEntry6] : Non-productive cough [de-identified] : Dry Skin

## 2025-03-13 NOTE — REVIEW OF SYSTEMS
[Fatigue] : fatigue [Negative] : Heme/Lymph [Cough] : cough [Fever] : no fever [Chills] : no chills [Night Sweats] : no night sweats [Recent Change In Weight] : ~T no recent weight change [Vision Problems] : no vision problems [Nosebleeds] : no nosebleeds [Shortness Of Breath] : no shortness of breath [Wheezing] : no wheezing [SOB on Exertion] : no shortness of breath during exertion [Vomiting] : no vomiting [Constipation] : no constipation [Dysuria] : no dysuria [FreeTextEntry6] : Non-productive cough [de-identified] : Dry Skin

## 2025-03-13 NOTE — PHYSICAL EXAM
[Restricted in physically strenuous activity but ambulatory and able to carry out work of a light or sedentary nature] : Status 1- Restricted in physically strenuous activity but ambulatory and able to carry out work of a light or sedentary nature, e.g., light house work, office work [Normal] : affect appropriate [de-identified] : Dry Skin

## 2025-03-13 NOTE — ASSESSMENT
[FreeTextEntry1] : 72 yo with pmhx of AML, s/p HSCT transplant on 11/12/24. Today is day +119.   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 ----------Post transplant maintenance therapy: N/A ------------------Indication: N/A ----------DLI given: N/A Disease monitoring: --> Post transplant bone marrow biopsy on Day +30 (completed 12/12/24), Day+ 100 (2/19/25), and Day +180 (5/12/25).   Engraftment: ------Chimerism: 11/26/24 chimerism is greater than 97% chimerism, recipient not detected. ------ANC engraftment date: 11/26/24 ------Platelets engraftment date: 11/29/24, last received platelets on 11/22/24.  ------ABO Rh incompatibility issues:  -->Plan: ------G-CSF support: no ------Transfusions requirements: No ----------------Transfuse if platelets <10K or actively bleeding per SOP. ------Chimerism: monitor every other week and on marrow until d+180, last performed 2/19/25 (97% donor, recipient not detected). Pending 3/5/25 result.  ------Immune reconstitution due on days +100, + 180, + 365   GvHD: -------Acute: No -------Chronic: N/A -------Steroid Taper: N/A -------ECP: N/A -->Plan: ------Reviewed GvHD signs and symptoms to report ------Prophylaxis with ABC regimen. -----------PTcy -----------Abatacept IV 10mg/kg on days +28 (12/10)    ID: ---Monitoring: Send CMV PCR, Adenovirus and EBV weekly until day +180 ----------CMV: negative to date, pending today's result ----------EBV: Positive on 1/29/25= 591. 2/4/25 EBV PCR 49,500 copies. Treated with Ruxience 2/5/25 at [facility]. Received Ruxience on 2/12/25, EBV downtrending to 5630, Received last dose of Ruxience on 2/25/2025, EBV downtrending to 457. 3/5/25 result less than 35. Continue to monitor.  ---------Adeno: negative to date, pending today's result --->Continue ppx: ---------PCP: Bactrim ppx ---------HSV and VZV: acyclovir 800mg PO BID  ---------Fungal: posaconazole 300mg PO daily until day +75. Discontinued. ---------CMV: letermovir 480 mg PO daily ---------SOS prophylaxis: Ursodiol 300 mg BID (until day 30-60). Discontinued 1/7/25. ---------If chronic GvHD present, encapsulated organism coverage: N/A    Survivorship: - 25-hydroxyclaciferol (day +80): sent on 2/19/25 (20.8).  - TSH (annually): - Ferritin (annually): - Fertility (annual, x2 if applicable): - Bone mineral densitometry (day +365 for women, men exposed to prolonged steroids and CNI): - Vaccinations per SOP starting at day 180+ except for Flu at day + 90 and COVID per CDC guidelines.  Hypotension (RESOLVED) Increase fluid intake, maintain 2-3L/day  LDH ---LDH uptrending 243-->418 - continue to monitor.   Physical Therapy Referral for STARS Transplant cellular therapy ordered on 2/12/25.  Patient will not attend as he is walking one mile a day and feels great.     Continue post transplant diet and crowd restrictions. Questions and concerns addressed. Reviewed signs and symptoms to report to clinic; patient has clinic and after hours number  RTC 3/12 labs, provider visit  Aracelis Heller NP [facility] Adult Transplantation and Cellular Therapy Program   I saw the patient on day +119 following his allogeneic HSCT.  I reviewed the data and the above note.   Diagnosis: AML Disease staging at transplant: CR Conditioning regimen: LIV Bu Flu rATG GvHD prophylaxis: ABC Donor type: MUD  Stem cell source: Peripheral ABO  Recipient: A positive  Donor: O positive  Incompatibility: Minor CMV Status  Recipient: Positive  Donor: Negative Toxoplasmosis Status  Recipient: Negative  Donor: Negative  The patient is feeling better.  His energy and intake are improved. He has no complaints.  He has no LAP and no HSM.  He has engrafted with full donor chimerism. His counts are adequate.  He has no aGvHD.  He has no infectious complications other than EBV reactivation. He will get his 4th dose of rituximab. His viral load has decreased. His repeat viral load from today is pending. He is on appropriate prophylaxis.  His BM studies are negative including his Onkosite (r) with no p53 mutation detected.  He has no other issues. His LDH was noted. We will repeat next week.  He understands his current status and precautions.  He will return to clinic in 1 week.  I spent a total of 30 minutes performing the above tasks. MARISABEL Mena MD

## 2025-03-13 NOTE — PHYSICAL EXAM
[Restricted in physically strenuous activity but ambulatory and able to carry out work of a light or sedentary nature] : Status 1- Restricted in physically strenuous activity but ambulatory and able to carry out work of a light or sedentary nature, e.g., light house work, office work [Normal] : affect appropriate [de-identified] : Dry Skin

## 2025-03-13 NOTE — HISTORY OF PRESENT ILLNESS
[Research Protocol] : Research Protocol  [de-identified] : 71 year old s/p HSCT for AML    Status post allogeneic transplant, day +119 Transplant physician: Dr. Bay  Referring physician:  Diagnosis: AML  Disease staging at transplant: CR Conditioning regimen: LIV BuFluATG GvHD prophylaxis: PTCyBorAba (ABC) on study  Donor type: MUD                    Mismatches: N/A, 10/10                    Stem cell source: Peripheral  ABO         Recipient: A positive         Donor: O positive          Incompatibility: Minor  CMV Status  Recipient: Positive  Donor: Negative Toxoplasmosis Status  Recipient: Negative   Donor: Negative    PMH:  BPH, AML     -----------------Oncologic Hx:-------------------------------- He had been well, exercising regularly, three miles/day. By early 7/2024, he had developed NGUYEN on climbing stairs. Pancytopenia with circulating blasts were detected and he was admitted to [facility] 7/18/2024. 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 He was treated with decitabine and venetoclax x 3 cycles.    -----------Transplant hospital course:---------------------  Tolerated well, c/b increased BP due to post transplant cytoxan fluids. Additionally c/b hematuria, interstitial mucositis.  -----Day 0 = 11/12/24  --------------Post Transplant Course:-----------------------   -------------Oncological treatments:-------------------------- Decitabine and venetoclax 100mg 21d   ---------------Bone Marrow Bx Results:---------------------- Bone Marrow Biopsy, Clot and Bone Marrow Aspirate, Right PIC -MDS/AML with mutated TP53 ( per International Consensus  Classification of Myeloid Neoplasms and Acute Leukemias) -MDS with biallelic TP53 inactivation (WHO-ZMWB1hg ed). Hypercellular marrow for age with multilineage dysplasia, and increased blasts/immature cells (12% on aspirate differential count, 10-15% by CD34 IHC stain). -Abnormal Karyotype 46-47,XY,del(7)(q11.2),+8,del(8)(q13q22)x2,del(9)(q13q22),-11,-12, add(17)(p11.2),del(20)(q11.2q13.3),+1-2mar               {cp13                                           \}/46,XY               {7                                           \} -FISH studies detected 7q deletion (60%), Trisomy 8 (62.5%), 20q deletion (59.5%) and TP53 deletion (55.5%). -[employer] Advanced NGS Myeloid Report detected (Tier I) TP53 p.Wxv639Nte (VAF38%) and U2AF1 p.Hew039Gau (VAF32%). There are increased blasts/immature cells (12% on aspirate differential count and approximately 10-15% of the cellularity by CD34 immunohistochemical stain).  8/15/24:  Bone marrow biopsy and bone marrow aspirate  - Cellular marrow (30-40%) with markedly decreased myelopoiesis, decreased left shifted erythropoiesis and relatively normal megakaryopoiesis with no increase in blast population  - Persistent 7q deletion (1%), trisomy 8 (2.5%), 20q deletion (2.5%) and TP53 deletion (0.5%) by FISH studies, below the cut-off values  _ Persistent mutations in TP53 p.Bji351Xxw and U2AF1 p.Wof606Mjn at low VAF levels  10/15/24 (Pretransplant BmBx) ----Morphology: Normocellular bone marrow with trilineage regeneration (focal myeloid predominant, focal erythroid predominant, overall decreased megakaryocytes with focally normal numbers and normal morphology, and increased iron stores (history of MDS/AML with complex karyotype and TP53 deletion, under treatment)      - No morphologic or immunophenotypic findings of bone marrow involvement by leukemia are identified ----Karyotype: Normal ----FISH: deletion of TP53 below cut off value  ----NGS: Normal    - Day 30 post transplant: completed 12/12/24  ----Morphology:SANJEEV ----Karyotype: //46,XX[20] ----FISH: Normal FISH ----NGS: [employer] Advanced NGS Myeloid Panel is normal. No mutations identified.    - Day 100 post transplant: completed 2/19/25 ----Morphology: Trilineage hematopoiesis with maturation and increased iron stores. No morphologic or immunophenotypic features of bone marrow involvement by MDS/AML are identified. ----Karyotype:46,XX[20] ----FISH: Normal Fish ----NGS:[employer] NGS panel- no mutations identified.   - Day 180 post transplant: Due 5/12/25 ----Morphology: ----Karyotype ----FISH ----NGS:   ---------Past Surgical Hx---------- Partial mastoidectomy and requires periodic left mastoid debridement  -------- -Social Hx ----------------- The patient is  and has 2 daughters, 30 and 27y/o local in [place]  Patient has one brother (69)  Work: Nephrologist  Born in: [place]  Never a smoker/Social alcohol use  ----------Past Family Hx:------------ Noncontributory  [FreeTextEntry1] : Phase I-II Study of High-Dose Post-Transplant Cyclophosphamide, Bortezomib, and Abatacept for the Prevention of Rrrms-zpivuu-Elix Disease (GvHD) following Allogeneic Hematopoietic Stem Cell Transplantation (HSCT) [de-identified] : 3/12/25: Day + 119 post-transplant. Patient reports having a mild non-productive cough, reports due to hx sinusitis and allergies. Lungs sounds clear, denies tenderness on frontal, or maxillary area. Reports to have good appetite. Wt has be improving. Continues to drink 2L of fluids per day. Reports1-2 BM loose/soft stools. Denies diarrhea. Otherwise, no fever, vomiting, diarrhea, rash, mouth sores, nausea or any signs of active bleeding. Denies SOB or B/L LE edema. Taking all medications as prescribed.

## 2025-03-13 NOTE — HISTORY OF PRESENT ILLNESS
[Research Protocol] : Research Protocol  [de-identified] : 71 year old s/p HSCT for AML    Status post allogeneic transplant, day +119 Transplant physician: Dr. Bay  Referring physician:  Diagnosis: AML  Disease staging at transplant: CR Conditioning regimen: LIV BuFluATG GvHD prophylaxis: PTCyBorAba (ABC) on study  Donor type: MUD                    Mismatches: N/A, 10/10                    Stem cell source: Peripheral  ABO         Recipient: A positive         Donor: O positive          Incompatibility: Minor  CMV Status  Recipient: Positive  Donor: Negative Toxoplasmosis Status  Recipient: Negative   Donor: Negative    PMH:  BPH, AML     -----------------Oncologic Hx:-------------------------------- He had been well, exercising regularly, three miles/day. By early 7/2024, he had developed NGUYEN on climbing stairs. Pancytopenia with circulating blasts were detected and he was admitted to [facility] 7/18/2024. 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 He was treated with decitabine and venetoclax x 3 cycles.    -----------Transplant hospital course:---------------------  Tolerated well, c/b increased BP due to post transplant cytoxan fluids. Additionally c/b hematuria, interstitial mucositis.  -----Day 0 = 11/12/24  --------------Post Transplant Course:-----------------------   -------------Oncological treatments:-------------------------- Decitabine and venetoclax 100mg 21d   ---------------Bone Marrow Bx Results:---------------------- Bone Marrow Biopsy, Clot and Bone Marrow Aspirate, Right PIC -MDS/AML with mutated TP53 ( per International Consensus  Classification of Myeloid Neoplasms and Acute Leukemias) -MDS with biallelic TP53 inactivation (WHO-HYPQ9es ed). Hypercellular marrow for age with multilineage dysplasia, and increased blasts/immature cells (12% on aspirate differential count, 10-15% by CD34 IHC stain). -Abnormal Karyotype 46-47,XY,del(7)(q11.2),+8,del(8)(q13q22)x2,del(9)(q13q22),-11,-12, add(17)(p11.2),del(20)(q11.2q13.3),+1-2mar               {cp13                                           \}/46,XY               {7                                           \} -FISH studies detected 7q deletion (60%), Trisomy 8 (62.5%), 20q deletion (59.5%) and TP53 deletion (55.5%). -Onko[employer] Advanced NGS Myeloid Report detected (Tier I) TP53 p.Rbt211Nzn (VAF38%) and U2AF1 p.Rms452Oul (VAF32%). There are increased blasts/immature cells (12% on aspirate differential count and approximately 10-15% of the cellularity by CD34 immunohistochemical stain).  8/15/24:  Bone marrow biopsy and bone marrow aspirate  - Cellular marrow (30-40%) with markedly decreased myelopoiesis, decreased left shifted erythropoiesis and relatively normal megakaryopoiesis with no increase in blast population  - Persistent 7q deletion (1%), trisomy 8 (2.5%), 20q deletion (2.5%) and TP53 deletion (0.5%) by FISH studies, below the cut-off values  _ Persistent mutations in TP53 p.Juz502Qcx and U2AF1 p.Qmb687Wik at low VAF levels  10/15/24 (Pretransplant BmBx) ----Morphology: Normocellular bone marrow with trilineage regeneration (focal myeloid predominant, focal erythroid predominant, overall decreased megakaryocytes with focally normal numbers and normal morphology, and increased iron stores (history of MDS/AML with complex karyotype and TP53 deletion, under treatment)      - No morphologic or immunophenotypic findings of bone marrow involvement by leukemia are identified ----Karyotype: Normal ----FISH: deletion of TP53 below cut off value  ----NGS: Normal    - Day 30 post transplant: completed 12/12/24  ----Morphology:SANJEEV ----Karyotype: //46,XX[20] ----FISH: Normal FISH ----NGS: [employer] Advanced NGS Myeloid Panel is normal. No mutations identified.    - Day 100 post transplant: completed 2/19/25 ----Morphology: Trilineage hematopoiesis with maturation and increased iron stores. No morphologic or immunophenotypic features of bone marrow involvement by MDS/AML are identified. ----Karyotype:46,XX[20] ----FISH: Normal Fish ----NGS:[employer] NGS panel- no mutations identified.   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 Patient reports having a mild non-productive cough, reports due to hx sinusitis and allergies. Lungs sounds clear, denies tenderness on frontal, or maxillary area. Reports to have good appetite. Wt has be improving. Continues to drink 2L of fluids per day. Reports1-2 BM loose/soft stools. Denies diarrhea. Otherwise, no fever, vomiting, diarrhea, rash, mouth sores, nausea or any signs of active bleeding. Denies SOB or B/L LE edema. Taking all medications as prescribed.

## 2025-03-19 NOTE — REVIEW OF SYSTEMS
[Fatigue] : fatigue [Cough] : cough [Negative] : Heme/Lymph [Fever] : no fever [Chills] : no chills [Night Sweats] : no night sweats [Recent Change In Weight] : ~T no recent weight change [Vision Problems] : no vision problems [Nosebleeds] : no nosebleeds [Shortness Of Breath] : no shortness of breath [Wheezing] : no wheezing [SOB on Exertion] : no shortness of breath during exertion [Vomiting] : no vomiting [Constipation] : no constipation [Dysuria] : no dysuria [FreeTextEntry6] : Non-productive cough [de-identified] : Dry Skin

## 2025-03-19 NOTE — ASSESSMENT
[FreeTextEntry1] : 70 yo with pmhx of AML, s/p HSCT transplant on 11/12/24. Today is day +127.   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 ----------Post transplant maintenance therapy: N/A ------------------Indication: N/A ----------DLI given: N/A Disease monitoring: --> Post transplant bone marrow biopsy on Day +30 (completed 12/12/24), Day+ 100 (2/19/25), and Day +180 (5/12/25).   Engraftment: ------Chimerism: 11/26/24 chimerism is greater than 97% chimerism, recipient not detected. ------ANC engraftment date: 11/26/24 ------Platelets engraftment date: 11/29/24, last received platelets on 11/22/24.  ------ABO Rh incompatibility issues:  -->Plan: ------G-CSF support: no ------Transfusions requirements: No ----------------Transfuse if platelets <10K or actively bleeding per SOP. ------Chimerism: monitor every other week and on marrow until d+180, last performed 2/19/25 (97% donor, recipient not detected). Pending 3/5/25 result.  ------Immune reconstitution due on days +100, + 180, + 365   GvHD: -------Acute: No -------Chronic: N/A -------Steroid Taper: N/A -------ECP: N/A -->Plan: ------Reviewed GvHD signs and symptoms to report ------Prophylaxis with ABC regimen. -----------PTcy -----------Abatacept IV 10mg/kg on days +28 (12/10)    ID: ---Monitoring: Send CMV PCR, Adenovirus and EBV weekly until day +180 ----------CMV: negative to date, pending today's result ----------EBV: Positive on 1/29/25= 591. 2/4/25 EBV PCR 49,500 copies. Treated with R[facility]ience 2/5/25 at [facility]. Received Ruxience on 2/12/25, EBV downtrending to 5630, Received last dose of Ruxience on 2/25/2025, EBV downtrending to 457. 3/5/25 result less than 35. Continue to monitor.  ---------Adeno: negative to date, pending today's result --->Continue ppx: ---------PCP: Bactrim ppx ---------HSV and VZV: acyclovir 800mg PO BID  ---------Fungal: posaconazole 300mg PO daily until day +75. Discontinued. ---------CMV: letermovir 480 mg PO daily ---------SOS prophylaxis: Ursodiol 300 mg BID (until day 30-60). Discontinued 1/7/25. ---------If chronic GvHD present, encapsulated organism coverage: N/A    Survivorship: - 25-hydroxyclaciferol (day +80): sent on 2/19/25 (20.8).  - TSH (annually): - Ferritin (annually): - Fertility (annual, x2 if applicable): - Bone mineral densitometry (day +365 for women, men exposed to prolonged steroids and CNI): - Vaccinations per SOP starting at day 180+ except for Flu at day + 90 and COVID per CDC guidelines.  Hypotension (RESOLVED) Increase fluid intake, maintain 2-3L/day  LDH ---LDH uptrending 243-->418 - continue to monitor.   Physical Therapy Referral for [facility] Transplant cellular therapy ordered on 2/12/25.  Patient will not attend as he is walking one mile a day and feels great.     Continue post transplant diet and crowd restrictions. Questions and concerns addressed. Reviewed signs and symptoms to report to clinic; patient has clinic and after hours number  RTC 3/12 labs, provider visit  Aracelis Heller NP [facility] Adult Transplantation and Cellular Therapy Program   I saw the patient on day +127 following his allogeneic HSCT.  I reviewed the data and the above note.   Diagnosis: AML Disease staging at transplant: CR Conditioning regimen: LIV Bu Flu rATG GvHD prophylaxis: ABC Donor type: MUD  Stem cell source: Peripheral ABO  Recipient: A positive  Donor: O positive  Incompatibility: Minor CMV Status  Recipient: Positive  Donor: Negative Toxoplasmosis Status  Recipient: Negative  Donor: Negative  He had an episode of vomiting and soft stools on Sunday. This has resolved now.  He has no skin rash. He has no LAP and no HSM.  He has engrafted with full donor chimerism. His counts are adequate.  He has no aGvHD.  He has no infectious complications other than EBV reactivation. He will get his 4th dose of rituximab. His viral load has decreased. It remains detectable.  He is on appropriate prophylaxis.  His BM studies are negative including his Onkosite (r) with no p53 mutation detected.  He has no other issues. His repeat LDH is still pending. His absolute eosinophils count was noted. We will monitor.  He understands his current status and precautions.  He will return to clinic in 1 week.  I spent a total of 30 minutes performing the above tasks. MARISABEL Mena MD

## 2025-03-19 NOTE — HISTORY OF PRESENT ILLNESS
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 Bay  Referring physician:  Diagnosis: AML  Disease staging at transplant: CR Conditioning regimen: LIV BuFluATG GvHD prophylaxis: PTCyBorAba (ABC) on study  Donor type: MUD                    Mismatches: N/A, 10/10                    Stem cell source: Peripheral  ABO         Recipient: A positive         Donor: O positive          Incompatibility: Minor  CMV Status  Recipient: Positive  Donor: Negative Toxoplasmosis Status  Recipient: Negative   Donor: Negative    PMH:  BPH, AML     -----------------Oncologic Hx:-------------------------------- He had been well, exercising regularly, three miles/day. By early 7/2024, he had developed NGUYEN on climbing stairs. Pancytopenia with circulating blasts were detected and he was admitted to [facility] 7/18/2024. 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 He was treated with decitabine and venetoclax x 3 cycles.    -----------Transplant hospital course:---------------------  Tolerated well, c/b increased BP due to post transplant cytoxan fluids. Additionally c/b hematuria, interstitial mucositis.  -----Day 0 = 11/12/24  --------------Post Transplant Course:-----------------------   -------------Oncological treatments:-------------------------- Decitabine and venetoclax 100mg 21d   ---------------Bone Marrow Bx Results:---------------------- Bone Marrow Biopsy, Clot and Bone Marrow Aspirate, Right PIC -MDS/AML with mutated TP53 ( per International Consensus  Classification of Myeloid Neoplasms and Acute Leukemias) -MDS with biallelic TP53 inactivation (WHO-WNTA1oi ed). Hypercellular marrow for age with multilineage dysplasia, and increased blasts/immature cells (12% on aspirate differential count, 10-15% by CD34 IHC stain). -Abnormal Karyotype 46-47,XY,del(7)(q11.2),+8,del(8)(q13q22)x2,del(9)(q13q22),-11,-12, add(17)(p11.2),del(20)(q11.2q13.3),+1-2mar                {cp13                                              }/46,XY                {7                                              } -FISH studies detected 7q deletion (60%), Trisomy 8 (62.5%), 20q deletion (59.5%) and TP53 deletion (55.5%). -OnkoSit Advanced NGS Myeloid Report detected (Tier I) TP53 p.[id number] (VAF38%) and U2AF1 p.Ary613Cig (VAF32%). There are increased blasts/immature cells (12% on aspirate differential count and approximately 10-15% of the cellularity by CD34 immunohistochemical stain).  8/15/24:  Bone marrow biopsy and bone marrow aspirate  - Cellular marrow (30-40%) with markedly decreased myelopoiesis, decreased left shifted erythropoiesis and relatively normal megakaryopoiesis with no increase in blast population  - Persistent 7q deletion (1%), trisomy 8 (2.5%), 20q deletion (2.5%) and TP53 deletion (0.5%) by FISH studies, below the cut-off values  _ Persistent mutations in TP53 p.Vnf818Smg and U2AF1 p.Rxt704Duq at low VAF levels  10/15/24 (Pretransplant BmBx) ----Morphology: Normocellular bone marrow with trilineage regeneration (focal myeloid predominant, focal erythroid predominant, overall decreased megakaryocytes with focally normal numbers and normal morphology, and increased iron stores (history of MDS/AML with complex karyotype and TP53 deletion, under treatment)      - No morphologic or immunophenotypic findings of bone marrow involvement by leukemia are identified ----Karyotype: Normal ----FISH: deletion of TP53 below cut off value  ----NGS: Normal    - Day 30 post transplant: completed 12/12/24  ----Morphology:SANJEEV ----Karyotype: //46,XX[20] ----FISH: Normal FISH ----NGS: Onkosight Advanced NGS Myeloid Panel is normal. No mutations identified.    - Day 100 post transplant: completed 2/19/25 ----Morphology: Trilineage hematopoiesis with maturation and increased iron stores. No morphologic or immunophenotypic features of bone marrow involvement by MDS/AML are identified. ----Karyotype:46,XX[20] ----FISH: Normal Fish ----NGS:[employer] NGS panel- no mutations identified.   - Day 180 post transplant: Due 5/12/25 ----Morphology: ----Karyotype ----FISH ----NGS:   ---------Past Surgical Hx---------- Partial mastoidectomy and requires periodic left mastoid debridement  -------- -Social Hx ----------------- The patient is  and has 2 daughters, 30 and 27y/o local in [place]  Patient has one brother (69)  Work: Nephrologist  Born in: [place]  Never a smoker/Social alcohol use  ----------Past Family Hx:------------ Noncontributory  [FreeTextEntry1] : Phase I-II Study of High-Dose Post-Transplant Cyclophosphamide, Bortezomib, and Abatacept for the Prevention of Vxwce-jxtguu-Updp Disease (GvHD) following Allogeneic Hematopoietic Stem Cell Transplantation (HSCT) [de-identified] : 3/12/25: Day + 119 post-transplant. Patient reports having a mild non-productive cough, reports due to hx sinusitis and allergies. Lungs sounds clear, denies tenderness on frontal, or maxillary area. Reports to have good appetite. Wt has be improving. Continues to drink 2L of fluids per day. Reports1-2 BM loose/soft stools. Denies diarrhea. Otherwise, no fever, vomiting, diarrhea, rash, mouth sores, nausea or any signs of active bleeding. Denies SOB or B/L LE edema. Taking all medications as prescribed.   3/19/25: Day + 127 post transplant.

## 2025-03-19 NOTE — PHYSICAL EXAM
[Restricted in physically strenuous activity but ambulatory and able to carry out work of a light or sedentary nature] : Status 1- Restricted in physically strenuous activity but ambulatory and able to carry out work of a light or sedentary nature, e.g., light house work, office work [Normal] : affect appropriate [de-identified] : Dry Skin

## 2025-03-19 NOTE — REVIEW OF SYSTEMS
[Fatigue] : fatigue [Cough] : cough [Negative] : Heme/Lymph [Fever] : no fever [Chills] : no chills [Night Sweats] : no night sweats [Recent Change In Weight] : ~T no recent weight change [Vision Problems] : no vision problems [Nosebleeds] : no nosebleeds [Shortness Of Breath] : no shortness of breath [Wheezing] : no wheezing [SOB on Exertion] : no shortness of breath during exertion [Vomiting] : no vomiting [Constipation] : no constipation [Dysuria] : no dysuria [FreeTextEntry6] : Non-productive cough [de-identified] : Dry Skin

## 2025-03-19 NOTE — ASSESSMENT
[FreeTextEntry1] : 72 yo with pmhx of AML, s/p HSCT transplant on 11/12/24. Today is day +127.   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 ----------Post transplant maintenance therapy: N/A ------------------Indication: N/A ----------DLI given: N/A Disease monitoring: --> Post transplant bone marrow biopsy on Day +30 (completed 12/12/24), Day+ 100 (2/19/25), and Day +180 (5/12/25).   Engraftment: ------Chimerism: 11/26/24 chimerism is greater than 97% chimerism, recipient not detected. ------ANC engraftment date: 11/26/24 ------Platelets engraftment date: 11/29/24, last received platelets on 11/22/24.  ------ABO Rh incompatibility issues:  -->Plan: ------G-CSF support: no ------Transfusions requirements: No ----------------Transfuse if platelets <10K or actively bleeding per SOP. ------Chimerism: monitor every other week and on marrow until d+180, last performed 2/19/25 (97% donor, recipient not detected). Pending 3/5/25 result.  ------Immune reconstitution due on days +100, + 180, + 365   GvHD: -------Acute: No -------Chronic: N/A -------Steroid Taper: N/A -------ECP: N/A -->Plan: ------Reviewed GvHD signs and symptoms to report ------Prophylaxis with ABC regimen. -----------PTcy -----------Abatacept IV 10mg/kg on days +28 (12/10)    ID: ---Monitoring: Send CMV PCR, Adenovirus and EBV weekly until day +180 ----------CMV: negative to date, pending today's result ----------EBV: Positive on 1/29/25= 591. 2/4/25 EBV PCR 49,500 copies. Treated with Ruxience 2/5/25 at [facility]. Received Ruxience on 2/12/25, EBV downtrending to 5630, Received last dose of Ruxience on 2/25/2025, EBV downtrending to 457. 3/5/25 result less than 35. Continue to monitor.  ---------Adeno: negative to date, pending today's result --->Continue ppx: ---------PCP: Bactrim ppx ---------HSV and VZV: acyclovir 800mg PO BID  ---------Fungal: posaconazole 300mg PO daily until day +75. Discontinued. ---------CMV: letermovir 480 mg PO daily ---------SOS prophylaxis: Ursodiol 300 mg BID (until day 30-60). Discontinued 1/7/25. ---------If chronic GvHD present, encapsulated organism coverage: N/A    Survivorship: - 25-hydroxyclaciferol (day +80): sent on 2/19/25 (20.8).  - TSH (annually): - Ferritin (annually): - Fertility (annual, x2 if applicable): - Bone mineral densitometry (day +365 for women, men exposed to prolonged steroids and CNI): - Vaccinations per SOP starting at day 180+ except for Flu at day + 90 and COVID per CDC guidelines.  Hypotension (RESOLVED) Increase fluid intake, maintain 2-3L/day  LDH ---LDH uptrending 243-->418 - continue to monitor.   Physical Therapy Referral for [facility] Transplant cellular therapy ordered on 2/12/25.  Patient will not attend as he is walking one mile a day and feels great.     Continue post transplant diet and crowd restrictions. Questions and concerns addressed. Reviewed signs and symptoms to report to clinic; patient has clinic and after hours number  RTC 3/12 labs, provider visit  Aracelis Heller NP [facility] Adult Transplantation and Cellular Therapy Program   I saw the patient on day +127 following his allogeneic HSCT.  I reviewed the data and the above note.   Diagnosis: AML Disease staging at transplant: CR Conditioning regimen: LIV Bu Flu rATG GvHD prophylaxis: ABC Donor type: MUD  Stem cell source: Peripheral ABO  Recipient: A positive  Donor: O positive  Incompatibility: Minor CMV Status  Recipient: Positive  Donor: Negative Toxoplasmosis Status  Recipient: Negative  Donor: Negative  He had an episode of vomiting and soft stools on Sunday. This has resolved now.  He has no skin rash. He has no LAP and no HSM.  He has engrafted with full donor chimerism. His counts are adequate.  He has no aGvHD.  He has no infectious complications other than EBV reactivation. He will get his 4th dose of rituximab. His viral load has decreased. It remains detectable.  He is on appropriate prophylaxis.  His BM studies are negative including his Onkosite (r) with no p53 mutation detected.  He has no other issues. His repeat LDH is still pending. His absolute eosinophils count was noted. We will monitor.  He understands his current status and precautions.  He will return to clinic in 1 week.  I spent a total of 30 minutes performing the above tasks. MARISABEL Mena MD

## 2025-03-19 NOTE — HISTORY OF PRESENT ILLNESS
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 Bay  Referring physician:  Diagnosis: AML  Disease staging at transplant: CR Conditioning regimen: LIV BuFluATG GvHD prophylaxis: PTCyBorAba (ABC) on study  Donor type: MUD                    Mismatches: N/A, 10/10                    Stem cell source: Peripheral  ABO         Recipient: A positive         Donor: O positive          Incompatibility: Minor  CMV Status  Recipient: Positive  Donor: Negative Toxoplasmosis Status  Recipient: Negative   Donor: Negative    PMH:  BPH, AML     -----------------Oncologic Hx:-------------------------------- He had been well, exercising regularly, three miles/day. By early 7/2024, he had developed NGUYEN on climbing stairs. Pancytopenia with circulating blasts were detected and he was admitted to [facility] 7/18/2024. 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 He was treated with decitabine and venetoclax x 3 cycles.    -----------Transplant hospital course:---------------------  Tolerated well, c/b increased BP due to post transplant cytoxan fluids. Additionally c/b hematuria, interstitial mucositis.  -----Day 0 = 11/12/24  --------------Post Transplant Course:-----------------------   -------------Oncological treatments:-------------------------- Decitabine and venetoclax 100mg 21d   ---------------Bone Marrow Bx Results:---------------------- Bone Marrow Biopsy, Clot and Bone Marrow Aspirate, Right PIC -MDS/AML with mutated TP53 ( per International Consensus  Classification of Myeloid Neoplasms and Acute Leukemias) -MDS with biallelic TP53 inactivation (WHO-WNAP8ma ed). Hypercellular marrow for age with multilineage dysplasia, and increased blasts/immature cells (12% on aspirate differential count, 10-15% by CD34 IHC stain). -Abnormal Karyotype 46-47,XY,del(7)(q11.2),+8,del(8)(q13q22)x2,del(9)(q13q22),-11,-12, add(17)(p11.2),del(20)(q11.2q13.3),+1-2mar                {cp13                                              }/46,XY                {7                                              } -FISH studies detected 7q deletion (60%), Trisomy 8 (62.5%), 20q deletion (59.5%) and TP53 deletion (55.5%). -OnkoSit Advanced NGS Myeloid Report detected (Tier I) TP53 p.Hvv048Img (VAF38%) and U2AF1 p.Xqa907Fnx (VAF32%). There are increased blasts/immature cells (12% on aspirate differential count and approximately 10-15% of the cellularity by CD34 immunohistochemical stain).  8/15/24:  Bone marrow biopsy and bone marrow aspirate  - Cellular marrow (30-40%) with markedly decreased myelopoiesis, decreased left shifted erythropoiesis and relatively normal megakaryopoiesis with no increase in blast population  - Persistent 7q deletion (1%), trisomy 8 (2.5%), 20q deletion (2.5%) and TP53 deletion (0.5%) by FISH studies, below the cut-off values  _ Persistent mutations in TP53 p.Fvd171Rhj and U2AF1 p.Ljq261Qty at low VAF levels  10/15/24 (Pretransplant BmBx) ----Morphology: Normocellular bone marrow with trilineage regeneration (focal myeloid predominant, focal erythroid predominant, overall decreased megakaryocytes with focally normal numbers and normal morphology, and increased iron stores (history of MDS/AML with complex karyotype and TP53 deletion, under treatment)      - No morphologic or immunophenotypic findings of bone marrow involvement by leukemia are identified ----Karyotype: Normal ----FISH: deletion of TP53 below cut off value  ----NGS: Normal    - Day 30 post transplant: completed 12/12/24  ----Morphology:SANJEEV ----Karyotype: //46,XX[20] ----FISH: Normal FISH ----NGS: Onkosight Advanced NGS Myeloid Panel is normal. No mutations identified.    - Day 100 post transplant: completed 2/19/25 ----Morphology: Trilineage hematopoiesis with maturation and increased iron stores. No morphologic or immunophenotypic features of bone marrow involvement by MDS/AML are identified. ----Karyotype:46,XX[20] ----FISH: Normal Fish ----NGS:[employer] NGS panel- no mutations identified.   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 Patient reports having a mild non-productive cough, reports due to hx sinusitis and allergies. Lungs sounds clear, denies tenderness on frontal, or maxillary area. Reports to have good appetite. Wt has be improving. Continues to drink 2L of fluids per day. Reports1-2 BM loose/soft stools. Denies diarrhea. Otherwise, no fever, vomiting, diarrhea, rash, mouth sores, nausea or any signs of active bleeding. Denies SOB or B/L LE edema. Taking all medications as prescribed.   3/19/25: Day + 127 post transplant.

## 2025-03-19 NOTE — PHYSICAL EXAM
[Restricted in physically strenuous activity but ambulatory and able to carry out work of a light or sedentary nature] : Status 1- Restricted in physically strenuous activity but ambulatory and able to carry out work of a light or sedentary nature, e.g., light house work, office work [Normal] : affect appropriate [de-identified] : Dry Skin

## 2025-03-19 NOTE — ASSESSMENT
[FreeTextEntry1] : 72 yo with pmhx of AML, s/p HSCT transplant on 11/12/24. Today is day +127.   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 ----------Post transplant maintenance therapy: N/A ------------------Indication: N/A ----------DLI given: N/A Disease monitoring: --> Post transplant bone marrow biopsy on Day +30 (completed 12/12/24), Day+ 100 (2/19/25), and Day +180 (5/12/25).   Engraftment: ------Chimerism: 11/26/24 chimerism is greater than 97% chimerism, recipient not detected. ------ANC engraftment date: 11/26/24 ------Platelets engraftment date: 11/29/24, last received platelets on 11/22/24.  ------ABO Rh incompatibility issues:  -->Plan: ------G-CSF support: no ------Transfusions requirements: No ----------------Transfuse if platelets <10K or actively bleeding per SOP. ------Chimerism: monitor every other week and on marrow until d+180, last performed 2/19/25 (97% donor, recipient not detected). Pending 3/5/25 result.  ------Immune reconstitution due on days +100, + 180, + 365   GvHD: -------Acute: No -------Chronic: N/A -------Steroid Taper: N/A -------ECP: N/A -->Plan: ------Reviewed GvHD signs and symptoms to report ------Prophylaxis with ABC regimen. -----------PTcy -----------Abatacept IV 10mg/kg on days +28 (12/10)    ID: ---Monitoring: Send CMV PCR, Adenovirus and EBV weekly until day +180 ----------CMV: negative to date, pending today's result ----------EBV: Positive on 1/29/25= 591. 2/4/25 EBV PCR 49,500 copies. Treated with Ruxience 2/5/25 at [facility]. Received Ruxience on 2/12/25, EBV downtrending to 5630, Received last dose of Ruxience on 2/25/2025, EBV downtrending to 457. 3/5/25 result less than 35. Continue to monitor.  ---------Adeno: negative to date, pending today's result --->Continue ppx: ---------PCP: Bactrim ppx ---------HSV and VZV: acyclovir 800mg PO BID  ---------Fungal: posaconazole 300mg PO daily until day +75. Discontinued. ---------CMV: letermovir 480 mg PO daily ---------SOS prophylaxis: Ursodiol 300 mg BID (until day 30-60). Discontinued 1/7/25. ---------If chronic GvHD present, encapsulated organism coverage: N/A    Survivorship: - 25-hydroxyclaciferol (day +80): sent on 2/19/25 (20.8).  - TSH (annually): - Ferritin (annually): - Fertility (annual, x2 if applicable): - Bone mineral densitometry (day +365 for women, men exposed to prolonged steroids and CNI): - Vaccinations per SOP starting at day 180+ except for Flu at day + 90 and COVID per CDC guidelines.  Hypotension (RESOLVED) Increase fluid intake, maintain 2-3L/day  LDH ---LDH uptrending 243-->418 - continue to monitor.   Physical Therapy Referral for [facility] Transplant cellular therapy ordered on 2/12/25.  Patient will not attend as he is walking one mile a day and feels great.     Continue post transplant diet and crowd restrictions. Questions and concerns addressed. Reviewed signs and symptoms to report to clinic; patient has clinic and after hours number  RTC 3/12 labs, provider visit  Aracelis Heller NP [facility] Adult Transplantation and Cellular Therapy Program   I saw the patient on day +127 following his allogeneic HSCT.  I reviewed the data and the above note.   Diagnosis: AML Disease staging at transplant: CR Conditioning regimen: LIV Bu Flu rATG GvHD prophylaxis: ABC Donor type: MUD  Stem cell source: Peripheral ABO  Recipient: A positive  Donor: O positive  Incompatibility: Minor CMV Status  Recipient: Positive  Donor: Negative Toxoplasmosis Status  Recipient: Negative  Donor: Negative  He had an episode of vomiting and soft stools on Sunday. This has resolved now.  He has no skin rash. He has no LAP and no HSM.  He has engrafted with full donor chimerism. His counts are adequate.  He has no aGvHD.  He has no infectious complications other than EBV reactivation. He will get his 4th dose of rituximab. His viral load has decreased. It remains detectable.  He is on appropriate prophylaxis.  His BM studies are negative including his Onkosite (r) with no p53 mutation detected.  He has no other issues. His repeat LDH is still pending. His absolute eosinophils count was noted. We will monitor.  He understands his current status and precautions.  He will return to clinic in 1 week.  I spent a total of 30 minutes performing the above tasks. MARISABEL Mena MD

## 2025-03-19 NOTE — PHYSICAL EXAM
[Restricted in physically strenuous activity but ambulatory and able to carry out work of a light or sedentary nature] : Status 1- Restricted in physically strenuous activity but ambulatory and able to carry out work of a light or sedentary nature, e.g., light house work, office work [Normal] : affect appropriate [de-identified] : Dry Skin

## 2025-03-19 NOTE — HISTORY OF PRESENT ILLNESS
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 Bay  Referring physician:  Diagnosis: AML  Disease staging at transplant: CR Conditioning regimen: LIV BuFluATG GvHD prophylaxis: PTCyBorAba (ABC) on study  Donor type: MUD                    Mismatches: N/A, 10/10                    Stem cell source: Peripheral  ABO         Recipient: A positive         Donor: O positive          Incompatibility: Minor  CMV Status  Recipient: Positive  Donor: Negative Toxoplasmosis Status  Recipient: Negative   Donor: Negative    PMH:  BPH, AML     -----------------Oncologic Hx:-------------------------------- He had been well, exercising regularly, three miles/day. By early 7/2024, he had developed NGUYEN on climbing stairs. Pancytopenia with circulating blasts were detected and he was admitted to [facility] 7/18/2024. 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 He was treated with decitabine and venetoclax x 3 cycles.    -----------Transplant hospital course:---------------------  Tolerated well, c/b increased BP due to post transplant cytoxan fluids. Additionally c/b hematuria, interstitial mucositis.  -----Day 0 = 11/12/24  --------------Post Transplant Course:-----------------------   -------------Oncological treatments:-------------------------- Decitabine and venetoclax 100mg 21d   ---------------Bone Marrow Bx Results:---------------------- Bone Marrow Biopsy, Clot and Bone Marrow Aspirate, Right PIC -MDS/AML with mutated TP53 ( per International Consensus  Classification of Myeloid Neoplasms and Acute Leukemias) -MDS with biallelic TP53 inactivation (WHO-NDQC4kk ed). Hypercellular marrow for age with multilineage dysplasia, and increased blasts/immature cells (12% on aspirate differential count, 10-15% by CD34 IHC stain). -Abnormal Karyotype 46-47,XY,del(7)(q11.2),+8,del(8)(q13q22)x2,del(9)(q13q22),-11,-12, add(17)(p11.2),del(20)(q11.2q13.3),+1-2mar                {cp13                                              }/46,XY                {7                                              } -FISH studies detected 7q deletion (60%), Trisomy 8 (62.5%), 20q deletion (59.5%) and TP53 deletion (55.5%). -[employer] Advanced NGS Myeloid Report detected (Tier I) TP53 p.Rto448Fzy (VAF38%) and U2AF1 p.Gdq191Jfd (VAF32%). There are increased blasts/immature cells (12% on aspirate differential count and approximately 10-15% of the cellularity by CD34 immunohistochemical stain).  8/15/24:  Bone marrow biopsy and bone marrow aspirate  - Cellular marrow (30-40%) with markedly decreased myelopoiesis, decreased left shifted erythropoiesis and relatively normal megakaryopoiesis with no increase in blast population  - Persistent 7q deletion (1%), trisomy 8 (2.5%), 20q deletion (2.5%) and TP53 deletion (0.5%) by FISH studies, below the cut-off values  _ Persistent mutations in TP53 p.Shi892Caz and U2AF1 p.Ihw148Qui at low VAF levels  10/15/24 (Pretransplant BmBx) ----Morphology: Normocellular bone marrow with trilineage regeneration (focal myeloid predominant, focal erythroid predominant, overall decreased megakaryocytes with focally normal numbers and normal morphology, and increased iron stores (history of MDS/AML with complex karyotype and TP53 deletion, under treatment)      - No morphologic or immunophenotypic findings of bone marrow involvement by leukemia are identified ----Karyotype: Normal ----FISH: deletion of TP53 below cut off value  ----NGS: Normal    - Day 30 post transplant: completed 12/12/24  ----Morphology:SANJEEV ----Karyotype: //46,XX[20] ----FISH: Normal FISH ----NGS: [employer] Advanced NGS Myeloid Panel is normal. No mutations identified.    - Day 100 post transplant: completed 2/19/25 ----Morphology: Trilineage hematopoiesis with maturation and increased iron stores. No morphologic or immunophenotypic features of bone marrow involvement by MDS/AML are identified. ----Karyotype:46,XX[20] ----FISH: Normal Fish ----NGS:[employer] NGS panel- no mutations identified.   - Day 180 post transplant: Due 5/12/25 ----Morphology: ----Karyotype ----FISH ----NGS:   ---------Past Surgical Hx---------- Partial mastoidectomy and requires periodic left mastoid debridement  -------- -Social Hx ----------------- The patient is  and has 2 daughters, 30 and 27y/o local in [place]  Patient has one brother (69)  Work: Nephrologist  Born in: [place]  Never a smoker/Social alcohol use  ----------Past Family Hx:------------ Noncontributory  [FreeTextEntry1] : Phase I-II Study of High-Dose Post-Transplant Cyclophosphamide, Bortezomib, and Abatacept for the Prevention of Vcqog-uwftny-Qplt Disease (GvHD) following Allogeneic Hematopoietic Stem Cell Transplantation (HSCT) [de-identified] : 3/12/25: Day + 119 post-transplant. Patient reports having a mild non-productive cough, reports due to hx sinusitis and allergies. Lungs sounds clear, denies tenderness on frontal, or maxillary area. Reports to have good appetite. Wt has be improving. Continues to drink 2L of fluids per day. Reports1-2 BM loose/soft stools. Denies diarrhea. Otherwise, no fever, vomiting, diarrhea, rash, mouth sores, nausea or any signs of active bleeding. Denies SOB or B/L LE edema. Taking all medications as prescribed.   3/19/25: Day + 127 post transplant.

## 2025-03-19 NOTE — REVIEW OF SYSTEMS
[Fatigue] : fatigue [Cough] : cough [Negative] : Heme/Lymph [Fever] : no fever [Chills] : no chills [Night Sweats] : no night sweats [Recent Change In Weight] : ~T no recent weight change [Vision Problems] : no vision problems [Nosebleeds] : no nosebleeds [Shortness Of Breath] : no shortness of breath [Wheezing] : no wheezing [SOB on Exertion] : no shortness of breath during exertion [Vomiting] : no vomiting [Constipation] : no constipation [Dysuria] : no dysuria [FreeTextEntry6] : Non-productive cough [de-identified] : Dry Skin

## 2025-03-27 NOTE — REVIEW OF SYSTEMS
[Fatigue] : fatigue [Cough] : cough [Negative] : Heme/Lymph [Fever] : no fever [Chills] : no chills [Night Sweats] : no night sweats [Recent Change In Weight] : ~T no recent weight change [Vision Problems] : no vision problems [Nosebleeds] : no nosebleeds [Shortness Of Breath] : no shortness of breath [Wheezing] : no wheezing [SOB on Exertion] : no shortness of breath during exertion [Vomiting] : no vomiting [Constipation] : no constipation [Dysuria] : no dysuria [FreeTextEntry6] : Non-productive cough [de-identified] : Dry Skin

## 2025-03-27 NOTE — REVIEW OF SYSTEMS
[Fatigue] : fatigue [Cough] : cough [Negative] : Heme/Lymph [Fever] : no fever [Chills] : no chills [Night Sweats] : no night sweats [Recent Change In Weight] : ~T no recent weight change [Vision Problems] : no vision problems [Nosebleeds] : no nosebleeds [Shortness Of Breath] : no shortness of breath [Wheezing] : no wheezing [SOB on Exertion] : no shortness of breath during exertion [Vomiting] : no vomiting [Constipation] : no constipation [Dysuria] : no dysuria [FreeTextEntry6] : Non-productive cough [de-identified] : Dry Skin

## 2025-03-27 NOTE — HISTORY OF PRESENT ILLNESS
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 Bay  Referring physician:  Diagnosis: AML  Disease staging at transplant: CR Conditioning regimen: LIV BuFluATG GvHD prophylaxis: PTCyBorAba (ABC) on study  Donor type: MUD                    Mismatches: N/A, 10/10                    Stem cell source: Peripheral  ABO         Recipient: A positive         Donor: O positive          Incompatibility: Minor  CMV Status  Recipient: Positive  Donor: Negative Toxoplasmosis Status  Recipient: Negative   Donor: Negative    PMH:  BPH, AML     -----------------Oncologic Hx:-------------------------------- He had been well, exercising regularly, three miles/day. By early 7/2024, he had developed NGUYEN on climbing stairs. Pancytopenia with circulating blasts were detected and he was admitted to [facility] 7/18/2024. 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 He was treated with decitabine and venetoclax x 3 cycles.    -----------Transplant hospital course:---------------------  Tolerated well, c/b increased BP due to post transplant cytoxan fluids. Additionally c/b hematuria, interstitial mucositis.  -----Day 0 = 11/12/24  --------------Post Transplant Course:-----------------------   -------------Oncological treatments:-------------------------- Decitabine and venetoclax 100mg 21d   ---------------Bone Marrow Bx Results:---------------------- Bone Marrow Biopsy, Clot and Bone Marrow Aspirate, Right PIC -MDS/AML with mutated TP53 ( per International Consensus  Classification of Myeloid Neoplasms and Acute Leukemias) -MDS with biallelic TP53 inactivation (WHO-RMTO1xg ed). Hypercellular marrow for age with multilineage dysplasia, and increased blasts/immature cells (12% on aspirate differential count, 10-15% by CD34 IHC stain). -Abnormal Karyotype 46-47,XY,del(7)(q11.2),+8,del(8)(q13q22)x2,del(9)(q13q22),-11,-12, add(17)(p11.2),del(20)(q11.2q13.3),+1-2mar                 {cp13                                                 \}/46,XY                 {7                                                 \} -FISH studies detected 7q deletion (60%), Trisomy 8 (62.5%), 20q deletion (59.5%) and TP53 deletion (55.5%). -On[employer] Advanced NGS Myeloid Report detected (Tier I) TP53 p.Kam411Kxq (VAF38%) and U2AF1 p.Xvq451Non (VAF32%). There are increased blasts/immature cells (12% on aspirate differential count and approximately 10-15% of the cellularity by CD34 immunohistochemical stain).  8/15/24:  Bone marrow biopsy and bone marrow aspirate  - Cellular marrow (30-40%) with markedly decreased myelopoiesis, decreased left shifted erythropoiesis and relatively normal megakaryopoiesis with no increase in blast population  - Persistent 7q deletion (1%), trisomy 8 (2.5%), 20q deletion (2.5%) and TP53 deletion (0.5%) by FISH studies, below the cut-off values  _ Persistent mutations in TP53 p.Hqn053Yck and U2AF1 p.Lss232Dhi at low VAF levels  10/15/24 (Pretransplant BmBx) ----Morphology: Normocellular bone marrow with trilineage regeneration (focal myeloid predominant, focal erythroid predominant, overall decreased megakaryocytes with focally normal numbers and normal morphology, and increased iron stores (history of MDS/AML with complex karyotype and TP53 deletion, under treatment)      - No morphologic or immunophenotypic findings of bone marrow involvement by leukemia are identified ----Karyotype: Normal ----FISH: deletion of TP53 below cut off value  ----NGS: Normal    - Day 30 post transplant: completed 12/12/24  ----Morphology:SANJEEV ----Karyotype: //46,XX[20] ----FISH: Normal FISH ----NGS: [employer] Advanced NGS Myeloid Panel is normal. No mutations identified.    - Day 100 post transplant: completed 2/19/25 ----Morphology: Trilineage hematopoiesis with maturation and increased iron stores. No morphologic or immunophenotypic features of bone marrow involvement by MDS/AML are identified. ----Karyotype:46,XX[20] ----FISH: Normal Fish ----NGS:[employer] NGS panel- no mutations identified.   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 Overall feels great and offers no acute concerns. Patient reports having a mild non-productive cough, reports due to hx sinusitis and allergies. Denies fever, chills, nausea, vomiting, diarrhea, rash, mouth sores, dysuria or any signs of active bleeding. Denies SOB, chest pain or B/L LE edema. Taking all medications as prescribed.

## 2025-03-27 NOTE — PHYSICAL EXAM
[Restricted in physically strenuous activity but ambulatory and able to carry out work of a light or sedentary nature] : Status 1- Restricted in physically strenuous activity but ambulatory and able to carry out work of a light or sedentary nature, e.g., light house work, office work [Normal] : affect appropriate [de-identified] : Dry Skin

## 2025-03-27 NOTE — ASSESSMENT
[FreeTextEntry1] : 70 yo with pmhx of AML, s/p HSCT transplant on 11/12/24. Today is day +134.   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 ----------Post transplant maintenance therapy: N/A ------------------Indication: N/A ----------DLI given: N/A Disease monitoring: --> Post transplant bone marrow biopsy on Day +30 (completed 12/12/24), Day+ 100 (2/19/25), and Day +180 (5/12/25).   Engraftment: ------Chimerism: 11/26/24 chimerism is greater than 97% chimerism, recipient not detected. ------ANC engraftment date: 11/26/24 ------Platelets engraftment date: 11/29/24, last received platelets on 11/22/24.  ------ABO Rh incompatibility issues:  -->Plan: ------G-CSF support: no ------Transfusions requirements: No ----------------Transfuse if platelets <10K or actively bleeding per SOP. ------Chimerism: monitor every other week and on marrow until d+180, last performed 3/19/25 (97% donor, recipient not detected).  ------Immune reconstitution due on days +100, + 180, + 365   GvHD: -------Acute: No -------Chronic: N/A -------Steroid Taper: N/A -------ECP: N/A -->Plan: ------Reviewed GvHD signs and symptoms to report ------Prophylaxis with ABC regimen. -----------PTcy -----------Abatacept IV 10mg/kg on days +28 (12/10)    ID: ---Monitoring: Send CMV PCR, Adenovirus and EBV weekly until day +180 ----------CMV: negative to date ----------EBV: Positive on 1/29/25= 591. 2/4/25 EBV PCR 49,500 copies. Treated with Ruxience 2/5/25 at [facility]. Received Ruxience on 2/12/25, EBV downtrending to 5630, Received last dose of Ruxience on 2/25/2025, EBV downtrending to 457. 3/5/25 result less than 35. 3/19/25 EBV not detected. 3/26/25 EBV low positive (less than 35). Continue to monitor.  ---------Adeno: negative to date, pending today's result --->Continue ppx: ---------PCP: Bactrim ppx ---------HSV and VZV: acyclovir 800mg PO BID  ---------Fungal: posaconazole 300mg PO daily until day +75. Discontinued. ---------CMV: letermovir 480 mg PO daily ---------SOS prophylaxis: Ursodiol 300 mg BID (until day 30-60). Discontinued 1/7/25. ---------If chronic GvHD present, encapsulated organism coverage: N/A    Survivorship: - 25-hydroxyclaciferol (day +80): sent on 2/19/25 (20.8).  - TSH (annually): - Ferritin (annually): - Fertility (annual, x2 if applicable): - Bone mineral densitometry (day +365 for women, men exposed to prolonged steroids and CNI): - Vaccinations per SOP starting at day 180+ except for Flu at day + 90 and COVID per CDC guidelines. Received flu vaccine on 3/19/25.   Hypotension (RESOLVED) Increase fluid intake, maintain 2-3L/day  LDH ---LDH down trending 243-->418->333-->317 - continue to monitor.   Physical Therapy Referral for [facility] Transplant cellular therapy ordered on 2/12/25.  Patient will not attend as he is walking one mile a day and feels great.   Continue post transplant diet and crowd restrictions. Questions and concerns addressed. Reviewed signs and symptoms to report to clinic; patient has clinic and after hours number  RTC Follow up every two weeks 4/8/25 labs, provider visit  Linette Houston NP [facility] Adult Transplantation and Cellular Therapy Program   I saw the patient on day +134 following his allogeneic HSCT.  I reviewed the data and the above note.   Diagnosis: AML Disease staging at transplant: CR Conditioning regimen: LIV Bu Flu rATG GvHD prophylaxis: ABC Donor type: MUD  Stem cell source: Peripheral ABO  Recipient: A positive  Donor: O positive  Incompatibility: Minor CMV Status  Recipient: Positive  Donor: Negative Toxoplasmosis Status  Recipient: Negative  Donor: Negative He has been doing well with no complaints. He had no more issues with his stools.  He has no skin rash. His oral cavity is normal.  He has engrafted with full donor chimerism. His counts are adequate.  He has no aGvHD.  He has no infectious complications. His viral studies now including his EBV are negative.  He is on appropriate prophylaxis.  His BM studies are negative including his Onkosite (r) with no p53 mutation detected.  He has no other issues. His LDH and absolute eosinophils have decreased.  He understands his current status and precautions.  He will return to clinic in 2 weeks.  I spent a total of 25 minutes performing the above tasks. MARISABEL Mena MD

## 2025-03-27 NOTE — HISTORY OF PRESENT ILLNESS
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 Bay  Referring physician:  Diagnosis: AML  Disease staging at transplant: CR Conditioning regimen: LIV BuFluATG GvHD prophylaxis: PTCyBorAba (ABC) on study  Donor type: MUD                    Mismatches: N/A, 10/10                    Stem cell source: Peripheral  ABO         Recipient: A positive         Donor: O positive          Incompatibility: Minor  CMV Status  Recipient: Positive  Donor: Negative Toxoplasmosis Status  Recipient: Negative   Donor: Negative    PMH:  BPH, AML     -----------------Oncologic Hx:-------------------------------- He had been well, exercising regularly, three miles/day. By early 7/2024, he had developed NGUYEN on climbing stairs. Pancytopenia with circulating blasts were detected and he was admitted to [facility] 7/18/2024. 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 He was treated with decitabine and venetoclax x 3 cycles.    -----------Transplant hospital course:---------------------  Tolerated well, c/b increased BP due to post transplant cytoxan fluids. Additionally c/b hematuria, interstitial mucositis.  -----Day 0 = 11/12/24  --------------Post Transplant Course:-----------------------   -------------Oncological treatments:-------------------------- Decitabine and venetoclax 100mg 21d   ---------------Bone Marrow Bx Results:---------------------- Bone Marrow Biopsy, Clot and Bone Marrow Aspirate, Right PIC -MDS/AML with mutated TP53 ( per International Consensus  Classification of Myeloid Neoplasms and Acute Leukemias) -MDS with biallelic TP53 inactivation (WHO-YORG8wz ed). Hypercellular marrow for age with multilineage dysplasia, and increased blasts/immature cells (12% on aspirate differential count, 10-15% by CD34 IHC stain). -Abnormal Karyotype 46-47,XY,del(7)(q11.2),+8,del(8)(q13q22)x2,del(9)(q13q22),-11,-12, add(17)(p11.2),del(20)(q11.2q13.3),+1-2mar                 {cp13                                                 \}/46,XY                 {7                                                 \} -FISH studies detected 7q deletion (60%), Trisomy 8 (62.5%), 20q deletion (59.5%) and TP53 deletion (55.5%). -[employer] Advanced NGS Myeloid Report detected (Tier I) TP53 p.Jvk257Yml (VAF38%) and U2AF1 p.Mdy911Tvp (VAF32%). There are increased blasts/immature cells (12% on aspirate differential count and approximately 10-15% of the cellularity by CD34 immunohistochemical stain).  8/15/24:  Bone marrow biopsy and bone marrow aspirate  - Cellular marrow (30-40%) with markedly decreased myelopoiesis, decreased left shifted erythropoiesis and relatively normal megakaryopoiesis with no increase in blast population  - Persistent 7q deletion (1%), trisomy 8 (2.5%), 20q deletion (2.5%) and TP53 deletion (0.5%) by FISH studies, below the cut-off values  _ Persistent mutations in TP53 p.Cak214Ism and U2AF1 p.Hvx743Yyn at low VAF levels  10/15/24 (Pretransplant BmBx) ----Morphology: Normocellular bone marrow with trilineage regeneration (focal myeloid predominant, focal erythroid predominant, overall decreased megakaryocytes with focally normal numbers and normal morphology, and increased iron stores (history of MDS/AML with complex karyotype and TP53 deletion, under treatment)      - No morphologic or immunophenotypic findings of bone marrow involvement by leukemia are identified ----Karyotype: Normal ----FISH: deletion of TP53 below cut off value  ----NGS: Normal    - Day 30 post transplant: completed 12/12/24  ----Morphology:SANJEEV ----Karyotype: //46,XX[20] ----FISH: Normal FISH ----NGS: [employer] Advanced NGS Myeloid Panel is normal. No mutations identified.    - Day 100 post transplant: completed 2/19/25 ----Morphology: Trilineage hematopoiesis with maturation and increased iron stores. No morphologic or immunophenotypic features of bone marrow involvement by MDS/AML are identified. ----Karyotype:46,XX[20] ----FISH: Normal Fish ----NGS:[employer] NGS panel- no mutations identified.   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 Overall feels great and offers no acute concerns. Patient reports having a mild non-productive cough, reports due to hx sinusitis and allergies. Denies fever, chills, nausea, vomiting, diarrhea, rash, mouth sores, dysuria or any signs of active bleeding. Denies SOB, chest pain or B/L LE edema. Taking all medications as prescribed.

## 2025-03-27 NOTE — PHYSICAL EXAM
[Restricted in physically strenuous activity but ambulatory and able to carry out work of a light or sedentary nature] : Status 1- Restricted in physically strenuous activity but ambulatory and able to carry out work of a light or sedentary nature, e.g., light house work, office work [Normal] : affect appropriate [de-identified] : Dry Skin

## 2025-03-27 NOTE — ASSESSMENT
[FreeTextEntry1] : 70 yo with pmhx of AML, s/p HSCT transplant on 11/12/24. Today is day +134.   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 ----------Post transplant maintenance therapy: N/A ------------------Indication: N/A ----------DLI given: N/A Disease monitoring: --> Post transplant bone marrow biopsy on Day +30 (completed 12/12/24), Day+ 100 (2/19/25), and Day +180 (5/12/25).   Engraftment: ------Chimerism: 11/26/24 chimerism is greater than 97% chimerism, recipient not detected. ------ANC engraftment date: 11/26/24 ------Platelets engraftment date: 11/29/24, last received platelets on 11/22/24.  ------ABO Rh incompatibility issues:  -->Plan: ------G-CSF support: no ------Transfusions requirements: No ----------------Transfuse if platelets <10K or actively bleeding per SOP. ------Chimerism: monitor every other week and on marrow until d+180, last performed 3/19/25 (97% donor, recipient not detected).  ------Immune reconstitution due on days +100, + 180, + 365   GvHD: -------Acute: No -------Chronic: N/A -------Steroid Taper: N/A -------ECP: N/A -->Plan: ------Reviewed GvHD signs and symptoms to report ------Prophylaxis with ABC regimen. -----------PTcy -----------Abatacept IV 10mg/kg on days +28 (12/10)    ID: ---Monitoring: Send CMV PCR, Adenovirus and EBV weekly until day +180 ----------CMV: negative to date ----------EBV: Positive on 1/29/25= 591. 2/4/25 EBV PCR 49,500 copies. Treated with Ruxience 2/5/25 at [facility]. Received Ruxience on 2/12/25, EBV downtrending to 5630, Received last dose of Ruxience on 2/25/2025, EBV downtrending to 457. 3/5/25 result less than 35. 3/19/25 EBV not detected. 3/26/25 EBV low positive (less than 35). Continue to monitor.  ---------Adeno: negative to date, pending today's result --->Continue ppx: ---------PCP: Bactrim ppx ---------HSV and VZV: acyclovir 800mg PO BID  ---------Fungal: posaconazole 300mg PO daily until day +75. Discontinued. ---------CMV: letermovir 480 mg PO daily ---------SOS prophylaxis: Ursodiol 300 mg BID (until day 30-60). Discontinued 1/7/25. ---------If chronic GvHD present, encapsulated organism coverage: N/A    Survivorship: - 25-hydroxyclaciferol (day +80): sent on 2/19/25 (20.8).  - TSH (annually): - Ferritin (annually): - Fertility (annual, x2 if applicable): - Bone mineral densitometry (day +365 for women, men exposed to prolonged steroids and CNI): - Vaccinations per SOP starting at day 180+ except for Flu at day + 90 and COVID per CDC guidelines. Received flu vaccine on 3/19/25.   Hypotension (RESOLVED) Increase fluid intake, maintain 2-3L/day  LDH ---LDH down trending 243-->418->333-->317 - continue to monitor.   Physical Therapy Referral for [facility] Transplant cellular therapy ordered on 2/12/25.  Patient will not attend as he is walking one mile a day and feels great.   Continue post transplant diet and crowd restrictions. Questions and concerns addressed. Reviewed signs and symptoms to report to clinic; patient has clinic and after hours number  RTC Follow up every two weeks 4/8/25 labs, provider visit  Linette Houston NP [facility] Adult Transplantation and Cellular Therapy Program   I saw the patient on day +134 following his allogeneic HSCT.  I reviewed the data and the above note.   Diagnosis: AML Disease staging at transplant: CR Conditioning regimen: LIV Bu Flu rATG GvHD prophylaxis: ABC Donor type: MUD  Stem cell source: Peripheral ABO  Recipient: A positive  Donor: O positive  Incompatibility: Minor CMV Status  Recipient: Positive  Donor: Negative Toxoplasmosis Status  Recipient: Negative  Donor: Negative He has been doing well with no complaints. He had no more issues with his stools.  He has no skin rash. His oral cavity is normal.  He has engrafted with full donor chimerism. His counts are adequate.  He has no aGvHD.  He has no infectious complications. His viral studies now including his EBV are negative.  He is on appropriate prophylaxis.  His BM studies are negative including his Onkosite (r) with no p53 mutation detected.  He has no other issues. His LDH and absolute eosinophils have decreased.  He understands his current status and precautions.  He will return to clinic in 2 weeks.  I spent a total of 25 minutes performing the above tasks. MARISABEL Mena MD

## 2025-04-09 NOTE — ASSESSMENT
[FreeTextEntry1] : 72 yo with pmhx of AML, s/p HSCT transplant on 11/12/24. Today is day +147.   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   Engraftment: ------Chimerism: 11/26/24 >97% donor/recipient not detected. ------ANC engraftment date: 11/26/24 ------Platelets engraftment date: 11/29/24, last received platelets on 11/22/24.  ------ABO Rh incompatibility issues: none  ------G-CSF support: no ------Transfusions requirements: No ------Chimerism full; monitor every other week and on marrow until d+180 ------------last performed 3/19/25 >97% donor/recipient not detected, repeat pending 4/8/25 ------Immune reconstitution due on days +180, +365   GvHD: -------Acute: No -------Chronic: N/A -------Steroid Taper: N/A -------ECP: N/A  ------Prophylaxis with ABC regimen, completed ------Reviewed GvHD signs and symptoms to report, none at this time    ID: ---Monitoring: Send CMV, Adenovirus and EBV PCR q visit until day +180 ----------CMV: negative to date ----------EBV: reactivation s/p Ruxience x4 (2/5-2/25/25); PCR now remains not quantifiable as of 3/5/25, last not detected 4/8/25 ---------Adeno: negative to date  --->Continue ppx: ---------PCP: Bactrim ---------HSV and VZV: acyclovir ---------Fungal: posaconazole d/c'd day +75 per SOP ---------CMV: letermovir until day +200 ---------SOS prophylaxis: Ursodiol d/c'd 1/7/25 per SOP ---------If chronic GvHD present, encapsulated organism coverage: N/A    Survivorship: - 25-hydroxyclaciferol (day +80): sent on 2/19/25 (20.8), not supplemented; repeat next visit - TSH (annually): - Ferritin (annually): - Fertility (annual, x2 if applicable): - Bone mineral densitometry (day +365 for women, men exposed to prolonged steroids and CNI): - Vaccinations         - Per SOP starting at day +180        - Flu at day + 90, Received on 3/19/25        - COVID per CDC guidelines   Questions and concerns addressed. Reviewed signs and symptoms to report to clinic; patient has clinic and after hours number  RTC: q2 weeks 4/22 labs, provider visit    Rajwinder Cardenas NP Adult Transplantation and Cellular Therapy Program [facility]

## 2025-04-09 NOTE — REVIEW OF SYSTEMS
[Fever] : no fever [Chills] : no chills [Night Sweats] : no night sweats [Fatigue] : no fatigue [Recent Change In Weight] : ~T no recent weight change [Eye Pain] : no eye pain [Red Eyes] : eyes not red [Dry Eyes] : no dryness of the eyes [Vision Problems] : no vision problems [Nosebleeds] : no nosebleeds [Shortness Of Breath] : no shortness of breath [Wheezing] : no wheezing [Cough] : no cough [SOB on Exertion] : no shortness of breath during exertion [Vomiting] : no vomiting [Constipation] : no constipation [Dysuria] : no dysuria [Skin Rash] : no skin rash [Skin Wound] : no skin wound [FreeTextEntry3] : intermittent itchy/watery [de-identified] : generalized dry Skin [FreeTextEntry1] : ?seasonal allergies

## 2025-04-09 NOTE — REVIEW OF SYSTEMS
[Fever] : no fever [Chills] : no chills [Night Sweats] : no night sweats [Fatigue] : no fatigue [Recent Change In Weight] : ~T no recent weight change [Eye Pain] : no eye pain [Red Eyes] : eyes not red [Dry Eyes] : no dryness of the eyes [Vision Problems] : no vision problems [Nosebleeds] : no nosebleeds [Shortness Of Breath] : no shortness of breath [Wheezing] : no wheezing [Cough] : no cough [SOB on Exertion] : no shortness of breath during exertion [Vomiting] : no vomiting [Constipation] : no constipation [Dysuria] : no dysuria [Skin Rash] : no skin rash [Skin Wound] : no skin wound [FreeTextEntry3] : intermittent itchy/watery [de-identified] : generalized dry Skin [FreeTextEntry1] : ?seasonal allergies

## 2025-04-09 NOTE — ASSESSMENT
[FreeTextEntry1] : 70 yo with pmhx of AML, s/p HSCT transplant on 11/12/24. Today is day +147.   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   Engraftment: ------Chimerism: 11/26/24 >97% donor/recipient not detected. ------ANC engraftment date: 11/26/24 ------Platelets engraftment date: 11/29/24, last received platelets on 11/22/24.  ------ABO Rh incompatibility issues: none  ------G-CSF support: no ------Transfusions requirements: No ------Chimerism full; monitor every other week and on marrow until d+180 ------------last performed 3/19/25 >97% donor/recipient not detected, repeat pending 4/8/25 ------Immune reconstitution due on days +180, +365   GvHD: -------Acute: No -------Chronic: N/A -------Steroid Taper: N/A -------ECP: N/A  ------Prophylaxis with ABC regimen, completed ------Reviewed GvHD signs and symptoms to report, none at this time    ID: ---Monitoring: Send CMV, Adenovirus and EBV PCR q visit until day +180 ----------CMV: negative to date ----------EBV: reactivation s/p Ruxience x4 (2/5-2/25/25); PCR now remains not quantifiable as of 3/5/25, last not detected 4/8/25 ---------Adeno: negative to date  --->Continue ppx: ---------PCP: Bactrim ---------HSV and VZV: acyclovir ---------Fungal: posaconazole d/c'd day +75 per SOP ---------CMV: letermovir until day +200 ---------SOS prophylaxis: Ursodiol d/c'd 1/7/25 per SOP ---------If chronic GvHD present, encapsulated organism coverage: N/A    Survivorship: - 25-hydroxyclaciferol (day +80): sent on 2/19/25 (20.8), not supplemented; repeat next visit - TSH (annually): - Ferritin (annually): - Fertility (annual, x2 if applicable): - Bone mineral densitometry (day +365 for women, men exposed to prolonged steroids and CNI): - Vaccinations         - Per SOP starting at day +180        - Flu at day + 90, Received on 3/19/25        - COVID per CDC guidelines   Questions and concerns addressed. Reviewed signs and symptoms to report to clinic; patient has clinic and after hours number  RTC: q2 weeks 4/22 labs, provider visit    Rajwinder Cardenas NP Adult Transplantation and Cellular Therapy Program [facility]

## 2025-04-09 NOTE — HISTORY OF PRESENT ILLNESS
[de-identified] : 71 year old s/p HSCT for AML    Status post allogeneic transplant, day +147 Transplant physician: Dr. Bay  Referring physician:  Diagnosis: AML  Disease staging at transplant: CR Conditioning regimen: LIV BuFluATG GvHD prophylaxis: PTCyBorAba (ABC) on study  Donor type: MUD                    Mismatches: N/A, 10/10                    Stem cell source: Peripheral  ABO         Recipient: A positive         Donor: O positive          Incompatibility: Minor  CMV Status  Recipient: Positive  Donor: Negative Toxoplasmosis Status  Recipient: Negative   Donor: Negative    PMH:  BPH, AML     -----------------Oncologic Hx:-------------------------------- He had been well, exercising regularly, three miles/day. By early 7/2024, he had developed NGUYEN on climbing stairs. Pancytopenia with circulating blasts were detected and he was admitted to [facility] 7/18/2024. 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 He was treated with decitabine and venetoclax x 3 cycles.    -----------Transplant hospital course:---------------------  Tolerated well, c/b increased BP due to post transplant cytoxan fluids. Additionally c/b hematuria, interstitial mucositis.  -----Day 0 = 11/12/24  --------------Post Transplant Course:-----------------------   -------------Oncological treatments:-------------------------- Decitabine and venetoclax 100mg 21d   ---------------Bone Marrow Bx Results:---------------------- Bone Marrow Biopsy, Clot and Bone Marrow Aspirate, Right PIC -MDS/AML with mutated TP53 ( per International Consensus  Classification of Myeloid Neoplasms and Acute Leukemias) -MDS with biallelic TP53 inactivation (WHO-GIMN0ce ed). Hypercellular marrow for age with multilineage dysplasia, and increased blasts/immature cells (12% on aspirate differential count, 10-15% by CD34 IHC stain). -Abnormal Karyotype 46-47,XY,del(7)(q11.2),+8,del(8)(q13q22)x2,del(9)(q13q22),-11,-12, add(17)(p11.2),del(20)(q11.2q13.3),+1-2mar                  {cp13                                                    }/46,XY                  {7                                                    } -FISH studies detected 7q deletion (60%), Trisomy 8 (62.5%), 20q deletion (59.5%) and TP53 deletion (55.5%). -OnkoSit Advanced NGS Myeloid Report detected (Tier I) TP53 p.Via257Vhe (VAF38%) and U2AF1 p.Kza323Peo (VAF32%). There are increased blasts/immature cells (12% on aspirate differential count and approximately 10-15% of the cellularity by CD34 immunohistochemical stain).  8/15/24:  Bone marrow biopsy and bone marrow aspirate  - Cellular marrow (30-40%) with markedly decreased myelopoiesis, decreased left shifted erythropoiesis and relatively normal megakaryopoiesis with no increase in blast population  - Persistent 7q deletion (1%), trisomy 8 (2.5%), 20q deletion (2.5%) and TP53 deletion (0.5%) by FISH studies, below the cut-off values  _ Persistent mutations in TP53 p.Gsq785Pra and U2AF1 p.Ysi095Ufm at low VAF levels  10/15/24 (Pretransplant BmBx) ----Morphology: Normocellular bone marrow with trilineage regeneration (focal myeloid predominant, focal erythroid predominant, overall decreased megakaryocytes with focally normal numbers and normal morphology, and increased iron stores (history of MDS/AML with complex karyotype and TP53 deletion, under treatment)      - No morphologic or immunophenotypic findings of bone marrow involvement by leukemia are identified ----Karyotype: Normal ----FISH: deletion of TP53 below cut off value  ----NGS: Normal    - Day 30 post transplant: completed 12/12/24  ----Morphology:SANJEEV ----Karyotype: //46,XX[20] ----FISH: Normal FISH ----NGS: Onkosight Advanced NGS Myeloid Panel is normal. No mutations identified.    - Day 100 post transplant: completed 2/19/25 ----Morphology: Trilineage hematopoiesis with maturation and increased iron stores. No morphologic or immunophenotypic features of bone marrow involvement by MDS/AML are identified. ----Karyotype:46,XX[20] ----FISH: Normal Fish ----NGS:Workable NGS panel- no mutations identified.   - Day 180 post transplant: Due 5/12/25 ----Morphology: ----Karyotype ----FISH ----NGS:   ---------Past Surgical Hx---------- Partial mastoidectomy and requires periodic left mastoid debridement  -------- -Social Hx ----------------- The patient is  and has 2 daughters, 30 and 27y/o local in [place]  Patient has one brother (69)  Work: Nephrologist  Born in: [place]  Never a smoker/Social alcohol use  ----------Past Family Hx:------------ Noncontributory  [FreeTextEntry1] : Phase I-II Study of High-Dose Post-Transplant Cyclophosphamide, Bortezomib, and Abatacept for the Prevention of Wuann-ntvewl-Xbzg Disease (GvHD) following Allogeneic Hematopoietic Stem Cell Transplantation (HSCT) [de-identified] : 4/8/25: Day + 147 post-transplant. Energy and appetite better. Walking daily. Endorses itchy, watery eyes possibly r/t allergies. Has not tried artificial tears. Denies gritty sensation or vision change.Reports ongoing generalized xeroderma that is gradually improving. Otherwise no complaints, feeling very well. Denies fever/chills, headache, chest pain, palpitations, SOB, cough, N/V/D, abd pain, swelling, rash, difficulty swallowing, stiff joints, or pain. Meds reviewed, taking appropriately.

## 2025-04-09 NOTE — HISTORY OF PRESENT ILLNESS
[de-identified] : 71 year old s/p HSCT for AML    Status post allogeneic transplant, day +147 Transplant physician: Dr. Bay  Referring physician:  Diagnosis: AML  Disease staging at transplant: CR Conditioning regimen: LIV BuFluATG GvHD prophylaxis: PTCyBorAba (ABC) on study  Donor type: MUD                    Mismatches: N/A, 10/10                    Stem cell source: Peripheral  ABO         Recipient: A positive         Donor: O positive          Incompatibility: Minor  CMV Status  Recipient: Positive  Donor: Negative Toxoplasmosis Status  Recipient: Negative   Donor: Negative    PMH:  BPH, AML     -----------------Oncologic Hx:-------------------------------- He had been well, exercising regularly, three miles/day. By early 7/2024, he had developed NGUYEN on climbing stairs. Pancytopenia with circulating blasts were detected and he was admitted to [facility] 7/18/2024. 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 He was treated with decitabine and venetoclax x 3 cycles.    -----------Transplant hospital course:---------------------  Tolerated well, c/b increased BP due to post transplant cytoxan fluids. Additionally c/b hematuria, interstitial mucositis.  -----Day 0 = 11/12/24  --------------Post Transplant Course:-----------------------   -------------Oncological treatments:-------------------------- Decitabine and venetoclax 100mg 21d   ---------------Bone Marrow Bx Results:---------------------- Bone Marrow Biopsy, Clot and Bone Marrow Aspirate, Right PIC -MDS/AML with mutated TP53 ( per International Consensus  Classification of Myeloid Neoplasms and Acute Leukemias) -MDS with biallelic TP53 inactivation (WHO-RGWW0bk ed). Hypercellular marrow for age with multilineage dysplasia, and increased blasts/immature cells (12% on aspirate differential count, 10-15% by CD34 IHC stain). -Abnormal Karyotype 46-47,XY,del(7)(q11.2),+8,del(8)(q13q22)x2,del(9)(q13q22),-11,-12, add(17)(p11.2),del(20)(q11.2q13.3),+1-2mar                  {cp13                                                    }/46,XY                  {7                                                    } -FISH studies detected 7q deletion (60%), Trisomy 8 (62.5%), 20q deletion (59.5%) and TP53 deletion (55.5%). -OnkoSit Advanced NGS Myeloid Report detected (Tier I) TP53 p.[id number] (VAF38%) and U2AF1 p.Ygl962Bdj (VAF32%). There are increased blasts/immature cells (12% on aspirate differential count and approximately 10-15% of the cellularity by CD34 immunohistochemical stain).  8/15/24:  Bone marrow biopsy and bone marrow aspirate  - Cellular marrow (30-40%) with markedly decreased myelopoiesis, decreased left shifted erythropoiesis and relatively normal megakaryopoiesis with no increase in blast population  - Persistent 7q deletion (1%), trisomy 8 (2.5%), 20q deletion (2.5%) and TP53 deletion (0.5%) by FISH studies, below the cut-off values  _ Persistent mutations in TP53 p.Sjy345Kyo and U2AF1 p.Kdb108Ipx at low VAF levels  10/15/24 (Pretransplant BmBx) ----Morphology: Normocellular bone marrow with trilineage regeneration (focal myeloid predominant, focal erythroid predominant, overall decreased megakaryocytes with focally normal numbers and normal morphology, and increased iron stores (history of MDS/AML with complex karyotype and TP53 deletion, under treatment)      - No morphologic or immunophenotypic findings of bone marrow involvement by leukemia are identified ----Karyotype: Normal ----FISH: deletion of TP53 below cut off value  ----NGS: Normal    - Day 30 post transplant: completed 12/12/24  ----Morphology:SANJEEV ----Karyotype: //46,XX[20] ----FISH: Normal FISH ----NGS: Onkosight Advanced NGS Myeloid Panel is normal. No mutations identified.    - Day 100 post transplant: completed 2/19/25 ----Morphology: Trilineage hematopoiesis with maturation and increased iron stores. No morphologic or immunophenotypic features of bone marrow involvement by MDS/AML are identified. ----Karyotype:46,XX[20] ----FISH: Normal Fish ----NGS:Copytele NGS panel- no mutations identified.   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 Energy and appetite better. Walking daily. Endorses itchy, watery eyes possibly r/t allergies. Has not tried artificial tears. Denies gritty sensation or vision change.Reports ongoing generalized xeroderma that is gradually improving. Otherwise no complaints, feeling very well. Denies fever/chills, headache, chest pain, palpitations, SOB, cough, N/V/D, abd pain, swelling, rash, difficulty swallowing, stiff joints, or pain. Meds reviewed, taking appropriately.

## 2025-04-09 NOTE — PHYSICAL EXAM
[Thin] : thin [Normal] : PERRL, EOMI, no conjunctival infection, anicteric [de-identified] : generalized xeroderma

## 2025-04-24 NOTE — ASSESSMENT
[FreeTextEntry1] : 70 yo with pmhx of AML, s/p HSCT transplant on 11/12/24. Today is day +162.   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   Engraftment: ------Chimerism: 11/26/24 >97% donor/recipient not detected. ------ANC engraftment date: 11/26/24 ------Platelets engraftment date: 11/29/24, last received platelets on 11/22/24.  ------ABO Rh incompatibility issues: none  ------G-CSF support: no ------Transfusions requirements: No ------Chimerism full; monitor every other week and on marrow until d+180 ------------last performed 3/19/25 >97% donor/recipient not detected, 4/8/25 >97% donor/recipient not detected.  ------Immune reconstitution due on days +180, +365   GvHD: -------Acute: No -------Chronic: N/A -------Steroid Taper: N/A -------ECP: N/A  ------Prophylaxis with ABC regimen, completed ------Reviewed GvHD signs and symptoms to report, none at this time    ID: ---Monitoring: Send CMV, Adenovirus and EBV PCR q visit until day +180 ----------CMV: negative to date ----------EBV: reactivation s/p Ruxience x4 (2/5-2/25/25); PCR now remains not quantifiable as of 3/5/25, last not detected 4/8/25 ---------Adeno: negative to date  --->Continue ppx: ---------PCP: Bactrim ---------HSV and VZV: acyclovir ---------Fungal: posaconazole d/c'd day +75 per SOP ---------CMV: letermovir until day +200 ---------SOS prophylaxis: Ursodiol d/c'd 1/7/25 per SOP ---------If chronic GvHD present, encapsulated organism coverage: N/A    Survivorship: - 25-hydroxyclaciferol (day +80): sent on 2/19/25 (20.8), not supplemented; Sent on 4/23/25 (20.9) - TSH (annually): - Ferritin (annually): - Fertility (annual, x2 if applicable): - Bone mineral densitometry (day +365 for women, men exposed to prolonged steroids and CNI): - Vaccinations         - Per SOP starting at day +180        - Flu at day + 90, Received on 3/19/25        - COVID per CDC guidelines   Questions and concerns addressed. Reviewed signs and symptoms to report to clinic; patient has clinic and after hours number  RTC: q2 weeks 5/6/25 labs, provider visit, bone marrow biopsy    Linette Houston NP Adult Transplantation and Cellular Therapy Program [facility]    I saw the patient on day +162 following his allogeneic HSCT. I reviewed the data and the above note.  Diagnosis: AML Disease staging at transplant: CR Conditioning regimen: LIV Bu Flu rATG GvHD prophylaxis: ABC Donor type: MUD  Stem cell source: Peripheral ABO  Recipient: A positive  Donor: O positive  Incompatibility: Minor CMV Status  Recipient: Positive  Donor: Negative Toxoplasmosis Status  Recipient: Negative  Donor: Negative He has been doing well with no complaints. He has no skin rash. His oral cavity is normal. He has discolored nails.  He has engrafted with full donor chimerism. His counts are adequate. He has no aGvHD. He has no infectious complications. His viral studies now including his EBV are negative. He is on appropriate prophylaxis. He is no CCR.  He has no other issues.  He understands his current status and precautions. He will return to clinic in 2 weeks. He will have repeat BM studies.  I spent a total of 25 minutes performing the above tasks. MARISABEL Mena MD.

## 2025-04-24 NOTE — PHYSICAL EXAM
[Restricted in physically strenuous activity but ambulatory and able to carry out work of a light or sedentary nature] : Status 1- Restricted in physically strenuous activity but ambulatory and able to carry out work of a light or sedentary nature, e.g., light house work, office work [Thin] : thin [Normal] : affect appropriate [de-identified] : generalized xeroderma

## 2025-04-24 NOTE — HISTORY OF PRESENT ILLNESS
[Research Protocol] : Research Protocol  [de-identified] : 71 year old s/p HSCT for AML    Status post allogeneic transplant, day +162 Transplant physician: Dr. Bay  Referring physician:  Diagnosis: AML  Disease staging at transplant: CR Conditioning regimen: LIV BuFluATG GvHD prophylaxis: PTCyBorAba (ABC) on study  Donor type: MUD                    Mismatches: N/A, 10/10                    Stem cell source: Peripheral  ABO         Recipient: A positive         Donor: O positive          Incompatibility: Minor  CMV Status  Recipient: Positive  Donor: Negative Toxoplasmosis Status  Recipient: Negative   Donor: Negative    PMH:  BPH, AML     -----------------Oncologic Hx:-------------------------------- He had been well, exercising regularly, three miles/day. By early 7/2024, he had developed NGUYEN on climbing stairs. Pancytopenia with circulating blasts were detected and he was admitted to [facility] 7/18/2024. 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 He was treated with decitabine and venetoclax x 3 cycles.    -----------Transplant hospital course:---------------------  Tolerated well, c/b increased BP due to post transplant cytoxan fluids. Additionally c/b hematuria, interstitial mucositis.  -----Day 0 = 11/12/24  --------------Post Transplant Course:-----------------------   -------------Oncological treatments:-------------------------- Decitabine and venetoclax 100mg 21d   ---------------Bone Marrow Bx Results:---------------------- Bone Marrow Biopsy, Clot and Bone Marrow Aspirate, Right PIC -MDS/AML with mutated TP53 ( per International Consensus  Classification of Myeloid Neoplasms and Acute Leukemias) -MDS with biallelic TP53 inactivation (WHO-XODY4oh ed). Hypercellular marrow for age with multilineage dysplasia, and increased blasts/immature cells (12% on aspirate differential count, 10-15% by CD34 IHC stain). -Abnormal Karyotype 46-47,XY,del(7)(q11.2),+8,del(8)(q13q22)x2,del(9)(q13q22),-11,-12, add(17)(p11.2),del(20)(q11.2q13.3),+1-2mar                  {cp13                                                    \\}/46,XY                  {7                                                    \\} -FISH studies detected 7q deletion (60%), Trisomy 8 (62.5%), 20q deletion (59.5%) and TP53 deletion (55.5%). -OnkoSit Advanced NGS Myeloid Report detected (Tier I) TP53 p.Neu875Vxz (VAF38%) and U2AF1 p.Rpy163Cws (VAF32%). There are increased blasts/immature cells (12% on aspirate differential count and approximately 10-15% of the cellularity by CD34 immunohistochemical stain).  8/15/24:  Bone marrow biopsy and bone marrow aspirate  - Cellular marrow (30-40%) with markedly decreased myelopoiesis, decreased left shifted erythropoiesis and relatively normal megakaryopoiesis with no increase in blast population  - Persistent 7q deletion (1%), trisomy 8 (2.5%), 20q deletion (2.5%) and TP53 deletion (0.5%) by FISH studies, below the cut-off values  _ Persistent mutations in TP53 p.Iip678Mpc and U2AF1 p.Gjn217Dgp at low VAF levels  10/15/24 (Pretransplant BmBx) ----Morphology: Normocellular bone marrow with trilineage regeneration (focal myeloid predominant, focal erythroid predominant, overall decreased megakaryocytes with focally normal numbers and normal morphology, and increased iron stores (history of MDS/AML with complex karyotype and TP53 deletion, under treatment)      - No morphologic or immunophenotypic findings of bone marrow involvement by leukemia are identified ----Karyotype: Normal ----FISH: deletion of TP53 below cut off value  ----NGS: Normal    - Day 30 post transplant: completed 12/12/24  ----Morphology:SANJEEV ----Karyotype: //46,XX[20] ----FISH: Normal FISH ----NGS: On[employer] Advanced NGS Myeloid Panel is normal. No mutations identified.    - Day 100 post transplant: completed 2/19/25 ----Morphology: Trilineage hematopoiesis with maturation and increased iron stores. No morphologic or immunophenotypic features of bone marrow involvement by MDS/AML are identified. ----Karyotype:46,XX[20] ----FISH: Normal Fish ----NGS:On[employer] NGS panel- no mutations identified.   - Day 180 post transplant: Due 5/12/25 ----Morphology: ----Karyotype ----FISH ----NGS:   ---------Past Surgical Hx---------- Partial mastoidectomy and requires periodic left mastoid debridement  -------- -Social Hx ----------------- The patient is  and has 2 daughters, 30 and 27y/o local in [place]  Patient has one brother (69)  Work: Nephrologist  Born in: [place]  Never a smoker/Social alcohol use  ----------Past Family Hx:------------ Noncontributory  [FreeTextEntry1] : Phase I-II Study of High-Dose Post-Transplant Cyclophosphamide, Bortezomib, and Abatacept for the Prevention of Baitg-zbsgmj-Nzbj Disease (GvHD) following Allogeneic Hematopoietic Stem Cell Transplantation (HSCT) [de-identified] : 4/23/25: Day + 162 post transplant. Overall, well and offers no acute concerns. Cough has resolved and reports energy level is great. Denies fever/chills, headache, chest pain, palpitations, SOB, cough, N/V/D, abd pain, swelling, rash, difficulty swallowing, stiff joints, or pain. Meds reviewed, taking appropriately.

## 2025-04-24 NOTE — REVIEW OF SYSTEMS
[Negative] : Heme/Lymph [FreeTextEntry3] : intermittent itchy/watery [FreeTextEntry1] : ?seasonal allergies [Fever] : no fever [Chills] : no chills [Night Sweats] : no night sweats [Fatigue] : no fatigue [Recent Change In Weight] : ~T no recent weight change [Eye Pain] : no eye pain [Red Eyes] : eyes not red [Dry Eyes] : no dryness of the eyes [Vision Problems] : no vision problems [Nosebleeds] : no nosebleeds [Shortness Of Breath] : no shortness of breath [Wheezing] : no wheezing [Cough] : no cough [SOB on Exertion] : no shortness of breath during exertion [Vomiting] : no vomiting [Constipation] : no constipation [Dysuria] : no dysuria [Skin Rash] : no skin rash [Skin Wound] : no skin wound [de-identified] : generalized dry Skin

## 2025-04-24 NOTE — HISTORY OF PRESENT ILLNESS
[Research Protocol] : Research Protocol  [de-identified] : 71 year old s/p HSCT for AML    Status post allogeneic transplant, day +162 Transplant physician: Dr. Bay  Referring physician:  Diagnosis: AML  Disease staging at transplant: CR Conditioning regimen: LIV BuFluATG GvHD prophylaxis: PTCyBorAba (ABC) on study  Donor type: MUD                    Mismatches: N/A, 10/10                    Stem cell source: Peripheral  ABO         Recipient: A positive         Donor: O positive          Incompatibility: Minor  CMV Status  Recipient: Positive  Donor: Negative Toxoplasmosis Status  Recipient: Negative   Donor: Negative    PMH:  BPH, AML     -----------------Oncologic Hx:-------------------------------- He had been well, exercising regularly, three miles/day. By early 7/2024, he had developed NGUYEN on climbing stairs. Pancytopenia with circulating blasts were detected and he was admitted to [facility] 7/18/2024. 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 He was treated with decitabine and venetoclax x 3 cycles.    -----------Transplant hospital course:---------------------  Tolerated well, c/b increased BP due to post transplant cytoxan fluids. Additionally c/b hematuria, interstitial mucositis.  -----Day 0 = 11/12/24  --------------Post Transplant Course:-----------------------   -------------Oncological treatments:-------------------------- Decitabine and venetoclax 100mg 21d   ---------------Bone Marrow Bx Results:---------------------- Bone Marrow Biopsy, Clot and Bone Marrow Aspirate, Right PIC -MDS/AML with mutated TP53 ( per International Consensus  Classification of Myeloid Neoplasms and Acute Leukemias) -MDS with biallelic TP53 inactivation (WHO-FYDK1qu ed). Hypercellular marrow for age with multilineage dysplasia, and increased blasts/immature cells (12% on aspirate differential count, 10-15% by CD34 IHC stain). -Abnormal Karyotype 46-47,XY,del(7)(q11.2),+8,del(8)(q13q22)x2,del(9)(q13q22),-11,-12, add(17)(p11.2),del(20)(q11.2q13.3),+1-2mar                  {cp13                                                    \\}/46,XY                  {7                                                    \\} -FISH studies detected 7q deletion (60%), Trisomy 8 (62.5%), 20q deletion (59.5%) and TP53 deletion (55.5%). -OnkoSit Advanced NGS Myeloid Report detected (Tier I) TP53 p.Eqi060Ftf (VAF38%) and U2AF1 p.Mjb179Slj (VAF32%). There are increased blasts/immature cells (12% on aspirate differential count and approximately 10-15% of the cellularity by CD34 immunohistochemical stain).  8/15/24:  Bone marrow biopsy and bone marrow aspirate  - Cellular marrow (30-40%) with markedly decreased myelopoiesis, decreased left shifted erythropoiesis and relatively normal megakaryopoiesis with no increase in blast population  - Persistent 7q deletion (1%), trisomy 8 (2.5%), 20q deletion (2.5%) and TP53 deletion (0.5%) by FISH studies, below the cut-off values  _ Persistent mutations in TP53 p.Yva062Lkw and U2AF1 p.Pcp696Mvb at low VAF levels  10/15/24 (Pretransplant BmBx) ----Morphology: Normocellular bone marrow with trilineage regeneration (focal myeloid predominant, focal erythroid predominant, overall decreased megakaryocytes with focally normal numbers and normal morphology, and increased iron stores (history of MDS/AML with complex karyotype and TP53 deletion, under treatment)      - No morphologic or immunophenotypic findings of bone marrow involvement by leukemia are identified ----Karyotype: Normal ----FISH: deletion of TP53 below cut off value  ----NGS: Normal    - Day 30 post transplant: completed 12/12/24  ----Morphology:SANJEEV ----Karyotype: //46,XX[20] ----FISH: Normal FISH ----NGS: Onk[employer]ight Advanced NGS Myeloid Panel is normal. No mutations identified.    - Day 100 post transplant: completed 2/19/25 ----Morphology: Trilineage hematopoiesis with maturation and increased iron stores. No morphologic or immunophenotypic features of bone marrow involvement by MDS/AML are identified. ----Karyotype:46,XX[20] ----FISH: Normal Fish ----NGS:[employer] NGS panel- no mutations identified.   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 Overall, well and offers no acute concerns. Cough has resolved and reports energy level is great. Denies fever/chills, headache, chest pain, palpitations, SOB, cough, N/V/D, abd pain, swelling, rash, difficulty swallowing, stiff joints, or pain. Meds reviewed, taking appropriately.

## 2025-04-24 NOTE — REVIEW OF SYSTEMS
[Negative] : Heme/Lymph [FreeTextEntry3] : intermittent itchy/watery [FreeTextEntry1] : ?seasonal allergies [Fever] : no fever [Chills] : no chills [Night Sweats] : no night sweats [Fatigue] : no fatigue [Recent Change In Weight] : ~T no recent weight change [Eye Pain] : no eye pain [Red Eyes] : eyes not red [Dry Eyes] : no dryness of the eyes [Vision Problems] : no vision problems [Nosebleeds] : no nosebleeds [Shortness Of Breath] : no shortness of breath [Wheezing] : no wheezing [Cough] : no cough [SOB on Exertion] : no shortness of breath during exertion [Vomiting] : no vomiting [Constipation] : no constipation [Dysuria] : no dysuria [Skin Rash] : no skin rash [Skin Wound] : no skin wound [de-identified] : generalized dry Skin

## 2025-04-24 NOTE — PHYSICAL EXAM
[Restricted in physically strenuous activity but ambulatory and able to carry out work of a light or sedentary nature] : Status 1- Restricted in physically strenuous activity but ambulatory and able to carry out work of a light or sedentary nature, e.g., light house work, office work [Thin] : thin [Normal] : affect appropriate [de-identified] : generalized xeroderma

## 2025-04-24 NOTE — ASSESSMENT
[FreeTextEntry1] : 72 yo with pmhx of AML, s/p HSCT transplant on 11/12/24. Today is day +162.   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   Engraftment: ------Chimerism: 11/26/24 >97% donor/recipient not detected. ------ANC engraftment date: 11/26/24 ------Platelets engraftment date: 11/29/24, last received platelets on 11/22/24.  ------ABO Rh incompatibility issues: none  ------G-CSF support: no ------Transfusions requirements: No ------Chimerism full; monitor every other week and on marrow until d+180 ------------last performed 3/19/25 >97% donor/recipient not detected, 4/8/25 >97% donor/recipient not detected.  ------Immune reconstitution due on days +180, +365   GvHD: -------Acute: No -------Chronic: N/A -------Steroid Taper: N/A -------ECP: N/A  ------Prophylaxis with ABC regimen, completed ------Reviewed GvHD signs and symptoms to report, none at this time    ID: ---Monitoring: Send CMV, Adenovirus and EBV PCR q visit until day +180 ----------CMV: negative to date ----------EBV: reactivation s/p Ruxience x4 (2/5-2/25/25); PCR now remains not quantifiable as of 3/5/25, last not detected 4/8/25 ---------Adeno: negative to date  --->Continue ppx: ---------PCP: Bactrim ---------HSV and VZV: acyclovir ---------Fungal: posaconazole d/c'd day +75 per SOP ---------CMV: letermovir until day +200 ---------SOS prophylaxis: Ursodiol d/c'd 1/7/25 per SOP ---------If chronic GvHD present, encapsulated organism coverage: N/A    Survivorship: - 25-hydroxyclaciferol (day +80): sent on 2/19/25 (20.8), not supplemented; Sent on 4/23/25 (20.9) - TSH (annually): - Ferritin (annually): - Fertility (annual, x2 if applicable): - Bone mineral densitometry (day +365 for women, men exposed to prolonged steroids and CNI): - Vaccinations         - Per SOP starting at day +180        - Flu at day + 90, Received on 3/19/25        - COVID per CDC guidelines   Questions and concerns addressed. Reviewed signs and symptoms to report to clinic; patient has clinic and after hours number  RTC: q2 weeks 5/6/25 labs, provider visit, bone marrow biopsy    Linette Houston NP Adult Transplantation and Cellular Therapy Program [facility]    I saw the patient on day +162 following his allogeneic HSCT. I reviewed the data and the above note.  Diagnosis: AML Disease staging at transplant: CR Conditioning regimen: LIV Bu Flu rATG GvHD prophylaxis: ABC Donor type: MUD  Stem cell source: Peripheral ABO  Recipient: A positive  Donor: O positive  Incompatibility: Minor CMV Status  Recipient: Positive  Donor: Negative Toxoplasmosis Status  Recipient: Negative  Donor: Negative He has been doing well with no complaints. He has no skin rash. His oral cavity is normal. He has discolored nails.  He has engrafted with full donor chimerism. His counts are adequate. He has no aGvHD. He has no infectious complications. His viral studies now including his EBV are negative. He is on appropriate prophylaxis. He is no CCR.  He has no other issues.  He understands his current status and precautions. He will return to clinic in 2 weeks. He will have repeat BM studies.  I spent a total of 25 minutes performing the above tasks. MARISABEL Mena MD.

## 2025-05-06 NOTE — PROCEDURE
[Bone Marrow Biopsy] : bone marrow biopsy [Bone Marrow Aspiration] : bone marrow aspiration  [Patient] : the patient [Verbal Consent Obtained] : verbal consent was obtained prior to the procedure [Patient identification verified] : patient identification verified [Procedure verified and consent obtained] : procedure verified and consent obtained [Laterality verified and correct site marked] : laterality verified and correct site marked [Left] : site: left [Correct positioning] : correct positioning [Prone] : prone [The left posterior iliac crest was prepped with betadine and draped, using sterile technique.] : The left posterior iliac crest was prepped with betadine and draped, using sterile technique. [Aspirate] : aspirate [Cytogenetics] : cytogenetics [FISH] : FISH [Biopsy] : biopsy [Flow Cytometry] : flow cytometry [] : The patient was instructed to remove the bandage the following AM. The patient may bathe. Acetaminophen may be taken for discomfort, as per package directions.If there are any other problems, the patient was instructed to call the office. The patient verbalized understanding, and is aware of the office contact numbers. [FreeTextEntry1] : hx allogeneic stem cell transplant; day +180 bone marrow biopsy  [FreeTextEntry2] : Patient received 10cc 2% lidocaine to left iliac crest. Underwent bone marrow biopsy and aspiration with no complications. Pressure applied to site, no s/sx of infection, bleeding post procedure. Pt knows to keep site clean for 24 hours post procedure.

## 2025-05-06 NOTE — PHYSICAL EXAM
[Restricted in physically strenuous activity but ambulatory and able to carry out work of a light or sedentary nature] : Status 1- Restricted in physically strenuous activity but ambulatory and able to carry out work of a light or sedentary nature, e.g., light house work, office work [Thin] : thin [Normal] : affect appropriate [de-identified] : generalized xeroderma

## 2025-05-06 NOTE — REVIEW OF SYSTEMS
[Negative] : Integumentary [Fever] : no fever [Chills] : no chills [Night Sweats] : no night sweats [Fatigue] : no fatigue [Recent Change In Weight] : ~T no recent weight change [Eye Pain] : no eye pain [Red Eyes] : eyes not red [Dry Eyes] : no dryness of the eyes [Vision Problems] : no vision problems [Nosebleeds] : no nosebleeds [Shortness Of Breath] : no shortness of breath [Wheezing] : no wheezing [Cough] : no cough [SOB on Exertion] : no shortness of breath during exertion [Vomiting] : no vomiting [Constipation] : no constipation [Dysuria] : no dysuria [Skin Rash] : no skin rash [Skin Wound] : no skin wound

## 2025-05-06 NOTE — ASSESSMENT
[FreeTextEntry1] : 70 yo with pmhx of AML, s/p HSCT transplant on 11/12/24. Today is day +176.   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   Engraftment: ------Chimerism: 11/26/24 >97% donor/recipient not detected. ------ANC engraftment date: 11/26/24 ------Platelets engraftment date: 11/29/24, last received platelets on 11/22/24.  ------ABO Rh incompatibility issues: none  ------G-CSF support: no ------Transfusions requirements: No ------Chimerism full; monitor every other week and on marrow until d+180 ------------last performed 3/19/25 >97% donor/recipient not detected, 4/8/25 >97% donor/recipient not detected.  ------Immune reconstitution due on days +365   GvHD: -------Acute: No -------Chronic: N/A -------Steroid Taper: N/A -------ECP: N/A  ------Prophylaxis with ABC regimen, completed ------Reviewed GvHD signs and symptoms to report, none at this time    ID: ---Monitoring: Send CMV, Adenovirus and EBV PCR q visit until day +180 ----------CMV: negative to date ----------EBV: reactivation s/p Ruxience x4 (2/5-2/25/25); PCR now remains not quantifiable as of 3/5/25, last not detected 4/23/25 ---------Adeno: negative to date  --->Continue ppx: ---------PCP: Bactrim ---------HSV and VZV: acyclovir ---------Fungal: posaconazole d/c'd day +75 per SOP ---------CMV: letermovir until day +200 ---------SOS prophylaxis: Ursodiol d/c'd 1/7/25 per SOP ---------If chronic GvHD present, encapsulated organism coverage: N/A   Survivorship: - 25-hydroxyclaciferol (day +80): sent on 2/19/25 (20.8), not supplemented; Sent on 4/23/25 (20.9) - TSH (annually): - Ferritin (annually): - Fertility (annual, x2 if applicable): - Bone mineral densitometry (day +365 for women, men exposed to prolonged steroids and CNI): - Vaccinations         - Per SOP starting at day +180 - plan to start 5/20         - Flu at day + 90, Received on 3/19/25        - COVID per CDC guidelines   Questions and concerns addressed. Reviewed signs and symptoms to report to clinic; patient has clinic and after hours number  RTC: q2 weeks 5/20 labs, provider visit, initiate BMT vaccinations   Omaira Cooper NP Adult Transplantation and Cellular Therapy Program [facility]

## 2025-05-06 NOTE — HISTORY OF PRESENT ILLNESS
[Research Protocol] : Research Protocol  [de-identified] : 71 year old s/p HSCT for AML    Status post allogeneic transplant, day +176 Transplant physician: Dr. Bay  Referring physician:  Diagnosis: AML  Disease staging at transplant: CR Conditioning regimen: LIV BuFluATG GvHD prophylaxis: PTCyBorAba (ABC) on study  Donor type: MUD                    Mismatches: N/A, 10/10                    Stem cell source: Peripheral  ABO         Recipient: A positive         Donor: O positive          Incompatibility: Minor  CMV Status  Recipient: Positive  Donor: Negative Toxoplasmosis Status  Recipient: Negative   Donor: Negative    PMH:  BPH, AML     -----------------Oncologic Hx:-------------------------------- He had been well, exercising regularly, three miles/day. By early 7/2024, he had developed NGUYEN on climbing stairs. Pancytopenia with circulating blasts were detected and he was admitted to [facility] 7/18/2024. 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 He was treated with decitabine and venetoclax x 3 cycles.    -----------Transplant hospital course:---------------------  Tolerated well, c/b increased BP due to post transplant cytoxan fluids. Additionally c/b hematuria, interstitial mucositis.  -----Day 0 = 11/12/24  --------------Post Transplant Course:-----------------------   -------------Oncological treatments:-------------------------- Decitabine and venetoclax 100mg 21d   ---------------Bone Marrow Bx Results:---------------------- Bone Marrow Biopsy, Clot and Bone Marrow Aspirate, Right PIC -MDS/AML with mutated TP53 ( per International Consensus  Classification of Myeloid Neoplasms and Acute Leukemias) -MDS with biallelic TP53 inactivation (WHO-CEHW7ag ed). Hypercellular marrow for age with multilineage dysplasia, and increased blasts/immature cells (12% on aspirate differential count, 10-15% by CD34 IHC stain). -Abnormal Karyotype 46-47,XY,del(7)(q11.2),+8,del(8)(q13q22)x2,del(9)(q13q22),-11,-12, add(17)(p11.2),del(20)(q11.2q13.3),+1-2mar                   {cp13                                                        \}/46,XY                   {7                                                        \} -FISH studies detected 7q deletion (60%), Trisomy 8 (62.5%), 20q deletion (59.5%) and TP53 deletion (55.5%). -Onko[employer] Advanced NGS Myeloid Report detected (Tier I) TP53 p.Hvj290Hhw (VAF38%) and U2AF1 p.Ial850Pbq (VAF32%). There are increased blasts/immature cells (12% on aspirate differential count and approximately 10-15% of the cellularity by CD34 immunohistochemical stain).  8/15/24:  Bone marrow biopsy and bone marrow aspirate  - Cellular marrow (30-40%) with markedly decreased myelopoiesis, decreased left shifted erythropoiesis and relatively normal megakaryopoiesis with no increase in blast population  - Persistent 7q deletion (1%), trisomy 8 (2.5%), 20q deletion (2.5%) and TP53 deletion (0.5%) by FISH studies, below the cut-off values  _ Persistent mutations in TP53 p.Spw219Hxg and U2AF1 p.Wmm863Ijp at low VAF levels  10/15/24 (Pretransplant BmBx) ----Morphology: Normocellular bone marrow with trilineage regeneration (focal myeloid predominant, focal erythroid predominant, overall decreased megakaryocytes with focally normal numbers and normal morphology, and increased iron stores (history of MDS/AML with complex karyotype and TP53 deletion, under treatment)      - No morphologic or immunophenotypic findings of bone marrow involvement by leukemia are identified ----Karyotype: Normal ----FISH: deletion of TP53 below cut off value  ----NGS: Normal    - Day 30 post transplant: completed 12/12/24  ----Morphology:SANJEEV ----Karyotype: //46,XX[20] ----FISH: Normal FISH ----NGS: [employer] Advanced NGS Myeloid Panel is normal. No mutations identified.    - Day 100 post transplant: completed 2/19/25 ----Morphology: Trilineage hematopoiesis with maturation and increased iron stores. No morphologic or immunophenotypic features of bone marrow involvement by MDS/AML are identified. ----Karyotype:46,XX[20] ----FISH: Normal Fish ----NGS:[email or web address] NGS panel- no mutations identified.   - Day 180 post transplant: Due 5/12/25 ----Morphology: ----Karyotype ----FISH ----NGS:   ---------Past Surgical Hx---------- Partial mastoidectomy and requires periodic left mastoid debridement  -------- -Social Hx ----------------- The patient is  and has 2 daughters, 30 and 27y/o local in [place]  Patient has one brother (69)  Work: Nephrologist  Born in: [place]  Never a smoker/Social alcohol use  ----------Past Family Hx:------------ Noncontributory  [FreeTextEntry1] : Phase I-II Study of High-Dose Post-Transplant Cyclophosphamide, Bortezomib, and Abatacept for the Prevention of Wtchn-wqzivu-Lian Disease (GvHD) following Allogeneic Hematopoietic Stem Cell Transplantation (HSCT) [de-identified] : 5/6/25: Day + 176 post transplant. Overall, well and offers no acute concerns. Day +180 biopsy performed in clinic today. Energy level has improved, exercises regularly and increasing resistance training.  Denies fever/chills, headache, chest pain, palpitations, SOB, cough, N/V/D, abd pain, swelling, rash, difficulty swallowing, stiff joints, or pain. Meds reviewed, taking appropriately.

## 2025-05-20 NOTE — PHYSICAL EXAM
[Restricted in physically strenuous activity but ambulatory and able to carry out work of a light or sedentary nature] : Status 1- Restricted in physically strenuous activity but ambulatory and able to carry out work of a light or sedentary nature, e.g., light house work, office work [Thin] : thin [Normal] : affect appropriate [de-identified] : generalized xeroderma

## 2025-05-20 NOTE — REVIEW OF SYSTEMS
[Negative] : Heme/Lymph [Fever] : no fever [Chills] : no chills [Night Sweats] : no night sweats [Fatigue] : no fatigue [Recent Change In Weight] : ~T no recent weight change [Eye Pain] : no eye pain [Red Eyes] : eyes not red [Dry Eyes] : no dryness of the eyes [Vision Problems] : no vision problems [Nosebleeds] : no nosebleeds [Shortness Of Breath] : no shortness of breath [Wheezing] : no wheezing [Cough] : no cough [SOB on Exertion] : no shortness of breath during exertion [Vomiting] : no vomiting [Constipation] : no constipation [Dysuria] : no dysuria [Skin Rash] : no skin rash [Skin Wound] : no skin wound [FreeTextEntry3] : Pruritic bilateral eyes

## 2025-05-20 NOTE — ASSESSMENT
[FreeTextEntry1] : 70 yo with pmhx of AML, s/p HSCT transplant on 11/12/24. Today is day +188.   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 -----Repeat BMB on 6/17 d/t FISH result   Engraftment: ------Chimerism: 11/26/24 >97% donor/recipient not detected. ------ANC engraftment date: 11/26/24 ------------ANC down trending - 1.73 (5/16), 1.35 (5/20) - continue to monitor ------Platelets engraftment date: 11/29/24, last received platelets on 11/22/24.  ------ABO Rh incompatibility issues: none  ------G-CSF support: no ------Transfusions requirements: No ------Chimerism full; monitor every other week and on marrow until d+180 ------------last performed 3/19/25 >97% donor/recipient not detected, 4/8/25 >97% donor/recipient not detected.  ------Immune reconstitution due on days +365   GvHD: -------Acute: No -------Chronic: N/A -------Steroid Taper: N/A -------ECP: N/A  ------Prophylaxis with ABC regimen, completed ------Reviewed GvHD signs and symptoms to report, none at this time    ID: ---Monitoring: Send CMV, Adenovirus and EBV PCR q visit until day +180 ----------CMV: negative to date ----------EBV: reactivation s/p Ruxience x4 (2/5-2/25/25); PCR now remains not quantifiable as of 3/5/25, last not detected 4/23/25 ---------Adeno: negative to date  --->Continue ppx: ---------PCP: Bactrim ---------HSV and VZV: acyclovir ---------Fungal: posaconazole d/c'd day +75 per SOP ---------CMV: letermovir until day +200 ---------SOS prophylaxis: Ursodiol d/c'd 1/7/25 per SOP ---------If chronic GvHD present, encapsulated organism coverage: N/A  OS ----Bilateral pruritic eyes advised preservative-free eye drops.   Survivorship: - 25-hydroxyclaciferol (day +80): sent on 2/19/25 (20.8), not supplemented; Sent on 4/23/25 (20.9) - TSH (annually): - Ferritin (annually): - Fertility (annual, x2 if applicable): - Bone mineral densitometry (day +365 for women, men exposed to prolonged steroids and CNI): - Vaccinations         - Per SOP starting at day +180 - plan to start 5/20         - Flu at day + 90, Received on 3/19/25        - COVID per CDC guidelines    Questions and concerns addressed. Reviewed signs and symptoms to report to clinic; patient has clinic and after hours number  RTC: q2 weeks 6/3 labs, provider visit 6/17 BMB, labs, provider visit  Aracelis Heller NP Adult Transplantation and Cellular Therapy Program [facility]  I saw the patient on day +188 following his allogeneic HSCT. I reviewed the data and the above note.  Diagnosis: AML Disease staging at transplant: CR Conditioning regimen: LIV Bu Flu rATG GvHD prophylaxis: ABC Donor type: MUD  Stem cell source: Peripheral ABO  Recipient: A positive  Donor: O positive  Incompatibility: Minor CMV Status  Recipient: Positive  Donor: Negative Toxoplasmosis Status  Recipient: Negative  Donor: Negative He has been doing well with no complaints.  He has no skin rash. His oral cavity is normal. He has engrafted with full donor chimerism. His counts are adequate although his ANC and plts had decreased somewhat as of late.  He has no cGvHD. He has no infectious complications. His viral studies now including his EBV are negative. He is on appropriate prophylaxis. We will delay his vaccinations based on his recent therapy with rituximab and his CD19 count.  His BM studies are negative with the exception of some of his original abnormalities which were detected but below the normal cutoff. His Onkosite (r) studies are normal. I will plan on repeating his BM studies in 4 weeks.  He has no other issues.  He understands his current status and precautions. He will return to clinic in 2 weeks with repeat CBC. I spent a total of 30 minutes performing the above tasks. MARISABEL Mena MD.

## 2025-05-20 NOTE — HISTORY OF PRESENT ILLNESS
[Research Protocol] : Research Protocol  [de-identified] : 71 year old s/p HSCT for AML    Status post allogeneic transplant, day +188 Transplant physician: Dr. Bay  Referring physician:  Diagnosis: AML  Disease staging at transplant: CR Conditioning regimen: LIV BuFluATG GvHD prophylaxis: PTCyBorAba (ABC) on study  Donor type: MUD                    Mismatches: N/A, 10/10                    Stem cell source: Peripheral  ABO         Recipient: A positive         Donor: O positive          Incompatibility: Minor  CMV Status  Recipient: Positive  Donor: Negative Toxoplasmosis Status  Recipient: Negative   Donor: Negative    PMH:  BPH, AML     -----------------Oncologic Hx:-------------------------------- He had been well, exercising regularly, three miles/day. By early 7/2024, he had developed NGUYEN on climbing stairs. Pancytopenia with circulating blasts were detected and he was admitted to [facility] 7/18/2024. 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 He was treated with decitabine and venetoclax x 3 cycles.    -----------Transplant hospital course:---------------------  Tolerated well, c/b increased BP due to post transplant cytoxan fluids. Additionally, c/b hematuria, interstitial mucositis.  -----Day 0 = 11/12/24  --------------Post Transplant Course:-----------------------   -------------Oncological treatments:-------------------------- Decitabine and venetoclax 100mg 21d   ---------------Bone Marrow Bx Results:---------------------- Bone Marrow Biopsy, Clot and Bone Marrow Aspirate, Right PIC -MDS/AML with mutated TP53 ( per International Consensus  Classification of Myeloid Neoplasms and Acute Leukemias) -MDS with biallelic TP53 inactivation (WHO-YCGG1xi ed). Hypercellular marrow for age with multilineage dysplasia, and increased blasts/immature cells (12% on aspirate differential count, 10-15% by CD34 IHC stain). -Abnormal Karyotype 46-47,XY,del(7)(q11.2),+8,del(8)(q13q22)x2,del(9)(q13q22),-11,-12, add(17)(p11.2),del(20)(q11.2q13.3),+1-2mar                      {cp13                                                               \}/46,XY                      {7                                                               \} -FISH studies detected 7q deletion (60%), Trisomy 8 (62.5%), 20q deletion (59.5%) and TP53 deletion (55.5%). -On[employer] Advanced NGS Myeloid Report detected (Tier I) TP53 p.Elp574Jsq (VAF38%) and U2AF1 p.Mjx345Yrr (VAF32%). There are increased blasts/immature cells (12% on aspirate differential count and approximately 10-15% of the cellularity by CD34 immunohistochemical stain).  8/15/24:  Bone marrow biopsy and bone marrow aspirate  - Cellular marrow (30-40%) with markedly decreased myelopoiesis, decreased left shifted erythropoiesis and relatively normal megakaryopoiesis with no increase in blast population  - Persistent 7q deletion (1%), trisomy 8 (2.5%), 20q deletion (2.5%) and TP53 deletion (0.5%) by FISH studies, below the cut-off values  _ Persistent mutations in TP53 p.Ohb524Knj and U2AF1 p.Exs862Qex at low VAF levels  10/15/24 (Pretransplant BmBx) ----Morphology: Normocellular bone marrow with trilineage regeneration (focal myeloid predominant, focal erythroid predominant, overall decreased megakaryocytes with focally normal numbers and normal morphology, and increased iron stores (history of MDS/AML with complex karyotype and TP53 deletion, under treatment)      - No morphologic or immunophenotypic findings of bone marrow involvement by leukemia are identified ----Karyotype: Normal ----FISH: deletion of TP53 below cut off value  ----NGS: Normal    - Day 30 post transplant: completed 12/12/24  ----Morphology: SANJEEV ----Karyotype: //46,XX[20] ----FISH: Normal FISH ----NGS: [employer] Advanced NGS Myeloid Panel is normal. No mutations identified.    - Day 100 post transplant: completed 2/19/25 ----Morphology: Trilineage hematopoiesis with maturation and increased iron stores. No morphologic or immunophenotypic features of bone marrow involvement by MDS/AML are identified. ----Karyotype:46,XX[20] ----FISH: Normal Fish ----NGS:[employer] NGS panel- no mutations identified.   - Day 180 post transplant: completed 5/6/25 ----Morphology: Normal cellularity with erythroid predominant trilineage hematopoiesis with maturation, increased hematogones, and increased iron stores (history of MDS/AML with mutated TP53, ICC/ MDS with biallelic TP53 inactivation, [id number] ----Karyotype: ----FISH: shows 7q deletion, trisomy 8, TP53 deletion, and del 20q deletion, all below the cutoff values.     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 Otherwise, no acute concerns at this time. Continues to increase resistance exercises. Eat 3 meals/day and drink 2L/day. Will repeat BMB in 4 weeks (6/17/25) to reassess FISH. BMT vax rescheduled for August d/t C19 count of zero. Otherwise, denies fever, chills, headache, rash, chest pain, palpitations, SOB, cough, N/V/D, abd pain, swelling, or pain. Meds reviewed, taking appropriately.

## 2025-05-28 NOTE — DATA REVIEWED
[de-identified] : Right ear: Mild to moderately-severe mixed HL  Left ear: Moderate to profound mixed HL  Type B tymp in right ear Type E tymp in left ear with large ECV

## 2025-05-28 NOTE — HISTORY OF PRESENT ILLNESS
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 He was Recently diagnosed with AML 08/2024 s/p stem cell transplant 11/12/24. He is now experiencing pulse noises to bilateral ears for 6-7 months. He continues to wear right hearing aid. He denies otorrhea, otalgia, recent ear infections and vertigo symptoms.

## 2025-06-06 NOTE — HISTORY OF PRESENT ILLNESS
[Research Protocol] : Research Protocol  [de-identified] : 71 year old s/p HSCT for AML    Status post allogeneic transplant, day +203 Transplant physician: Dr. Bay  Referring physician:  Diagnosis: AML  Disease staging at transplant: CR Conditioning regimen: LIV BuFluATG GvHD prophylaxis: PTCyBorAba (ABC) on study  Donor type: MUD                    Mismatches: N/A, 10/10                    Stem cell source: Peripheral  ABO         Recipient: A positive         Donor: O positive          Incompatibility: Minor  CMV Status  Recipient: Positive  Donor: Negative Toxoplasmosis Status  Recipient: Negative   Donor: Negative    PMH:  BPH, AML     -----------------Oncologic Hx:-------------------------------- He had been well, exercising regularly, three miles/day. By early 7/2024, he had developed NGUYEN on climbing stairs. Pancytopenia with circulating blasts were detected and he was admitted to [facility] 7/18/2024. 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 He was treated with decitabine and venetoclax x 3 cycles.    -----------Transplant hospital course:---------------------  Tolerated well, c/b increased BP due to post transplant cytoxan fluids. Additionally, c/b hematuria, interstitial mucositis.  -----Day 0 = 11/12/24  --------------Post Transplant Course:-----------------------   -------------Oncological treatments:-------------------------- Decitabine and venetoclax 100mg 21d   ---------------Bone Marrow Bx Results:---------------------- Bone Marrow Biopsy, Clot and Bone Marrow Aspirate, Right PIC -MDS/AML with mutated TP53 ( per International Consensus  Classification of Myeloid Neoplasms and Acute Leukemias) -MDS with biallelic TP53 inactivation (WHO-COKA1rp ed). Hypercellular marrow for age with multilineage dysplasia, and increased blasts/immature cells (12% on aspirate differential count, 10-15% by CD34 IHC stain). -Abnormal Karyotype 46-47,XY,del(7)(q11.2),+8,del(8)(q13q22)x2,del(9)(q13q22),-11,-12, add(17)(p11.2),del(20)(q11.2q13.3),+1-2mar                       {cp13                                                                   }/46,XY                       {7                                                                   } -FISH studies detected 7q deletion (60%), Trisomy 8 (62.5%), 20q deletion (59.5%) and TP53 deletion (55.5%). -OnkoSit Advanced NGS Myeloid Report detected (Tier I) TP53 p.Yjd104Ewt (VAF38%) and U2AF1 p.Doz351Dds (VAF32%). There are increased blasts/immature cells (12% on aspirate differential count and approximately 10-15% of the cellularity by CD34 immunohistochemical stain).  8/15/24:  Bone marrow biopsy and bone marrow aspirate  - Cellular marrow (30-40%) with markedly decreased myelopoiesis, decreased left shifted erythropoiesis and relatively normal megakaryopoiesis with no increase in blast population  - Persistent 7q deletion (1%), trisomy 8 (2.5%), 20q deletion (2.5%) and TP53 deletion (0.5%) by FISH studies, below the cut-off values  _ Persistent mutations in TP53 p.Tye552Cys and U2AF1 p.Glq362Jsw at low VAF levels  10/15/24 (Pretransplant BmBx) ----Morphology: Normocellular bone marrow with trilineage regeneration (focal myeloid predominant, focal erythroid predominant, overall decreased megakaryocytes with focally normal numbers and normal morphology, and increased iron stores (history of MDS/AML with complex karyotype and TP53 deletion, under treatment)      - No morphologic or immunophenotypic findings of bone marrow involvement by leukemia are identified ----Karyotype: Normal ----FISH: deletion of TP53 below cut off value  ----NGS: Normal    - Day 30 post transplant: completed 12/12/24  ----Morphology: SANJEEV ----Karyotype: //46,XX[20] ----FISH: Normal FISH ----NGS: [employer] Advanced NGS Myeloid Panel is normal. No mutations identified.    - Day 100 post transplant: completed 2/19/25 ----Morphology: Trilineage hematopoiesis with maturation and increased iron stores. No morphologic or immunophenotypic features of bone marrow involvement by MDS/AML are identified. ----Karyotype:46,XX[20] ----FISH: Normal Fish ----NGS:[email or web address] NGS panel- no mutations identified.   - Day 180 post transplant: completed 5/6/25 ----Morphology: Normal cellularity with erythroid predominant trilineage hematopoiesis with maturation, increased hematogones, and increased iron stores (history of MDS/AML with mutated TP53, ICC/ MDS with biallelic TP53 inactivation, [id number] ----Karyotype: ----FISH: shows 7q deletion, trisomy 8, TP53 deletion, and del 20q deletion, all below the cutoff values.     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 Otherwise, no acute concerns at this time. Continues to increase resistance exercises. Eat 3 meals/day and drink 2L/day. Will repeat BMB in 4 weeks (6/17/25) to reassess FISH. BMT vax rescheduled for August d/t C19 count of zero. Otherwise, denies fever, chills, headache, rash, chest pain, palpitations, SOB, cough, N/V/D, abd pain, swelling, or pain. Meds reviewed, taking appropriately.   6/3/25: Day + 203 post transplant. Overall, well and offers no acute concerns. Denies fever, chills, nausea, vomiting, diarrhea, rash or any signs of active bleeding. Denies SOB, chest pain or B/L LE edema.

## 2025-06-06 NOTE — ASSESSMENT
[FreeTextEntry1] : 72 yo with pmhx of AML, s/p HSCT transplant on 11/12/24. Today is day +203.   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 -----Repeat BMB on 6/17/25 cancelled.   ----Recurrent disease- refer patient to Dr Anderson for further treatment. Will arrange for a DLI.  Engraftment: ------Chimerism: 11/26/24 >97% donor/recipient not detected. ------ANC engraftment date: 11/26/24 ------------ANC down trending - 1.73 (5/16), 1.35 (5/20), 0.80 on (6/3) - continue to monitor ------Platelets engraftment date: 11/29/24, last received platelets on 11/22/24.  ------ABO Rh incompatibility issues: none  ------G-CSF support: no ------Transfusions requirements: No ------Chimerism full; monitor every other week and on marrow until d+180 ------------ 3/19/25 >97% donor/recipient not detected, 4/8/25 >97% donor/recipient not detected, 5/6/25 peripheral blood >97% donor/recipient not detected. 5/6/25 bone marrow biopsy= 97% donor, 3% recipient. ------Immune reconstitution due on days +365   GvHD: -------Acute: No -------Chronic: N/A -------Steroid Taper: N/A -------ECP: N/A  ------Prophylaxis with ABC regimen, completed ------Reviewed GvHD signs and symptoms to report, none at this time    ID: ---Monitoring: Send CMV, Adenovirus and EBV PCR q visit until day +180 ----------CMV: negative to date ----------EBV: reactivation s/p Ruxience x4 (2/5-2/25/25); PCR now remains not quantifiable as of 3/5/25, last not detected 4/23/25 ---------Adeno: negative to date  --->Continue ppx: ---------PCP: Bactrim ---------HSV and VZV: acyclovir ---------Fungal: posaconazole d/c'd day +75 per SOP ---------CMV: letermovir until day +200 ---------SOS prophylaxis: Ursodiol d/c'd 1/7/25 per SOP ---------If chronic GvHD present, encapsulated organism coverage: N/A  OS ----Bilateral pruritic eyes advised preservative-free eye drops.   Survivorship: - 25-hydroxyclaciferol (day +80): sent on 2/19/25 (20.8), not supplemented; Sent on 4/23/25 (20.9) - TSH (annually): - Ferritin (annually): - Fertility (annual, x2 if applicable): - Bone mineral densitometry (day +365 for women, men exposed to prolonged steroids and CNI): - Vaccinations         - Per SOP starting at day +180 - plan to start 5/20         - Flu at day + 90, Received on 3/19/25        - COVID per CDC guidelines    Questions and concerns addressed. Reviewed signs and symptoms to report to clinic; patient has clinic and after hours number  RTC: q2 weeks Refer patient to Dr Anderson for further treatment.  Linette Houston NP Adult Transplantation and Cellular Therapy Program [facility]  I saw the patient on day +203 following his allogeneic HSCT. I reviewed the data and the above note.  Diagnosis: AML Disease staging at transplant: CR Conditioning regimen: LIV Bu Flu rATG GvHD prophylaxis: ABC Donor type: MUD  Stem cell source: Peripheral ABO  Recipient: A positive  Donor: O positive  Incompatibility: Minor CMV Status  Recipient: Positive  Donor: Negative Toxoplasmosis Status  Recipient: Negative  Donor: Negative He has been doing well with no complaints.  He has no skin rash. His oral cavity is normal. His counts have decreased further. He has 3% of recipient cells on his last donor chimerism.  He has no cGvHD. He has no infectious complications. His viral studies now including his EBV are negative. He is on appropriate prophylaxis. We will delay his vaccinations based on his recent therapy with rituximab and his CD19 count.  His BM studies are remarkable for a small population (<1%) of myeloid cells with abnormal profile. Karyotype showed 1 cell out 20 examined with complex abnormalities.  Overall, his picture is consistent with recurrent disease.  I recommend resuming his HMA with venetoclax therapy with DLI. We requested re-collection of cells from the donor.  I discussed the above with the patient and his wife and answered their questions.  MARISABEL Mena MD.

## 2025-06-06 NOTE — ASSESSMENT
[FreeTextEntry1] : 72 yo with pmhx of AML, s/p HSCT transplant on 11/12/24. Today is day +203.   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 -----Repeat BMB on 6/17/25 cancelled.   ----Recurrent disease- refer patient to Dr Anderson for further treatment. Will arrange for a DLI.  Engraftment: ------Chimerism: 11/26/24 >97% donor/recipient not detected. ------ANC engraftment date: 11/26/24 ------------ANC down trending - 1.73 (5/16), 1.35 (5/20), 0.80 on (6/3) - continue to monitor ------Platelets engraftment date: 11/29/24, last received platelets on 11/22/24.  ------ABO Rh incompatibility issues: none  ------G-CSF support: no ------Transfusions requirements: No ------Chimerism full; monitor every other week and on marrow until d+180 ------------ 3/19/25 >97% donor/recipient not detected, 4/8/25 >97% donor/recipient not detected, 5/6/25 peripheral blood >97% donor/recipient not detected. 5/6/25 bone marrow biopsy= 97% donor, 3% recipient. ------Immune reconstitution due on days +365   GvHD: -------Acute: No -------Chronic: N/A -------Steroid Taper: N/A -------ECP: N/A  ------Prophylaxis with ABC regimen, completed ------Reviewed GvHD signs and symptoms to report, none at this time    ID: ---Monitoring: Send CMV, Adenovirus and EBV PCR q visit until day +180 ----------CMV: negative to date ----------EBV: reactivation s/p Ruxience x4 (2/5-2/25/25); PCR now remains not quantifiable as of 3/5/25, last not detected 4/23/25 ---------Adeno: negative to date  --->Continue ppx: ---------PCP: Bactrim ---------HSV and VZV: acyclovir ---------Fungal: posaconazole d/c'd day +75 per SOP ---------CMV: letermovir until day +200 ---------SOS prophylaxis: Ursodiol d/c'd 1/7/25 per SOP ---------If chronic GvHD present, encapsulated organism coverage: N/A  OS ----Bilateral pruritic eyes advised preservative-free eye drops.   Survivorship: - 25-hydroxyclaciferol (day +80): sent on 2/19/25 (20.8), not supplemented; Sent on 4/23/25 (20.9) - TSH (annually): - Ferritin (annually): - Fertility (annual, x2 if applicable): - Bone mineral densitometry (day +365 for women, men exposed to prolonged steroids and CNI): - Vaccinations         - Per SOP starting at day +180 - plan to start 5/20         - Flu at day + 90, Received on 3/19/25        - COVID per CDC guidelines    Questions and concerns addressed. Reviewed signs and symptoms to report to clinic; patient has clinic and after hours number  RTC: q2 weeks Refer patient to Dr Anderson for further treatment.  Linette Houston NP Adult Transplantation and Cellular Therapy Program [facility]  I saw the patient on day +203 following his allogeneic HSCT. I reviewed the data and the above note.  Diagnosis: AML Disease staging at transplant: CR Conditioning regimen: LIV Bu Flu rATG GvHD prophylaxis: ABC Donor type: MUD  Stem cell source: Peripheral ABO  Recipient: A positive  Donor: O positive  Incompatibility: Minor CMV Status  Recipient: Positive  Donor: Negative Toxoplasmosis Status  Recipient: Negative  Donor: Negative He has been doing well with no complaints.  He has no skin rash. His oral cavity is normal. His counts have decreased further. He has 3% of recipient cells on his last donor chimerism.  He has no cGvHD. He has no infectious complications. His viral studies now including his EBV are negative. He is on appropriate prophylaxis. We will delay his vaccinations based on his recent therapy with rituximab and his CD19 count.  His BM studies are remarkable for a small population (<1%) of myeloid cells with abnormal profile. Karyotype showed 1 cell out 20 examined with complex abnormalities.  Overall, his picture is consistent with recurrent disease.  I recommend resuming his HMA with venetoclax therapy with DLI. We requested re-collection of cells from the donor.  I discussed the above with the patient and his wife and answered their questions.  MARISABEL Mena MD.

## 2025-06-06 NOTE — PROCEDURE
[Bone Marrow Biopsy] : bone marrow biopsy [Bone Marrow Aspiration] : bone marrow aspiration  [Patient] : the patient [Verbal Consent Obtained] : verbal consent was obtained prior to the procedure [Patient identification verified] : patient identification verified [Procedure verified and consent obtained] : procedure verified and consent obtained [Laterality verified and correct site marked] : laterality verified and correct site marked [Left] : site: left [Correct positioning] : correct positioning [Prone] : prone [The left posterior iliac crest was prepped with betadine and draped, using sterile technique.] : The left posterior iliac crest was prepped with betadine and draped, using sterile technique. [Aspirate] : aspirate [Cytogenetics] : cytogenetics [FISH] : FISH [Biopsy] : biopsy [Flow Cytometry] : flow cytometry [] : The patient was instructed to remove the bandage the following AM. The patient may bathe. Acetaminophen may be taken for discomfort, as per package directions.If there are any other problems, the patient was instructed to call the office. The patient verbalized understanding, and is aware of the office contact numbers. [FreeTextEntry2] : Patient received 10cc 2% lidocaine to left iliac crest. Underwent bone marrow biopsy and aspiration with no complications. Pressure applied to site, no s/sx of infection, bleeding post procedure. Pt knows to keep site clean for 24 hours post procedure.  [FreeTextEntry1] : hx allogeneic stem cell transplant; day +180 bone marrow biopsy

## 2025-06-06 NOTE — PHYSICAL EXAM
[Restricted in physically strenuous activity but ambulatory and able to carry out work of a light or sedentary nature] : Status 1- Restricted in physically strenuous activity but ambulatory and able to carry out work of a light or sedentary nature, e.g., light house work, office work [Thin] : thin [Normal] : affect appropriate [de-identified] : generalized xeroderma

## 2025-06-06 NOTE — HISTORY OF PRESENT ILLNESS
[Research Protocol] : Research Protocol  [de-identified] : 71 year old s/p HSCT for AML    Status post allogeneic transplant, day +203 Transplant physician: Dr. Bay  Referring physician:  Diagnosis: AML  Disease staging at transplant: CR Conditioning regimen: LIV BuFluATG GvHD prophylaxis: PTCyBorAba (ABC) on study  Donor type: MUD                    Mismatches: N/A, 10/10                    Stem cell source: Peripheral  ABO         Recipient: A positive         Donor: O positive          Incompatibility: Minor  CMV Status  Recipient: Positive  Donor: Negative Toxoplasmosis Status  Recipient: Negative   Donor: Negative    PMH:  BPH, AML     -----------------Oncologic Hx:-------------------------------- He had been well, exercising regularly, three miles/day. By early 7/2024, he had developed NGUYEN on climbing stairs. Pancytopenia with circulating blasts were detected and he was admitted to [facility] 7/18/2024. 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 He was treated with decitabine and venetoclax x 3 cycles.    -----------Transplant hospital course:---------------------  Tolerated well, c/b increased BP due to post transplant cytoxan fluids. Additionally, c/b hematuria, interstitial mucositis.  -----Day 0 = 11/12/24  --------------Post Transplant Course:-----------------------   -------------Oncological treatments:-------------------------- Decitabine and venetoclax 100mg 21d   ---------------Bone Marrow Bx Results:---------------------- Bone Marrow Biopsy, Clot and Bone Marrow Aspirate, Right PIC -MDS/AML with mutated TP53 ( per International Consensus  Classification of Myeloid Neoplasms and Acute Leukemias) -MDS with biallelic TP53 inactivation ([id number] ed). Hypercellular marrow for age with multilineage dysplasia, and increased blasts/immature cells (12% on aspirate differential count, 10-15% by CD34 IHC stain). -Abnormal Karyotype 46-47,XY,del(7)(q11.2),+8,del(8)(q13q22)x2,del(9)(q13q22),-11,-12, add(17)(p11.2),del(20)(q11.2q13.3),+1-2mar                       {cp13                                                                   }/46,XY                       {7                                                                   } -FISH studies detected 7q deletion (60%), Trisomy 8 (62.5%), 20q deletion (59.5%) and TP53 deletion (55.5%). -[employer] Advanced NGS Myeloid Report detected (Tier I) TP53 p.Yjg432Gyj (VAF38%) and U2AF1 p.Ydy733Gzs (VAF32%). There are increased blasts/immature cells (12% on aspirate differential count and approximately 10-15% of the cellularity by CD34 immunohistochemical stain).  8/15/24:  Bone marrow biopsy and bone marrow aspirate  - Cellular marrow (30-40%) with markedly decreased myelopoiesis, decreased left shifted erythropoiesis and relatively normal megakaryopoiesis with no increase in blast population  - Persistent 7q deletion (1%), trisomy 8 (2.5%), 20q deletion (2.5%) and TP53 deletion (0.5%) by FISH studies, below the cut-off values  _ Persistent mutations in TP53 p.Mgg375Fmg and U2AF1 p.Dtv045Wmf at low VAF levels  10/15/24 (Pretransplant BmBx) ----Morphology: Normocellular bone marrow with trilineage regeneration (focal myeloid predominant, focal erythroid predominant, overall decreased megakaryocytes with focally normal numbers and normal morphology, and increased iron stores (history of MDS/AML with complex karyotype and TP53 deletion, under treatment)      - No morphologic or immunophenotypic findings of bone marrow involvement by leukemia are identified ----Karyotype: Normal ----FISH: deletion of TP53 below cut off value  ----NGS: Normal    - Day 30 post transplant: completed 12/12/24  ----Morphology: SANJEEV ----Karyotype: //46,XX[20] ----FISH: Normal FISH ----NGS: [employer] Advanced NGS Myeloid Panel is normal. No mutations identified.    - Day 100 post transplant: completed 2/19/25 ----Morphology: Trilineage hematopoiesis with maturation and increased iron stores. No morphologic or immunophenotypic features of bone marrow involvement by MDS/AML are identified. ----Karyotype:46,XX[20] ----FISH: Normal Fish ----NGS:[facility] NGS panel- no mutations identified.   - Day 180 post transplant: completed 5/6/25 ----Morphology: Normal cellularity with erythroid predominant trilineage hematopoiesis with maturation, increased hematogones, and increased iron stores (history of MDS/AML with mutated TP53, ICC/ MDS with biallelic TP53 inactivation, [id number] ----Karyotype: ----FISH: shows 7q deletion, trisomy 8, TP53 deletion, and del 20q deletion, all below the cutoff values.     ----NGS: Normal   ---------Past Surgical Hx---------- Partial mastoidectomy and requires periodic left mastoid debridement  -------- -Social Hx ----------------- The patient is  and has 2 daughters, 30 and 27y/o local in [place]  Patient has one brother (69)  Work: Nephrologist  Born in: [place]  Never a smoker/Social alcohol use  ----------Past Family Hx:------------ Noncontributory  [FreeTextEntry1] : Phase I-II Study of High-Dose Post-Transplant Cyclophosphamide, Bortezomib, and Abatacept for the Prevention of Aekqk-rmenvo-Ovqq Disease (GvHD) following Allogeneic Hematopoietic Stem Cell Transplantation (HSCT) [de-identified] : 5/20/25: Day +188 post-transplant: Complains of eye pruritic eyes possibly to seasonal allergies, advise to use preservative-free eye drops. Otherwise, no acute concerns at this time. Continues to increase resistance exercises. Eat 3 meals/day and drink 2L/day. Will repeat BMB in 4 weeks (6/17/25) to reassess FISH. BMT vax rescheduled for August d/t C19 count of zero. Otherwise, denies fever, chills, headache, rash, chest pain, palpitations, SOB, cough, N/V/D, abd pain, swelling, or pain. Meds reviewed, taking appropriately.   6/3/25: Day + 203 post transplant. Overall, well and offers no acute concerns. Denies fever, chills, nausea, vomiting, diarrhea, rash or any signs of active bleeding. Denies SOB, chest pain or B/L LE edema.

## 2025-06-06 NOTE — REVIEW OF SYSTEMS
[Negative] : Heme/Lymph [FreeTextEntry3] : Pruritic bilateral eyes [Fever] : no fever [Chills] : no chills [Night Sweats] : no night sweats [Fatigue] : no fatigue [Recent Change In Weight] : ~T no recent weight change [Eye Pain] : no eye pain [Red Eyes] : eyes not red [Dry Eyes] : no dryness of the eyes [Vision Problems] : no vision problems [Nosebleeds] : no nosebleeds [Shortness Of Breath] : no shortness of breath [Wheezing] : no wheezing [Cough] : no cough [SOB on Exertion] : no shortness of breath during exertion [Vomiting] : no vomiting [Constipation] : no constipation [Dysuria] : no dysuria [Skin Rash] : no skin rash [Skin Wound] : no skin wound

## 2025-06-06 NOTE — PHYSICAL EXAM
[Restricted in physically strenuous activity but ambulatory and able to carry out work of a light or sedentary nature] : Status 1- Restricted in physically strenuous activity but ambulatory and able to carry out work of a light or sedentary nature, e.g., light house work, office work [Thin] : thin [Normal] : affect appropriate [de-identified] : generalized xeroderma

## 2025-06-08 NOTE — ASSESSMENT
[FreeTextEntry1] : 70 yo with pmhx of AML, s/p HSCT transplant on 11/12/24. 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 plan is to start Dacogen and Venectolax  EKG done  Allopurinol 300 mg daily  check CMP LDH  follow up FTER [place]   Disease monitoring: --> Post transplant bone marrow biopsy on Day +30 (completed 12/12/24), Day+ 100 (2/19/25), and Day +180 (5/12/25 - completed 5/6). -----Repeat BMB on 6/17/25 cancelled.   ----Recurrent disease- refer patient to Dr Anderson for further treatment. Will arrange for a DLI.  Engraftment: ------Chimerism: 11/26/24 >97% donor/recipient not detected. ------ANC engraftment date: 11/26/24 ------------ANC down trending - 1.73 (5/16), 1.35 (5/20), 0.80 on (6/3) - continue to monitor ------Platelets engraftment date: 11/29/24, last received platelets on 11/22/24.  ------ABO Rh incompatibility issues: none  ------G-CSF support: no ------Transfusions requirements: No ------Chimerism full; monitor every other week and on marrow until d+180 ------------ 3/19/25 >97% donor/recipient not detected, 4/8/25 >97% donor/recipient not detected, 5/6/25 peripheral blood >97% donor/recipient not detected. 5/6/25 bone marrow biopsy= 97% donor, 3% recipient. ------Immune reconstitution due on days +365   GvHD: -------Acute: No -------Chronic: N/A -------Steroid Taper: N/A -------ECP: N/A  ------Prophylaxis with ABC regimen, completed ------Reviewed GvHD signs and symptoms to report, none at this time    ID: ---Monitoring: Send CMV, Adenovirus and EBV PCR q visit until day +180 ----------CMV: negative to date ----------EBV: reactivation s/p Ruxience x4 (2/5-2/25/25); PCR now remains not quantifiable as of 3/5/25, last not detected 4/23/25

## 2025-06-08 NOTE — HISTORY OF PRESENT ILLNESS
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  He is s/p cycle 1 decitabine and venetoclax. He had been well, exercising regularly, doing HIIT workouts, when he began to feel increasingly fatigued mid 6/2024. By early 7/2024, he had developed NGUYEN on climbing stairs. Pancytopenia with circulating blasts were detected and he was admitted to [facility] 7/18/2024. BMA/Bx:  Final Diagnosis Bone Marrow Biopsy, Clot and Bone Marrow Aspirate, Right PIC -MDS/AML with mutated TP53 ( per International Consensus   Classification of Myeloid Neoplasms and Acute Leukemias) -MDS with biallelic TP53 inactivation (WHO-BGNS4pr ed). Hypercellular marrow for age with multilineage dysplasia, and increased blasts/immature cells (12% on aspirate differential  count, 10-15% by CD34 IHC stain). -Abnormal Karyotype 46-47,XY,del(7)(q11.2),+8,del(8)(q13q22)x2,del(9)(q13q22),-11,-12, add(17)(p11.2),del(20)(q11.2q13.3),+1-2mar    {cp13         }/46,XY    {7      } -FISH studies detected 7q deletion (60%), Trisomy 8  (62.5%), 20q deletion (59.5%) and TP53 deletion (55.5%). -[facility] Myeloid Report detected (Tier I) TP53 p.Bjd136Sac (VAF38%) and U2AF1 p.Ybx951Zod (VAF32%). There are increased blasts/immature cells (12% on aspirate differential count and approximately 10-15% of the cellularity by CD34 immunohistochemical stain).  CT C/A/P showed small, non specific lung nodules and prostatomegaly.  Treatment with decitabine and venetoclax was started on 7/24/24 and whas been well tolerated. He was discharged home on antibiotic prophylaxis on 7/30. He has no GI c/o, mucositis, dysphagia; he is taking po well.  He has past h/o partial mastoidectomy and requires periodic left mastoid debridement.           [de-identified] : MDS/AML with biallelic P53 mut and complex karyotype [de-identified] : biallelic P53 mutation U2AF1 mutation complex karyotype monosomal karyotype [FreeTextEntry1] : decitabine and venetoclax [de-identified] : AML s/p HSCT for AML day 0 _ 11/12/24  Day 180 post transplant: completed 5/6/25 ----Morphology: Normal cellularity with erythroid predominant trilineage hematopoiesis with maturation, increased hematogones, and increased iron stores (history of MDS/AML with mutated TP53, ICC/ MDS with biallelic TP53 inactivation, [id number] ----Karyotype: ----FISH: shows 7q deletion, trisomy 8, TP53 deletion, and del 20q deletion, all below the cutoff values. ----NGS: Normal -Past Surgical Hx---------- Partial mastoidectomy and requires periodic left mastoid debridement here to prepare to restart treatment with Dacogen and Venectolax LAMIN PENA is a 71 year old, male   plan of treatment discussed  he feels entirely fine

## 2025-06-08 NOTE — ASSESSMENT
[FreeTextEntry1] : 72 yo with pmhx of AML, s/p HSCT transplant on 11/12/24. 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 plan is to start Dacogen and Venectolax  EKG done  Allopurinol 300 mg daily  check CMP LDH  follow up FTER [place]   Disease monitoring: --> Post transplant bone marrow biopsy on Day +30 (completed 12/12/24), Day+ 100 (2/19/25), and Day +180 (5/12/25 - completed 5/6). -----Repeat BMB on 6/17/25 cancelled.   ----Recurrent disease- refer patient to Dr Anderson for further treatment. Will arrange for a DLI.  Engraftment: ------Chimerism: 11/26/24 >97% donor/recipient not detected. ------ANC engraftment date: 11/26/24 ------------ANC down trending - 1.73 (5/16), 1.35 (5/20), 0.80 on (6/3) - continue to monitor ------Platelets engraftment date: 11/29/24, last received platelets on 11/22/24.  ------ABO Rh incompatibility issues: none  ------G-CSF support: no ------Transfusions requirements: No ------Chimerism full; monitor every other week and on marrow until d+180 ------------ 3/19/25 >97% donor/recipient not detected, 4/8/25 >97% donor/recipient not detected, 5/6/25 peripheral blood >97% donor/recipient not detected. 5/6/25 bone marrow biopsy= 97% donor, 3% recipient. ------Immune reconstitution due on days +365   GvHD: -------Acute: No -------Chronic: N/A -------Steroid Taper: N/A -------ECP: N/A  ------Prophylaxis with ABC regimen, completed ------Reviewed GvHD signs and symptoms to report, none at this time    ID: ---Monitoring: Send CMV, Adenovirus and EBV PCR q visit until day +180 ----------CMV: negative to date ----------EBV: reactivation s/p Ruxience x4 (2/5-2/25/25); PCR now remains not quantifiable as of 3/5/25, last not detected 4/23/25

## 2025-06-08 NOTE — HISTORY OF PRESENT ILLNESS
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  He is s/p cycle 1 decitabine and venetoclax. He had been well, exercising regularly, doing HIIT workouts, when he began to feel increasingly fatigued mid 6/2024. By early 7/2024, he had developed NGUYEN on climbing stairs. Pancytopenia with circulating blasts were detected and he was admitted to [facility] 7/18/2024. BMA/Bx:  Final Diagnosis Bone Marrow Biopsy, Clot and Bone Marrow Aspirate, Right PIC -MDS/AML with mutated TP53 ( per International Consensus   Classification of Myeloid Neoplasms and Acute Leukemias) -MDS with biallelic TP53 inactivation (WHO-THXY4zo ed). Hypercellular marrow for age with multilineage dysplasia, and increased blasts/immature cells (12% on aspirate differential  count, 10-15% by CD34 IHC stain). -Abnormal Karyotype 46-47,XY,del(7)(q11.2),+8,del(8)(q13q22)x2,del(9)(q13q22),-11,-12, add(17)(p11.2),del(20)(q11.2q13.3),+1-2mar    {cp13         }/46,XY    {7      } -FISH studies detected 7q deletion (60%), Trisomy 8  (62.5%), 20q deletion (59.5%) and TP53 deletion (55.5%). -[facility] Myeloid Report detected (Tier I) TP53 p.Ytf845Wrz (VAF38%) and U2AF1 p.Wrc872Run (VAF32%). There are increased blasts/immature cells (12% on aspirate differential count and approximately 10-15% of the cellularity by CD34 immunohistochemical stain).  CT C/A/P showed small, non specific lung nodules and prostatomegaly.  Treatment with decitabine and venetoclax was started on 7/24/24 and whas been well tolerated. He was discharged home on antibiotic prophylaxis on 7/30. He has no GI c/o, mucositis, dysphagia; he is taking po well.  He has past h/o partial mastoidectomy and requires periodic left mastoid debridement.           [de-identified] : MDS/AML with biallelic P53 mut and complex karyotype [de-identified] : biallelic P53 mutation U2AF1 mutation complex karyotype monosomal karyotype [FreeTextEntry1] : decitabine and venetoclax [de-identified] : AML s/p HSCT for AML day 0 _ 11/12/24  Day 180 post transplant: completed 5/6/25 ----Morphology: Normal cellularity with erythroid predominant trilineage hematopoiesis with maturation, increased hematogones, and increased iron stores (history of MDS/AML with mutated TP53, ICC/ MDS with biallelic TP53 inactivation, [id number] ----Karyotype: ----FISH: shows 7q deletion, trisomy 8, TP53 deletion, and del 20q deletion, all below the cutoff values. ----NGS: Normal -Past Surgical Hx---------- Partial mastoidectomy and requires periodic left mastoid debridement here to prepare to restart treatment with Dacogen and Venectolax LAMIN PENA is a 71 year old, male   plan of treatment discussed  he feels entirely fine

## 2025-06-19 NOTE — HISTORY OF PRESENT ILLNESS
[Research Protocol] : Research Protocol  [90: Able to carry normal activity; minor signs or symptoms of disease.] : 90: Able to carry normal activity; minor signs or symptoms of disease.  [de-identified] : 71 year old s/p HSCT for AML    Status post allogeneic transplant, day +218 (11/12/24)  Transplant physician: Dr. Bay  Referring physician:  Diagnosis: AML  Disease staging at transplant: CR Conditioning regimen: LIV BuFluATG GvHD prophylaxis: PTCyBorAba (ABC) on study  Donor type: MUD                    Mismatches: N/A, 10/10                    Stem cell source: Peripheral  ABO         Recipient: A positive         Donor: O positive          Incompatibility: Minor  CMV Status  Recipient: Positive  Donor: Negative Toxoplasmosis Status  Recipient: Negative   Donor: Negative    PMH:  BPH, AML     -----------------Oncologic Hx:-------------------------------- He had been well, exercising regularly, three miles/day. By early 7/2024, he had developed NGUYEN on climbing stairs. Pancytopenia with circulating blasts were detected and he was admitted to [facility] 7/18/2024. 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 He was treated with decitabine and venetoclax x 3 cycles.    -----------Transplant hospital course:---------------------  Tolerated well, c/b increased BP due to post transplant cytoxan fluids. Additionally, c/b hematuria, interstitial mucositis.  -----Day 0 = 11/12/24  --------------Post Transplant Course:-----------------------   -------------Oncological treatments:-------------------------- Decitabine and venetoclax 100mg 21d   ---------------Bone Marrow Bx Results:---------------------- Bone Marrow Biopsy, Clot and Bone Marrow Aspirate, Right PIC -MDS/AML with mutated TP53 ( per International Consensus  Classification of Myeloid Neoplasms and Acute Leukemias) -MDS with biallelic TP53 inactivation (WHO-PODQ2qs ed). Hypercellular marrow for age with multilineage dysplasia, and increased blasts/immature cells (12% on aspirate differential count, 10-15% by CD34 IHC stain). -Abnormal Karyotype 46-47,XY,del(7)(q11.2),+8,del(8)(q13q22)x2,del(9)(q13q22),-11,-12, add(17)(p11.2),del(20)(q11.2q13.3),+1-2mar                        {cp13                                                                     \}/46,XY                        {7                                                                     \} -FISH studies detected 7q deletion (60%), Trisomy 8 (62.5%), 20q deletion (59.5%) and TP53 deletion (55.5%). -[facility] Myeloid Report detected (Tier I) TP53 p.Cxc917Orj (VAF38%) and U2AF1 p.Ykv491Esn (VAF32%). There are increased blasts/immature cells (12% on aspirate differential count and approximately 10-15% of the cellularity by CD34 immunohistochemical stain).  8/15/24:  Bone marrow biopsy and bone marrow aspirate  - Cellular marrow (30-40%) with markedly decreased myelopoiesis, decreased left shifted erythropoiesis and relatively normal megakaryopoiesis with no increase in blast population  - Persistent 7q deletion (1%), trisomy 8 (2.5%), 20q deletion (2.5%) and TP53 deletion (0.5%) by FISH studies, below the cut-off values  _ Persistent mutations in TP53 p.Cmk406Kqq and U2AF1 p.Iou235Nsd at low VAF levels  10/15/24 (Pretransplant BmBx) ----Morphology: Normocellular bone marrow with trilineage regeneration (focal myeloid predominant, focal erythroid predominant, overall decreased megakaryocytes with focally normal numbers and normal morphology, and increased iron stores (history of MDS/AML with complex karyotype and TP53 deletion, under treatment)      - No morphologic or immunophenotypic findings of bone marrow involvement by leukemia are identified ----Karyotype: Normal ----FISH: deletion of TP53 below cut off value  ----NGS: Normal    - Day 30 post transplant: completed 12/12/24  ----Morphology: SANJEEV ----Karyotype: //46,XX[20] ----FISH: Normal FISH ----NGS: [facility] Advanced NGS Myeloid Panel is normal. No mutations identified.    - Day 100 post transplant: completed 2/19/25 ----Morphology: Trilineage hematopoiesis with maturation and increased iron stores. No morphologic or immunophenotypic features of bone marrow involvement by MDS/AML are identified. ----Karyotype:46,XX[20] ----FISH: Normal Fish ----NGS:[facility] NGS panel- no mutations identified.   - Day 180 post transplant: completed 5/6/25 ----Morphology: Normal cellularity with erythroid predominant trilineage hematopoiesis with maturation, increased hematogones, and increased iron stores (history of MDS/AML with mutated TP53, ICC/ MDS with biallelic TP53 inactivation, [id number] ----Karyotype: complex in 1  cell out of 20  ----FISH: shows 7q deletion, trisomy 8, TP53 deletion, and del 20q deletion, all below the cutoff values.     ----NGS: Normal   ---------Past Surgical Hx---------- Partial mastoidectomy and requires periodic left mastoid debridement  -------- -Social Hx ----------------- The patient is  and has 2 daughters, 30 and 27y/o local in [place]  Patient has one brother (69)  Work: Nephrologist  Born in: [place]  Never a smoker/Social alcohol use  ----------Past Family Hx:------------ Noncontributory  [FreeTextEntry1] : Phase I-II Study of High-Dose Post-Transplant Cyclophosphamide, Bortezomib, and Abatacept for the Prevention of Pkqlm-rudnwr-Fqbc Disease (GvHD) following Allogeneic Hematopoietic Stem Cell Transplantation (HSCT) [de-identified] : 6/18/25: Presents for post transplant follow up. He reports feeling very well, appetite stable and energy/activity continues to increase. He tolerated initiation of dacogen/venetoclax last week without any issue. Denies fever/chills, headache, chest pain, palpitations, SOB, cough, N/V/D, abd pain, swelling, rash dry eye, difficulty swallowing, stiff joints, or pain. Meds reviewed, taking appropriately.

## 2025-06-19 NOTE — PHYSICAL EXAM
[Restricted in physically strenuous activity but ambulatory and able to carry out work of a light or sedentary nature] : Status 1- Restricted in physically strenuous activity but ambulatory and able to carry out work of a light or sedentary nature, e.g., light house work, office work [Thin] : thin [de-identified] : generalized xeroderma [Normal] : normal appearance, no rash, nodules, vesicles, ulcers, erythema

## 2025-06-19 NOTE — REVIEW OF SYSTEMS
[Negative] : Eyes [Fever] : no fever [Chills] : no chills [Night Sweats] : no night sweats [Fatigue] : no fatigue [Recent Change In Weight] : ~T no recent weight change [Eye Pain] : no eye pain [Red Eyes] : eyes not red [Dry Eyes] : no dryness of the eyes [Vision Problems] : no vision problems [Nosebleeds] : no nosebleeds [Shortness Of Breath] : no shortness of breath [Wheezing] : no wheezing [Cough] : no cough [SOB on Exertion] : no shortness of breath during exertion [Vomiting] : no vomiting [Constipation] : no constipation [Dysuria] : no dysuria [Skin Rash] : no skin rash [Skin Wound] : no skin wound

## 2025-06-19 NOTE — ASSESSMENT
[FreeTextEntry1] : 71 yo with pmhx of AML, s/p HSCT transplant on 11/12/24. Today is day +218.   Disease:  --> AML s/p allogeneic stem cell transplant (MUD) ----------Ds status post transplant: cCR , now with MRD detected on BMBx 5/6/25 (FISH) ------------------By MRD monitoring: FISH ------------------By post Chimerism: full post transplant ----------Post transplant therapy: HMA/leonila (initiated 6/9/25) ------------------Indication: MRD detection on day +180 bmbx ----------DLI given: no, planned tentatively for Aug 2025   Disease monitoring: --> Post transplant BMBx on Day +30 (completed 12/12/24), Day+ 100 (2/19/25) with SANJEEV --> Day +180 BMBx (5/6/25) significant for FISH with 7q deletion, trisomy 8, TP53 deletion, and del 20q deletion, all below the cutoff values and  complex karyotype in 1 cell out of 20; no blasts and normal NGS -->Given cytogenetic relapse, plan for treatment followed by DLI ------Re-referred to Dr Anderson for treatment, initiated dacogen/venetoclax C1D1 6/9/25 ----------Cont f/u and transfusions PRN per heme team ------Requires donor re-collection, [facility] donor not available until Aug 2025 ----------Cont tx until that time, coordinate collection/infusion dates with treatment cycle -------Discussed with pt, likely repeat BMBx prior to DLI -------Pt and wife verbalize understanding of above plan   Engraftment: ------Chimerism: 11/26/24 >97% donor/recipient not detected. ------ANC engraftment date: 11/26/24 ------Platelets engraftment date: 11/29/24 ------ABO Rh incompatibility issues: none  -->Pancytopenia r/t chemotherapy +/- disease ------G-CSF support: no, HOLD given evidence of disease ----------Neutropenia ppx with posaconazole, levaquin ------Transfusions requirements: None at this time ------------Plt 13 today, scheduled for labs/possible transfusion Fri 6/20/25 ------Chimerism full post transplant, cont interval monitoring past day +180 given MRD detected -------------Last sent 5/6/25 >97% donor/no recipient detected on PB, 97% donor/3% recipient on marrow; repeat 6/20/25 ------Immune reconstitution next due on day +365 -------------CD4 80 on 5/6/25 (day +180), cont PCP ppx until at least +1 year    GvHD: -------Acute: No -------Chronic: N/A -------Steroid Taper: N/A -------ECP: N/A  ------Prophylaxis with ABC regimen, completed ------Reviewed GvHD signs and symptoms to report, none at this time    ID: ---Monitoring: CMV, Adenovirus and EBV PCR q visit until day +180 ----------CMV: negative to date; monitor while on treatment ----------EBV: reactivation s/p Ruxience x4 (2/5-2/25/25), now not quantifiable as of 3/5/25; cont to monitor ---------Adeno: negative to date; routine monitoring no longer indicated  --->Continue ppx: ---------PCP: Bactrim ---------HSV and VZV: acyclovir ---------Fungal: posaconazole completed per SOP, now resumed in setting of neutropenia ---------CMV: letermovir  completed per SOP ---------SOS prophylaxis: Ursodiol  completed per SOP,  ---------If chronic GvHD present, encapsulated organism coverage: N/A   Survivorship: - 25-hydroxyclaciferol (day +80): sent on 2/19/25 (20.8), not supplemented; Sent on 4/23/25 (20.9), recommend supplementation, discuss next visit - TSH (annually): - Ferritin (annually): - Fertility (annual, x2 if applicable): - Bone mineral densitometry (day +365 for women, men exposed to prolonged steroids and CNI): - Vaccinations         - HOLD BMT vaccines until 6mo post last rituximab per SOP, plan to initiate 8/6/25        - High dose flu vaccination annually, received on 3/19/25        - COVID vaccination per CDC guidelines, pt may obtain locally  Questions and concerns addressed. Reviewed signs and symptoms to report to clinic; patient has clinic and after hours number   RTC: 6/20 labs, possible plt transfusion 7/2 labs, provider visit resume f/u with Dr. Anderson for treatment/monitoring per lexus Cardenas NP Adult Transplantation and Cellular Therapy Program [facility]  ---------------------------------------------------------------------------------------------------------------------------- I saw the patient on day +218 following his allogeneic HSCT. I reviewed the data and the above note.  Diagnosis: AML Disease staging at transplant: CR Conditioning regimen: LIV Bu Flu rATG GvHD prophylaxis: ABC Donor type: MUD  Stem cell source: Peripheral ABO  Recipient: A positive  Donor: O positive  Incompatibility: Minor CMV Status  Recipient: Positive  Donor: Negative Toxoplasmosis Status  Recipient: Negative  Donor: Negative Since last seen, the patient received 1 cycle of decitabine and venetoclax.  Today, he had no complaints.  He has no evidence of cGVHD. He has no viral reactivation. He is on appropriate prophylaxis.  He will continue the current therapy. We plan on DLI in August based on the donor availability.  I discussed the above with the patient and his wife and answered their questions.  I spent a total of 35 minutes performing the above tasks. > 50% of that time was spent face-to-face with the patient.  MARISABEL Mena MD.

## 2025-06-19 NOTE — PHYSICAL EXAM
[Restricted in physically strenuous activity but ambulatory and able to carry out work of a light or sedentary nature] : Status 1- Restricted in physically strenuous activity but ambulatory and able to carry out work of a light or sedentary nature, e.g., light house work, office work [Thin] : thin [de-identified] : generalized xeroderma [Normal] : normal appearance, no rash, nodules, vesicles, ulcers, erythema

## 2025-06-19 NOTE — PROCEDURE
[Bone Marrow Aspiration] : bone marrow aspiration  [FreeTextEntry1] : hx allogeneic stem cell transplant; day +180 bone marrow biopsy

## 2025-06-19 NOTE — HISTORY OF PRESENT ILLNESS
[Research Protocol] : Research Protocol  [90: Able to carry normal activity; minor signs or symptoms of disease.] : 90: Able to carry normal activity; minor signs or symptoms of disease.  [de-identified] : 71 year old s/p HSCT for AML    Status post allogeneic transplant, day +218 (11/12/24)  Transplant physician: Dr. Bay  Referring physician:  Diagnosis: AML  Disease staging at transplant: CR Conditioning regimen: LIV BuFluATG GvHD prophylaxis: PTCyBorAba (ABC) on study  Donor type: MUD                    Mismatches: N/A, 10/10                    Stem cell source: Peripheral  ABO         Recipient: A positive         Donor: O positive          Incompatibility: Minor  CMV Status  Recipient: Positive  Donor: Negative Toxoplasmosis Status  Recipient: Negative   Donor: Negative    PMH:  BPH, AML     -----------------Oncologic Hx:-------------------------------- He had been well, exercising regularly, three miles/day. By early 7/2024, he had developed NGUYEN on climbing stairs. Pancytopenia with circulating blasts were detected and he was admitted to [facility] 7/18/2024. 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 He was treated with decitabine and venetoclax x 3 cycles.    -----------Transplant hospital course:---------------------  Tolerated well, c/b increased BP due to post transplant cytoxan fluids. Additionally, c/b hematuria, interstitial mucositis.  -----Day 0 = 11/12/24  --------------Post Transplant Course:-----------------------   -------------Oncological treatments:-------------------------- Decitabine and venetoclax 100mg 21d   ---------------Bone Marrow Bx Results:---------------------- Bone Marrow Biopsy, Clot and Bone Marrow Aspirate, Right PIC -MDS/AML with mutated TP53 ( per International Consensus  Classification of Myeloid Neoplasms and Acute Leukemias) -MDS with biallelic TP53 inactivation (WHO-GYWZ4ew ed). Hypercellular marrow for age with multilineage dysplasia, and increased blasts/immature cells (12% on aspirate differential count, 10-15% by CD34 IHC stain). -Abnormal Karyotype 46-47,XY,del(7)(q11.2),+8,del(8)(q13q22)x2,del(9)(q13q22),-11,-12, add(17)(p11.2),del(20)(q11.2q13.3),+1-2mar                        {cp13                                                                     \}/46,XY                        {7                                                                     \} -FISH studies detected 7q deletion (60%), Trisomy 8 (62.5%), 20q deletion (59.5%) and TP53 deletion (55.5%). -[facility] Myeloid Report detected (Tier I) TP53 p.Fhy183Eid (VAF38%) and U2AF1 p.Hiq964Vnd (VAF32%). There are increased blasts/immature cells (12% on aspirate differential count and approximately 10-15% of the cellularity by CD34 immunohistochemical stain).  8/15/24:  Bone marrow biopsy and bone marrow aspirate  - Cellular marrow (30-40%) with markedly decreased myelopoiesis, decreased left shifted erythropoiesis and relatively normal megakaryopoiesis with no increase in blast population  - Persistent 7q deletion (1%), trisomy 8 (2.5%), 20q deletion (2.5%) and TP53 deletion (0.5%) by FISH studies, below the cut-off values  _ Persistent mutations in TP53 p.Uyn971Jqy and U2AF1 p.Nbj033Rvb at low VAF levels  10/15/24 (Pretransplant BmBx) ----Morphology: Normocellular bone marrow with trilineage regeneration (focal myeloid predominant, focal erythroid predominant, overall decreased megakaryocytes with focally normal numbers and normal morphology, and increased iron stores (history of MDS/AML with complex karyotype and TP53 deletion, under treatment)      - No morphologic or immunophenotypic findings of bone marrow involvement by leukemia are identified ----Karyotype: Normal ----FISH: deletion of TP53 below cut off value  ----NGS: Normal    - Day 30 post transplant: completed 12/12/24  ----Morphology: SANJEEV ----Karyotype: //46,XX[20] ----FISH: Normal FISH ----NGS: [facility] Advanced NGS Myeloid Panel is normal. No mutations identified.    - Day 100 post transplant: completed 2/19/25 ----Morphology: Trilineage hematopoiesis with maturation and increased iron stores. No morphologic or immunophenotypic features of bone marrow involvement by MDS/AML are identified. ----Karyotype:46,XX[20] ----FISH: Normal Fish ----NGS:[employer] NGS panel- no mutations identified.   - Day 180 post transplant: completed 5/6/25 ----Morphology: Normal cellularity with erythroid predominant trilineage hematopoiesis with maturation, increased hematogones, and increased iron stores (history of MDS/AML with mutated TP53, ICC/ MDS with biallelic TP53 inactivation, WHO-[id number] ----Karyotype: complex in 1  cell out of 20  ----FISH: shows 7q deletion, trisomy 8, TP53 deletion, and del 20q deletion, all below the cutoff values.     ----NGS: Normal   ---------Past Surgical Hx---------- Partial mastoidectomy and requires periodic left mastoid debridement  -------- -Social Hx ----------------- The patient is  and has 2 daughters, 30 and 29y/o local in [place]  Patient has one brother (69)  Work: Nephrologist  Born in: [place]  Never a smoker/Social alcohol use  ----------Past Family Hx:------------ Noncontributory  [FreeTextEntry1] : Phase I-II Study of High-Dose Post-Transplant Cyclophosphamide, Bortezomib, and Abatacept for the Prevention of Rjbxw-baczoc-Yadn Disease (GvHD) following Allogeneic Hematopoietic Stem Cell Transplantation (HSCT) [de-identified] : 6/18/25: Presents for post transplant follow up. He reports feeling very well, appetite stable and energy/activity continues to increase. He tolerated initiation of dacogen/venetoclax last week without any issue. Denies fever/chills, headache, chest pain, palpitations, SOB, cough, N/V/D, abd pain, swelling, rash dry eye, difficulty swallowing, stiff joints, or pain. Meds reviewed, taking appropriately.

## 2025-06-19 NOTE — HISTORY OF PRESENT ILLNESS
[Research Protocol] : Research Protocol  [90: Able to carry normal activity; minor signs or symptoms of disease.] : 90: Able to carry normal activity; minor signs or symptoms of disease.  [de-identified] : 71 year old s/p HSCT for AML    Status post allogeneic transplant, day +218 (11/12/24)  Transplant physician: Dr. Bay  Referring physician:  Diagnosis: AML  Disease staging at transplant: CR Conditioning regimen: LIV BuFluATG GvHD prophylaxis: PTCyBorAba (ABC) on study  Donor type: MUD                    Mismatches: N/A, 10/10                    Stem cell source: Peripheral  ABO         Recipient: A positive         Donor: O positive          Incompatibility: Minor  CMV Status  Recipient: Positive  Donor: Negative Toxoplasmosis Status  Recipient: Negative   Donor: Negative    PMH:  BPH, AML     -----------------Oncologic Hx:-------------------------------- He had been well, exercising regularly, three miles/day. By early 7/2024, he had developed NGUYEN on climbing stairs. Pancytopenia with circulating blasts were detected and he was admitted to [facility] 7/18/2024. 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 He was treated with decitabine and venetoclax x 3 cycles.    -----------Transplant hospital course:---------------------  Tolerated well, c/b increased BP due to post transplant cytoxan fluids. Additionally, c/b hematuria, interstitial mucositis.  -----Day 0 = 11/12/24  --------------Post Transplant Course:-----------------------   -------------Oncological treatments:-------------------------- Decitabine and venetoclax 100mg 21d   ---------------Bone Marrow Bx Results:---------------------- Bone Marrow Biopsy, Clot and Bone Marrow Aspirate, Right PIC -MDS/AML with mutated TP53 ( per International Consensus  Classification of Myeloid Neoplasms and Acute Leukemias) -MDS with biallelic TP53 inactivation (WHO-IGAC2aq ed). Hypercellular marrow for age with multilineage dysplasia, and increased blasts/immature cells (12% on aspirate differential count, 10-15% by CD34 IHC stain). -Abnormal Karyotype 46-47,XY,del(7)(q11.2),+8,del(8)(q13q22)x2,del(9)(q13q22),-11,-12, add(17)(p11.2),del(20)(q11.2q13.3),+1-2mar                        {cp13                                                                     \}/46,XY                        {7                                                                     \} -FISH studies detected 7q deletion (60%), Trisomy 8 (62.5%), 20q deletion (59.5%) and TP53 deletion (55.5%). -[facility] Myeloid Report detected (Tier I) TP53 p.Pgx325Ipo (VAF38%) and U2AF1 p.Uhx530Ajs (VAF32%). There are increased blasts/immature cells (12% on aspirate differential count and approximately 10-15% of the cellularity by CD34 immunohistochemical stain).  8/15/24:  Bone marrow biopsy and bone marrow aspirate  - Cellular marrow (30-40%) with markedly decreased myelopoiesis, decreased left shifted erythropoiesis and relatively normal megakaryopoiesis with no increase in blast population  - Persistent 7q deletion (1%), trisomy 8 (2.5%), 20q deletion (2.5%) and TP53 deletion (0.5%) by FISH studies, below the cut-off values  _ Persistent mutations in TP53 p.Zrk898Dld and U2AF1 p.Anu908Gxb at low VAF levels  10/15/24 (Pretransplant BmBx) ----Morphology: Normocellular bone marrow with trilineage regeneration (focal myeloid predominant, focal erythroid predominant, overall decreased megakaryocytes with focally normal numbers and normal morphology, and increased iron stores (history of MDS/AML with complex karyotype and TP53 deletion, under treatment)      - No morphologic or immunophenotypic findings of bone marrow involvement by leukemia are identified ----Karyotype: Normal ----FISH: deletion of TP53 below cut off value  ----NGS: Normal    - Day 30 post transplant: completed 12/12/24  ----Morphology: SANJEEV ----Karyotype: //46,XX[20] ----FISH: Normal FISH ----NGS: [facility] Advanced NGS Myeloid Panel is normal. No mutations identified.    - Day 100 post transplant: completed 2/19/25 ----Morphology: Trilineage hematopoiesis with maturation and increased iron stores. No morphologic or immunophenotypic features of bone marrow involvement by MDS/AML are identified. ----Karyotype:46,XX[20] ----FISH: Normal Fish ----NGS:[facility] NGS panel- no mutations identified.   - Day 180 post transplant: completed 5/6/25 ----Morphology: Normal cellularity with erythroid predominant trilineage hematopoiesis with maturation, increased hematogones, and increased iron stores (history of MDS/AML with mutated TP53, ICC/ MDS with biallelic TP53 inactivation, WHO-[id number] ----Karyotype: complex in 1  cell out of 20  ----FISH: shows 7q deletion, trisomy 8, TP53 deletion, and del 20q deletion, all below the cutoff values.     ----NGS: Normal   ---------Past Surgical Hx---------- Partial mastoidectomy and requires periodic left mastoid debridement  -------- -Social Hx ----------------- The patient is  and has 2 daughters, 30 and 27y/o local in [place]  Patient has one brother (69)  Work: Nephrologist  Born in: [place]  Never a smoker/Social alcohol use  ----------Past Family Hx:------------ Noncontributory  [FreeTextEntry1] : Phase I-II Study of High-Dose Post-Transplant Cyclophosphamide, Bortezomib, and Abatacept for the Prevention of Gcvpz-ultxbj-Jwgv Disease (GvHD) following Allogeneic Hematopoietic Stem Cell Transplantation (HSCT) [de-identified] : 6/18/25: Presents for post transplant follow up. He reports feeling very well, appetite stable and energy/activity continues to increase. He tolerated initiation of dacogen/venetoclax last week without any issue. Denies fever/chills, headache, chest pain, palpitations, SOB, cough, N/V/D, abd pain, swelling, rash dry eye, difficulty swallowing, stiff joints, or pain. Meds reviewed, taking appropriately.

## 2025-06-19 NOTE — ASSESSMENT
[FreeTextEntry1] : 71 yo with pmhx of AML, s/p HSCT transplant on 11/12/24. Today is day +218.   Disease:  --> AML s/p allogeneic stem cell transplant (MUD) ----------Ds status post transplant: cCR , now with MRD detected on BMBx 5/6/25 (FISH) ------------------By MRD monitoring: FISH ------------------By post Chimerism: full post transplant ----------Post transplant therapy: HMA/leonila (initiated 6/9/25) ------------------Indication: MRD detection on day +180 bmbx ----------DLI given: no, planned tentatively for Aug 2025   Disease monitoring: --> Post transplant BMBx on Day +30 (completed 12/12/24), Day+ 100 (2/19/25) with SANJEEV --> Day +180 BMBx (5/6/25) significant for FISH with 7q deletion, trisomy 8, TP53 deletion, and del 20q deletion, all below the cutoff values and  complex karyotype in 1 cell out of 20; no blasts and normal NGS -->Given cytogenetic relapse, plan for treatment followed by DLI ------Re-referred to Dr Anderson for treatment, initiated dacogen/venetoclax C1D1 6/9/25 ----------Cont f/u and transfusions PRN per heme team ------Requires donor re-collection, [facility] donor not available until Aug 2025 ----------Cont tx until that time, coordinate collection/infusion dates with treatment cycle -------Discussed with pt, likely repeat BMBx prior to DLI -------Pt and wife verbalize understanding of above plan   Engraftment: ------Chimerism: 11/26/24 >97% donor/recipient not detected. ------ANC engraftment date: 11/26/24 ------Platelets engraftment date: 11/29/24 ------ABO Rh incompatibility issues: none  -->Pancytopenia r/t chemotherapy +/- disease ------G-CSF support: no, HOLD given evidence of disease ----------Neutropenia ppx with posaconazole, levaquin ------Transfusions requirements: None at this time ------------Plt 13 today, scheduled for labs/possible transfusion Fri 6/20/25 ------Chimerism full post transplant, cont interval monitoring past day +180 given MRD detected -------------Last sent 5/6/25 >97% donor/no recipient detected on PB, 97% donor/3% recipient on marrow; repeat 6/20/25 ------Immune reconstitution next due on day +365 -------------CD4 80 on 5/6/25 (day +180), cont PCP ppx until at least +1 year    GvHD: -------Acute: No -------Chronic: N/A -------Steroid Taper: N/A -------ECP: N/A  ------Prophylaxis with ABC regimen, completed ------Reviewed GvHD signs and symptoms to report, none at this time    ID: ---Monitoring: CMV, Adenovirus and EBV PCR q visit until day +180 ----------CMV: negative to date; monitor while on treatment ----------EBV: reactivation s/p Ruxience x4 (2/5-2/25/25), now not quantifiable as of 3/5/25; cont to monitor ---------Adeno: negative to date; routine monitoring no longer indicated  --->Continue ppx: ---------PCP: Bactrim ---------HSV and VZV: acyclovir ---------Fungal: posaconazole completed per SOP, now resumed in setting of neutropenia ---------CMV: letermovir  completed per SOP ---------SOS prophylaxis: Ursodiol  completed per SOP,  ---------If chronic GvHD present, encapsulated organism coverage: N/A   Survivorship: - 25-hydroxyclaciferol (day +80): sent on 2/19/25 (20.8), not supplemented; Sent on 4/23/25 (20.9), recommend supplementation, discuss next visit - TSH (annually): - Ferritin (annually): - Fertility (annual, x2 if applicable): - Bone mineral densitometry (day +365 for women, men exposed to prolonged steroids and CNI): - Vaccinations         - HOLD BMT vaccines until 6mo post last rituximab per SOP, plan to initiate 8/6/25        - High dose flu vaccination annually, received on 3/19/25        - COVID vaccination per CDC guidelines, pt may obtain locally  Questions and concerns addressed. Reviewed signs and symptoms to report to clinic; patient has clinic and after hours number   RTC: 6/20 labs, possible plt transfusion 7/2 labs, provider visit resume f/u with Dr. Anderson for treatment/monitoring per lexus Cardenas NP Adult Transplantation and Cellular Therapy Program [facility]  ---------------------------------------------------------------------------------------------------------------------------- I saw the patient on day +218 following his allogeneic HSCT. I reviewed the data and the above note.  Diagnosis: AML Disease staging at transplant: CR Conditioning regimen: LIV Bu Flu rATG GvHD prophylaxis: ABC Donor type: MUD  Stem cell source: Peripheral ABO  Recipient: A positive  Donor: O positive  Incompatibility: Minor CMV Status  Recipient: Positive  Donor: Negative Toxoplasmosis Status  Recipient: Negative  Donor: Negative Since last seen, the patient received 1 cycle of decitabine and venetoclax.  Today, he had no complaints.  He has no evidence of cGVHD. He has no viral reactivation. He is on appropriate prophylaxis.  He will continue the current therapy. We plan on DLI in August based on the donor availability.  I discussed the above with the patient and his wife and answered their questions.  I spent a total of 35 minutes performing the above tasks. > 50% of that time was spent face-to-face with the patient.  MARISABEL Mena MD.

## 2025-06-19 NOTE — PHYSICAL EXAM
[Restricted in physically strenuous activity but ambulatory and able to carry out work of a light or sedentary nature] : Status 1- Restricted in physically strenuous activity but ambulatory and able to carry out work of a light or sedentary nature, e.g., light house work, office work [Thin] : thin [de-identified] : generalized xeroderma [Normal] : normal appearance, no rash, nodules, vesicles, ulcers, erythema

## 2025-06-19 NOTE — ASSESSMENT
[FreeTextEntry1] : 71 yo with pmhx of AML, s/p HSCT transplant on 11/12/24. Today is day +218.   Disease:  --> AML s/p allogeneic stem cell transplant (MUD) ----------Ds status post transplant: cCR , now with MRD detected on BMBx 5/6/25 (FISH) ------------------By MRD monitoring: FISH ------------------By post Chimerism: full post transplant ----------Post transplant therapy: HMA/leonila (initiated 6/9/25) ------------------Indication: MRD detection on day +180 bmbx ----------DLI given: no, planned tentatively for Aug 2025   Disease monitoring: --> Post transplant BMBx on Day +30 (completed 12/12/24), Day+ 100 (2/19/25) with SANJEEV --> Day +180 BMBx (5/6/25) significant for FISH with 7q deletion, trisomy 8, TP53 deletion, and del 20q deletion, all below the cutoff values and  complex karyotype in 1 cell out of 20; no blasts and normal NGS -->Given cytogenetic relapse, plan for treatment followed by DLI ------Re-referred to Dr Anderson for treatment, initiated dacogen/venetoclax C1D1 6/9/25 ----------Cont f/u and transfusions PRN per heme team ------Requires donor re-collection, [facility] donor not available until Aug 2025 ----------Cont tx until that time, coordinate collection/infusion dates with treatment cycle -------Discussed with pt, likely repeat BMBx prior to DLI -------Pt and wife verbalize understanding of above plan   Engraftment: ------Chimerism: 11/26/24 >97% donor/recipient not detected. ------ANC engraftment date: 11/26/24 ------Platelets engraftment date: 11/29/24 ------ABO Rh incompatibility issues: none  -->Pancytopenia r/t chemotherapy +/- disease ------G-CSF support: no, HOLD given evidence of disease ----------Neutropenia ppx with posaconazole, levaquin ------Transfusions requirements: None at this time ------------Plt 13 today, scheduled for labs/possible transfusion Fri 6/20/25 ------Chimerism full post transplant, cont interval monitoring past day +180 given MRD detected -------------Last sent 5/6/25 >97% donor/no recipient detected on PB, 97% donor/3% recipient on marrow; repeat 6/20/25 ------Immune reconstitution next due on day +365 -------------CD4 80 on 5/6/25 (day +180), cont PCP ppx until at least +1 year    GvHD: -------Acute: No -------Chronic: N/A -------Steroid Taper: N/A -------ECP: N/A  ------Prophylaxis with ABC regimen, completed ------Reviewed GvHD signs and symptoms to report, none at this time    ID: ---Monitoring: CMV, Adenovirus and EBV PCR q visit until day +180 ----------CMV: negative to date; monitor while on treatment ----------EBV: reactivation s/p Ruxience x4 (2/5-2/25/25), now not quantifiable as of 3/5/25; cont to monitor ---------Adeno: negative to date; routine monitoring no longer indicated  --->Continue ppx: ---------PCP: Bactrim ---------HSV and VZV: acyclovir ---------Fungal: posaconazole completed per SOP, now resumed in setting of neutropenia ---------CMV: letermovir  completed per SOP ---------SOS prophylaxis: Ursodiol  completed per SOP,  ---------If chronic GvHD present, encapsulated organism coverage: N/A   Survivorship: - 25-hydroxyclaciferol (day +80): sent on 2/19/25 (20.8), not supplemented; Sent on 4/23/25 (20.9), recommend supplementation, discuss next visit - TSH (annually): - Ferritin (annually): - Fertility (annual, x2 if applicable): - Bone mineral densitometry (day +365 for women, men exposed to prolonged steroids and CNI): - Vaccinations         - HOLD BMT vaccines until 6mo post last rituximab per SOP, plan to initiate 8/6/25        - High dose flu vaccination annually, received on 3/19/25        - COVID vaccination per CDC guidelines, pt may obtain locally  Questions and concerns addressed. Reviewed signs and symptoms to report to clinic; patient has clinic and after hours number   RTC: 6/20 labs, possible plt transfusion 7/2 labs, provider visit resume f/u with Dr. Anderson for treatment/monitoring per lexus Cardenas NP Adult Transplantation and Cellular Therapy Program [facility]  ---------------------------------------------------------------------------------------------------------------------------- I saw the patient on day +218 following his allogeneic HSCT. I reviewed the data and the above note.  Diagnosis: AML Disease staging at transplant: CR Conditioning regimen: LIV Bu Flu rATG GvHD prophylaxis: ABC Donor type: MUD  Stem cell source: Peripheral ABO  Recipient: A positive  Donor: O positive  Incompatibility: Minor CMV Status  Recipient: Positive  Donor: Negative Toxoplasmosis Status  Recipient: Negative  Donor: Negative Since last seen, the patient received 1 cycle of decitabine and venetoclax.  Today, he had no complaints.  He has no evidence of cGVHD. He has no viral reactivation. He is on appropriate prophylaxis.  He will continue the current therapy. We plan on DLI in August based on the donor availability.  I discussed the above with the patient and his wife and answered their questions.  I spent a total of 35 minutes performing the above tasks. > 50% of that time was spent face-to-face with the patient.  MARISABEL Mena MD.

## 2025-06-26 NOTE — HISTORY OF PRESENT ILLNESS
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  He is s/p cycle 1 decitabine and venetoclax. He had been well, exercising regularly, doing HIIT workouts, when he began to feel increasingly fatigued mid 6/2024. By early 7/2024, he had developed NGUYEN on climbing stairs. Pancytopenia with circulating blasts were detected and he was admitted to [facility] 7/18/2024. BMA/Bx:  Final Diagnosis Bone Marrow Biopsy, Clot and Bone Marrow Aspirate, Right PIC -MDS/AML with mutated TP53 ( per International Consensus   Classification of Myeloid Neoplasms and Acute Leukemias) -MDS with biallelic TP53 inactivation (WHO-ZIWI9wp ed). Hypercellular marrow for age with multilineage dysplasia, and increased blasts/immature cells (12% on aspirate differential  count, 10-15% by CD34 IHC stain). -Abnormal Karyotype 46-47,XY,del(7)(q11.2),+8,del(8)(q13q22)x2,del(9)(q13q22),-11,-12, add(17)(p11.2),del(20)(q11.2q13.3),+1-2mar     {cp13           }/46,XY     {7        } -FISH studies detected 7q deletion (60%), Trisomy 8  (62.5%), 20q deletion (59.5%) and TP53 deletion (55.5%). -[facility] NGS Myeloid Report detected (Tier I) TP53 p.Zjo061Stx (VAF38%) and U2AF1 p.Xjo920Uwd (VAF32%). There are increased blasts/immature cells (12% on aspirate differential count and approximately 10-15% of the cellularity by CD34 immunohistochemical stain).  CT C/A/P showed small, non specific lung nodules and prostatomegaly.  Treatment with decitabine and venetoclax was started on 7/24/24 and whas been well tolerated. He was discharged home on antibiotic prophylaxis on 7/30. He has no GI c/o, mucositis, dysphagia; he is taking po well.  He has past h/o partial mastoidectomy and requires periodic left mastoid debridement.           [de-identified] : MDS/AML with biallelic P53 mut and complex karyotype [de-identified] : biallelic P53 mutation U2AF1 mutation complex karyotype monosomal karyotype [FreeTextEntry1] : decitabine and venetoclax  C 1 6/9/25 [de-identified] : AML s/p HSCT for AML day 0 _ 11/12/24  Day 180 post transplant: completed 5/6/25 ----Morphology: Normal cellularity with erythroid predominant trilineage hematopoiesis with maturation, increased hematogones, and increased iron stores (history of MDS/AML with mutated TP53, ICC/ MDS with biallelic TP53 inactivation, [id number] ----Karyotype: ----FISH: shows 7q deletion, trisomy 8, TP53 deletion, and del 20q deletion, all below the cutoff values. ----NGS: Normal -Past Surgical Hx---------- Partial mastoidectomy and requires periodic left mastoid debridement here for follow up feeling entirely fine walking 2 miles a day , Neutropenia on Levaquin 500 mg daily with posaconazole 100 mg daily denies fever, chills body ach  Plts 15 today s/p plt infusion denies bruising or bleeding  plan for DLI early August

## 2025-06-26 NOTE — HISTORY OF PRESENT ILLNESS
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  He is s/p cycle 1 decitabine and venetoclax. He had been well, exercising regularly, doing HIIT workouts, when he began to feel increasingly fatigued mid 6/2024. By early 7/2024, he had developed NGUYEN on climbing stairs. Pancytopenia with circulating blasts were detected and he was admitted to [facility] 7/18/2024. BMA/Bx:  Final Diagnosis Bone Marrow Biopsy, Clot and Bone Marrow Aspirate, Right PIC -MDS/AML with mutated TP53 ( per International Consensus   Classification of Myeloid Neoplasms and Acute Leukemias) -MDS with biallelic TP53 inactivation (WHO-VHNG3ty ed). Hypercellular marrow for age with multilineage dysplasia, and increased blasts/immature cells (12% on aspirate differential  count, 10-15% by CD34 IHC stain). -Abnormal Karyotype 46-47,XY,del(7)(q11.2),+8,del(8)(q13q22)x2,del(9)(q13q22),-11,-12, add(17)(p11.2),del(20)(q11.2q13.3),+1-2mar     {cp13           }/46,XY     {7        } -FISH studies detected 7q deletion (60%), Trisomy 8  (62.5%), 20q deletion (59.5%) and TP53 deletion (55.5%). -[facility] NGS Myeloid Report detected (Tier I) TP53 p.Qsq253Tmo (VAF38%) and U2AF1 p.Mxf405Yph (VAF32%). There are increased blasts/immature cells (12% on aspirate differential count and approximately 10-15% of the cellularity by CD34 immunohistochemical stain).  CT C/A/P showed small, non specific lung nodules and prostatomegaly.  Treatment with decitabine and venetoclax was started on 7/24/24 and whas been well tolerated. He was discharged home on antibiotic prophylaxis on 7/30. He has no GI c/o, mucositis, dysphagia; he is taking po well.  He has past h/o partial mastoidectomy and requires periodic left mastoid debridement.           [de-identified] : MDS/AML with biallelic P53 mut and complex karyotype [de-identified] : biallelic P53 mutation U2AF1 mutation complex karyotype monosomal karyotype [FreeTextEntry1] : decitabine and venetoclax  C 1 6/9/25 [de-identified] : AML s/p HSCT for AML day 0 _ 11/12/24  Day 180 post transplant: completed 5/6/25 ----Morphology: Normal cellularity with erythroid predominant trilineage hematopoiesis with maturation, increased hematogones, and increased iron stores (history of MDS/AML with mutated TP53, ICC/ MDS with biallelic TP53 inactivation, [id number] ----Karyotype: ----FISH: shows 7q deletion, trisomy 8, TP53 deletion, and del 20q deletion, all below the cutoff values. ----NGS: Normal -Past Surgical Hx---------- Partial mastoidectomy and requires periodic left mastoid debridement here for follow up feeling entirely fine walking 2 miles a day , Neutropenia on Levaquin 500 mg daily with posaconazole 100 mg daily denies fever, chills body ach  Plts 15 today s/p plt infusion denies bruising or bleeding  plan for DLI early August

## 2025-06-26 NOTE — ASSESSMENT
[Curative] : Goals of care discussed with patient: Curative [FreeTextEntry1] : 70 yo with pmhx of AML, s/p HSCT transplant on 11/12/24. 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 plan 6/9 started  Dacogen and Venectolax to stop Venectolax d 21 today is D  check CMP LDH  office visit every other week  Engraftment: ------Chimerism: 11/26/24 >97% donor/recipient not detected. ------ANC engraftment date: 11/26/24 ------------ANC down trending - 1.73 (5/16), 1.35 (5/20), 0.80 on (6/3) - continue to monitor ------Platelets engraftment date: 11/29/24, last received platelets on 11/22/24.  ------ABO Rh incompatibility issues: none  ------G-CSF support: no ------Transfusions requirements: No ------Chimerism full; monitor every other week and on marrow until d+180 ------------ 3/19/25 >97% donor/recipient not detected, 4/8/25 >97% donor/recipient not detected, 5/6/25 peripheral blood >97% donor/recipient not detected. 5/6/25 bone marrow biopsy= 97% donor, 3% recipient. ------Immune reconstitution due on days +365  ------Prophylaxis with ABC regimen, completed ------Reviewed GvHD signs and symptoms to report, none at this time    ID: ---Monitoring: Send CMV, Adenovirus and EBV PCR q visit  q week  ----------CMV: negative to date ----------EBV: reactivation s/p Ruxience x4 (2/5-2/25/25); PCR now remains not quantifiable as of 3/5/25, last not detected 4/23/25

## 2025-07-03 NOTE — REVIEW OF SYSTEMS
[Negative] : Heme/Lymph [Fever] : no fever [Chills] : no chills [Night Sweats] : no night sweats [Fatigue] : no fatigue [Recent Change In Weight] : ~T no recent weight change [Eye Pain] : no eye pain [Red Eyes] : eyes not red [Dry Eyes] : no dryness of the eyes [Vision Problems] : no vision problems [Nosebleeds] : no nosebleeds [Shortness Of Breath] : no shortness of breath [Wheezing] : no wheezing [Cough] : no cough [SOB on Exertion] : no shortness of breath during exertion [Vomiting] : no vomiting [Constipation] : no constipation [Dysuria] : no dysuria [Skin Rash] : no skin rash [Skin Wound] : no skin wound

## 2025-07-03 NOTE — HISTORY OF PRESENT ILLNESS
[Research Protocol] : Research Protocol  [90: Able to carry normal activity; minor signs or symptoms of disease.] : 90: Able to carry normal activity; minor signs or symptoms of disease.  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 Bay  Referring physician:  Diagnosis: AML  Disease staging at transplant: CR Conditioning regimen: LIV BuFluATG GvHD prophylaxis: PTCyBorAba (ABC) on study  Donor type: MUD                    Mismatches: N/A, 10/10                    Stem cell source: Peripheral  ABO         Recipient: A positive         Donor: O positive          Incompatibility: Minor  CMV Status  Recipient: Positive  Donor: Negative Toxoplasmosis Status  Recipient: Negative   Donor: Negative    PMH:  BPH, AML     -----------------Oncologic Hx:-------------------------------- He had been well, exercising regularly, three miles/day. By early 7/2024, he had developed NGUYEN on climbing stairs. Pancytopenia with circulating blasts were detected and he was admitted to [facility] 7/18/2024. 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 He was treated with decitabine and venetoclax x 3 cycles.    -----------Transplant hospital course:---------------------  Tolerated well, c/b increased BP due to post transplant cytoxan fluids. Additionally, c/b hematuria, interstitial mucositis.  -----Day 0 = 11/12/24  --------------Post Transplant Course:-----------------------   -------------Oncological treatments:-------------------------- Decitabine and venetoclax 100mg 21d   ---------------Bone Marrow Bx Results:---------------------- Bone Marrow Biopsy, Clot and Bone Marrow Aspirate, Right PIC -MDS/AML with mutated TP53 ( per International Consensus  Classification of Myeloid Neoplasms and Acute Leukemias) -MDS with biallelic TP53 inactivation (WHO-NAOK7lm ed). Hypercellular marrow for age with multilineage dysplasia, and increased blasts/immature cells (12% on aspirate differential count, 10-15% by CD34 IHC stain). -Abnormal Karyotype 46-47,XY,del(7)(q11.2),+8,del(8)(q13q22)x2,del(9)(q13q22),-11,-12, add(17)(p11.2),del(20)(q11.2q13.3),+1-2mar                            {cp13                                                                             }/46,XY                            {7                                                                             } -FISH studies detected 7q deletion (60%), Trisomy 8 (62.5%), 20q deletion (59.5%) and TP53 deletion (55.5%). -Onko[employer] Advanced NGS Myeloid Report detected (Tier I) TP53 p.[id number] (VAF38%) and U2AF1 p.Qoa518Vwb (VAF32%). There are increased blasts/immature cells (12% on aspirate differential count and approximately 10-15% of the cellularity by CD34 immunohistochemical stain).  8/15/24:  Bone marrow biopsy and bone marrow aspirate  - Cellular marrow (30-40%) with markedly decreased myelopoiesis, decreased left shifted erythropoiesis and relatively normal megakaryopoiesis with no increase in blast population  - Persistent 7q deletion (1%), trisomy 8 (2.5%), 20q deletion (2.5%) and TP53 deletion (0.5%) by FISH studies, below the cut-off values  _ Persistent mutations in TP53 p.[id number] and U2AF1 p.[id number] at low VAF levels  10/15/24 (Pretransplant BmBx) ----Morphology: Normocellular bone marrow with trilineage regeneration (focal myeloid predominant, focal erythroid predominant, overall decreased megakaryocytes with focally normal numbers and normal morphology, and increased iron stores (history of MDS/AML with complex karyotype and TP53 deletion, under treatment)      - No morphologic or immunophenotypic findings of bone marrow involvement by leukemia are identified ----Karyotype: Normal ----FISH: deletion of TP53 below cut off value  ----NGS: Normal    - Day 30 post transplant: completed 12/12/24  ----Morphology: SANJEEV ----Karyotype: //46,XX[20] ----FISH: Normal FISH ----NGS: [employer] Advanced NGS Myeloid Panel is normal. No mutations identified.    - Day 100 post transplant: completed 2/19/25 ----Morphology: Trilineage hematopoiesis with maturation and increased iron stores. No morphologic or immunophenotypic features of bone marrow involvement by MDS/AML are identified. ----Karyotype:46,XX[20] ----FISH: Normal Fish ----NGS:[employer] NGS panel- no mutations identified.   - Day 180 post transplant: completed 5/6/25 ----Morphology: Normal cellularity with erythroid predominant trilineage hematopoiesis with maturation, increased hematogones, and increased iron stores (history of MDS/AML with mutated TP53, ICC/ MDS with biallelic TP53 inactivation, [id number] ----Karyotype: complex in 1  cell out of 20  ----FISH: shows 7q deletion, trisomy 8, TP53 deletion, and del 20q deletion, all below the cutoff values.     ----NGS: Normal   ---------Past Surgical Hx---------- Partial mastoidectomy and requires periodic left mastoid debridement  -------- -Social Hx ----------------- The patient is  and has 2 daughters, 30 and 29y/o local in [place]  Patient has one brother (69)  Work: Nephrologist  Born in: [place]  Never a smoker/Social alcohol use  ----------Past Family Hx:------------ Noncontributory  [FreeTextEntry1] : Phase I-II Study of High-Dose Post-Transplant Cyclophosphamide, Bortezomib, and Abatacept for the Prevention of Vgrxm-muobyd-Gcvu Disease (GvHD) following Allogeneic Hematopoietic Stem Cell Transplantation (HSCT) [de-identified] : 7/2/25: Presents for day +231, post-transplant f/u. Endorses no acute concerns at this time. Energy and appetite are stable. Continues to regularly exercise. Tolerating dacogen/venetoclax - day 25. Denies fever/chills, headache, chest pain, palpitations, SOB, cough, N/V/D, abd pain, swelling, rash dry eye, difficulty swallowing, stiff joints, or pain. Meds reviewed, taking appropriately.

## 2025-07-03 NOTE — ASSESSMENT
[FreeTextEntry1] : 71 yo with pmhx of AML, s/p HSCT transplant on 11/12/24. Today is day +231.   Disease:  --> AML s/p allogeneic stem cell transplant (MUD) ----------Ds status post transplant: cCR , now with MRD detected on BMBx 5/6/25 (FISH) ------------------By MRD monitoring: FISH ------------------By post Chimerism: full post transplant, now 70%D, 30%R (6/20/25) ----------Post transplant therapy: HMA/leonila (initiated 6/9/25) ------------------Indication: MRD detection on day +180 bmbx ----------DLI given: no, planned tentatively for Aug 5, 2025   Disease monitoring: --> Post transplant BMBx on Day +30 (completed 12/12/24), Day+ 100 (2/19/25) with SANJEEV --> Day +180 BMBx (5/6/25) significant for FISH with 7q deletion, trisomy 8, TP53 deletion, and del 20q deletion, all below the cutoff values and  complex karyotype in 1 cell out of 20; no blasts and normal NGS -->Given cytogenetic relapse, plan for treatment followed by DLI ------Re-referred to Dr Anderson for treatment, initiated dacogen/venetoclax C1D1 6/9/25 ----------Cont f/u and transfusions PRN per heme team ------[facility] ----------Cont tx until that time, coordinate collection/infusion dates with treatment cycle -------Discussed with pt, likely repeat BMBx prior to DLI -------Pt and wife verbalize understanding of above plan   Engraftment: ------Chimerism: 11/26/24 >97% donor/recipient not detected. ------ANC engraftment date: 11/26/24 ------Platelets engraftment date: 11/29/24 ------ABO Rh incompatibility issues: none  -->Pancytopenia r/t chemotherapy +/- disease ------G-CSF support: no, HOLD given evidence of disease ----------Neutropenia ppx with posaconazole, levaquin ------Transfusions requirements: None at this time ------------Plt 13 today, scheduled for labs/possible transfusion Fri 6/20/25 ------Chimerism full post transplant, cont interval monitoring past day +180 given MRD detected -------------Last sent 5/6/25 >97% donor/no recipient detected on PB, 97% donor/3% recipient on marrow; repeat 6/20/25 ------Immune reconstitution next due on day +365 -------------CD4 80 on 5/6/25 (day +180), cont PCP ppx until at least +1 year    GvHD: -------Acute: No -------Chronic: N/A -------Steroid Taper: N/A -------ECP: N/A  ------Prophylaxis with ABC regimen, completed ------Reviewed GvHD signs and symptoms to report, none at this time    ID: ---Monitoring: CMV, Adenovirus and EBV PCR q visit until day +180 ----------CMV: negative to date; monitor while on treatment ----------EBV: reactivation s/p Ruxience x4 (2/5-2/25/25), now not quantifiable as of 3/5/25; cont to monitor ---------Adeno: negative to date; routine monitoring no longer indicated  --->Continue ppx: ---------PCP: Bactrim ---------HSV and VZV: acyclovir ---------Fungal: posaconazole completed per SOP, now resumed in setting of neutropenia ---------CMV: letermovir  completed per SOP ---------SOS prophylaxis: Ursodiol  completed per SOP,  ---------If chronic GvHD present, encapsulated organism coverage: N/A   Survivorship: - 25-hydroxyclaciferol (day +80): sent on 2/19/25 (20.8), not supplemented; Sent on 4/23/25 (20.9), recommend supplementation, discuss next visit - TSH (annually): - Ferritin (annually): - Fertility (annual, x2 if applicable): - Bone mineral densitometry (day +365 for women, men exposed to prolonged steroids and CNI): - Vaccinations         - HOLD BMT vaccines until 6mo post last rituximab per SOP, plan to initiate 8/6/25        - High dose flu vaccination annually, received on 3/19/25        - COVID vaccination per CDC guidelines, pt may obtain locally  Questions and concerns addressed. Reviewed signs and symptoms to report to clinic; patient has clinic and after hours number   RTC: 7/2 labs, provider visit resume f/u with Dr. Anderson for treatment/monitoring per lexus Heller NP Adult Transplantation and Cellular Therapy Program [facility]  ---------------------------------------------------------------------------------------------------------------------------- I saw the patient on day +231 following his allogeneic HSCT. I reviewed the data and the above note.  Diagnosis: AML Disease staging at transplant: CR Conditioning regimen: LIV Bu Flu rATG GvHD prophylaxis: ABC Donor type: MUD  Stem cell source: Peripheral ABO  Recipient: A positive  Donor: O positive  Incompatibility: Minor CMV Status  Recipient: Positive  Donor: Negative Toxoplasmosis Status  Recipient: Negative  Donor: Negative  I saw the patient briefly. He is doing well receiving transfusion support after his 2nd cycle of therapy.  The pan at this point is to repeat his BM studies early August and proceed with DLI thereafter. I answered the patient's questions. I spent a total of 20 minutes performing the above tasks.  MD MARISABEL Salinas MD.

## 2025-07-03 NOTE — ASSESSMENT
[FreeTextEntry1] : 73 yo with pmhx of AML, s/p HSCT transplant on 11/12/24. Today is day +231.   Disease:  --> AML s/p allogeneic stem cell transplant (MUD) ----------Ds status post transplant: cCR , now with MRD detected on BMBx 5/6/25 (FISH) ------------------By MRD monitoring: FISH ------------------By post Chimerism: full post transplant, now 70%D, 30%R (6/20/25) ----------Post transplant therapy: HMA/leonila (initiated 6/9/25) ------------------Indication: MRD detection on day +180 bmbx ----------DLI given: no, planned tentatively for Aug 5, 2025   Disease monitoring: --> Post transplant BMBx on Day +30 (completed 12/12/24), Day+ 100 (2/19/25) with SANJEEV --> Day +180 BMBx (5/6/25) significant for FISH with 7q deletion, trisomy 8, TP53 deletion, and del 20q deletion, all below the cutoff values and  complex karyotype in 1 cell out of 20; no blasts and normal NGS -->Given cytogenetic relapse, plan for treatment followed by DLI ------Re-referred to Dr Anderson for treatment, initiated dacogen/venetoclax C1D1 6/9/25 ----------Cont f/u and transfusions PRN per heme team ------[facility] ----------Cont tx until that time, coordinate collection/infusion dates with treatment cycle -------Discussed with pt, likely repeat BMBx prior to DLI -------Pt and wife verbalize understanding of above plan   Engraftment: ------Chimerism: 11/26/24 >97% donor/recipient not detected. ------ANC engraftment date: 11/26/24 ------Platelets engraftment date: 11/29/24 ------ABO Rh incompatibility issues: none  -->Pancytopenia r/t chemotherapy +/- disease ------G-CSF support: no, HOLD given evidence of disease ----------Neutropenia ppx with posaconazole, levaquin ------Transfusions requirements: None at this time ------------Plt 13 today, scheduled for labs/possible transfusion Fri 6/20/25 ------Chimerism full post transplant, cont interval monitoring past day +180 given MRD detected -------------Last sent 5/6/25 >97% donor/no recipient detected on PB, 97% donor/3% recipient on marrow; repeat 6/20/25 ------Immune reconstitution next due on day +365 -------------CD4 80 on 5/6/25 (day +180), cont PCP ppx until at least +1 year    GvHD: -------Acute: No -------Chronic: N/A -------Steroid Taper: N/A -------ECP: N/A  ------Prophylaxis with ABC regimen, completed ------Reviewed GvHD signs and symptoms to report, none at this time    ID: ---Monitoring: CMV, Adenovirus and EBV PCR q visit until day +180 ----------CMV: negative to date; monitor while on treatment ----------EBV: reactivation s/p Ruxience x4 (2/5-2/25/25), now not quantifiable as of 3/5/25; cont to monitor ---------Adeno: negative to date; routine monitoring no longer indicated  --->Continue ppx: ---------PCP: Bactrim ---------HSV and VZV: acyclovir ---------Fungal: posaconazole completed per SOP, now resumed in setting of neutropenia ---------CMV: letermovir  completed per SOP ---------SOS prophylaxis: Ursodiol  completed per SOP,  ---------If chronic GvHD present, encapsulated organism coverage: N/A   Survivorship: - 25-hydroxyclaciferol (day +80): sent on 2/19/25 (20.8), not supplemented; Sent on 4/23/25 (20.9), recommend supplementation, discuss next visit - TSH (annually): - Ferritin (annually): - Fertility (annual, x2 if applicable): - Bone mineral densitometry (day +365 for women, men exposed to prolonged steroids and CNI): - Vaccinations         - HOLD BMT vaccines until 6mo post last rituximab per SOP, plan to initiate 8/6/25        - High dose flu vaccination annually, received on 3/19/25        - COVID vaccination per CDC guidelines, pt may obtain locally  Questions and concerns addressed. Reviewed signs and symptoms to report to clinic; patient has clinic and after hours number   RTC: 7/2 labs, provider visit resume f/u with Dr. Anderson for treatment/monitoring per lexus Heller NP Adult Transplantation and Cellular Therapy Program [facility]  ---------------------------------------------------------------------------------------------------------------------------- I saw the patient on day +231 following his allogeneic HSCT. I reviewed the data and the above note.  Diagnosis: AML Disease staging at transplant: CR Conditioning regimen: LIV Bu Flu rATG GvHD prophylaxis: ABC Donor type: MUD  Stem cell source: Peripheral ABO  Recipient: A positive  Donor: O positive  Incompatibility: Minor CMV Status  Recipient: Positive  Donor: Negative Toxoplasmosis Status  Recipient: Negative  Donor: Negative  I saw the patient briefly. He is doing well receiving transfusion support after his 2nd cycle of therapy.  The pan at this point is to repeat his BM studies early August and proceed with DLI thereafter. I answered the patient's questions. I spent a total of 20 minutes performing the above tasks.  MD MARISABEL Salinas MD.

## 2025-07-03 NOTE — HISTORY OF PRESENT ILLNESS
[Research Protocol] : Research Protocol  [90: Able to carry normal activity; minor signs or symptoms of disease.] : 90: Able to carry normal activity; minor signs or symptoms of disease.  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 Bay  Referring physician:  Diagnosis: AML  Disease staging at transplant: CR Conditioning regimen: LIV BuFluATG GvHD prophylaxis: PTCyBorAba (ABC) on study  Donor type: MUD                    Mismatches: N/A, 10/10                    Stem cell source: Peripheral  ABO         Recipient: A positive         Donor: O positive          Incompatibility: Minor  CMV Status  Recipient: Positive  Donor: Negative Toxoplasmosis Status  Recipient: Negative   Donor: Negative    PMH:  BPH, AML     -----------------Oncologic Hx:-------------------------------- He had been well, exercising regularly, three miles/day. By early 7/2024, he had developed NGUYEN on climbing stairs. Pancytopenia with circulating blasts were detected and he was admitted to [facility] 7/18/2024. 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 He was treated with decitabine and venetoclax x 3 cycles.    -----------Transplant hospital course:---------------------  Tolerated well, c/b increased BP due to post transplant cytoxan fluids. Additionally, c/b hematuria, interstitial mucositis.  -----Day 0 = 11/12/24  --------------Post Transplant Course:-----------------------   -------------Oncological treatments:-------------------------- Decitabine and venetoclax 100mg 21d   ---------------Bone Marrow Bx Results:---------------------- Bone Marrow Biopsy, Clot and Bone Marrow Aspirate, Right PIC -MDS/AML with mutated TP53 ( per International Consensus  Classification of Myeloid Neoplasms and Acute Leukemias) -MDS with biallelic TP53 inactivation (WHO-KIDM3cz ed). Hypercellular marrow for age with multilineage dysplasia, and increased blasts/immature cells (12% on aspirate differential count, 10-15% by CD34 IHC stain). -Abnormal Karyotype 46-47,XY,del(7)(q11.2),+8,del(8)(q13q22)x2,del(9)(q13q22),-11,-12, add(17)(p11.2),del(20)(q11.2q13.3),+1-2mar                            {cp13                                                                             }/46,XY                            {7                                                                             } -FISH studies detected 7q deletion (60%), Trisomy 8 (62.5%), 20q deletion (59.5%) and TP53 deletion (55.5%). -OnkoSit Advanced NGS Myeloid Report detected (Tier I) TP53 p.Nij121Iib (VAF38%) and U2AF1 p.Map792Zpa (VAF32%). There are increased blasts/immature cells (12% on aspirate differential count and approximately 10-15% of the cellularity by CD34 immunohistochemical stain).  8/15/24:  Bone marrow biopsy and bone marrow aspirate  - Cellular marrow (30-40%) with markedly decreased myelopoiesis, decreased left shifted erythropoiesis and relatively normal megakaryopoiesis with no increase in blast population  - Persistent 7q deletion (1%), trisomy 8 (2.5%), 20q deletion (2.5%) and TP53 deletion (0.5%) by FISH studies, below the cut-off values  _ Persistent mutations in TP53 p.Nbb038Fpk and U2AF1 p.Rwa370Zjz at low VAF levels  10/15/24 (Pretransplant BmBx) ----Morphology: Normocellular bone marrow with trilineage regeneration (focal myeloid predominant, focal erythroid predominant, overall decreased megakaryocytes with focally normal numbers and normal morphology, and increased iron stores (history of MDS/AML with complex karyotype and TP53 deletion, under treatment)      - No morphologic or immunophenotypic findings of bone marrow involvement by leukemia are identified ----Karyotype: Normal ----FISH: deletion of TP53 below cut off value  ----NGS: Normal    - Day 30 post transplant: completed 12/12/24  ----Morphology: SANJEEV ----Karyotype: //46,XX[20] ----FISH: Normal FISH ----NGS: [employer] Advanced NGS Myeloid Panel is normal. No mutations identified.    - Day 100 post transplant: completed 2/19/25 ----Morphology: Trilineage hematopoiesis with maturation and increased iron stores. No morphologic or immunophenotypic features of bone marrow involvement by MDS/AML are identified. ----Karyotype:46,XX[20] ----FISH: Normal Fish ----NGS:[employer] NGS panel- no mutations identified.   - Day 180 post transplant: completed 5/6/25 ----Morphology: Normal cellularity with erythroid predominant trilineage hematopoiesis with maturation, increased hematogones, and increased iron stores (history of MDS/AML with mutated TP53, ICC/ MDS with biallelic TP53 inactivation, [id number] ----Karyotype: complex in 1  cell out of 20  ----FISH: shows 7q deletion, trisomy 8, TP53 deletion, and del 20q deletion, all below the cutoff values.     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 Endorses no acute concerns at this time. Energy and appetite are stable. Continues to regularly exercise. Tolerating dacogen/venetoclax - day 25. Denies fever/chills, headache, chest pain, palpitations, SOB, cough, N/V/D, abd pain, swelling, rash dry eye, difficulty swallowing, stiff joints, or pain. Meds reviewed, taking appropriately.

## 2025-07-13 NOTE — ASSESSMENT
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  Recurrence by karyotype and FISH findings last chimerism 97.5% donor s/p decitabine/venetoclax salvage cycle, day 30 received 21 days of venetoclax CBC results reviewed and d/w pt and wife WBC 0.26, Hgb 8.2, plt 22K s/p 3 units PRBC for Hgb 5.4 on 7/1 no sxs bleeding EBV reactivation treated with rituximab x 4 2/2025 EBV PCR now (-) 7/1/25 continue ppx abs - posa, levaquin, acyclovir, bactrim admit if febrile check CBC, tx plts, PRBC as indicated on 7/11/25 DLI planned for first week of 8/2025

## 2025-07-13 NOTE — HISTORY OF PRESENT ILLNESS
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  He is s/p cycle 1 decitabine and venetoclax. He had been well, exercising regularly, doing HIIT workouts, when he began to feel increasingly fatigued mid 6/2024. By early 7/2024, he had developed NGUYEN on climbing stairs. Pancytopenia with circulating blasts were detected and he was admitted to [facility] 7/18/2024. BMA/Bx:  Final Diagnosis Bone Marrow Biopsy, Clot and Bone Marrow Aspirate, Right PIC -MDS/AML with mutated TP53 ( per International Consensus   Classification of Myeloid Neoplasms and Acute Leukemias) -MDS with biallelic TP53 inactivation (WHO-BXEL9qs ed). Hypercellular marrow for age with multilineage dysplasia, and increased blasts/immature cells (12% on aspirate differential  count, 10-15% by CD34 IHC stain). -Abnormal Karyotype 46-47,XY,del(7)(q11.2),+8,del(8)(q13q22)x2,del(9)(q13q22),-11,-12, add(17)(p11.2),del(20)(q11.2q13.3),+1-2mar      {cp13             }/46,XY      {7          } -FISH studies detected 7q deletion (60%), Trisomy 8  (62.5%), 20q deletion (59.5%) and TP53 deletion (55.5%). -[facility] Myeloid Report detected (Tier I) TP53 p.Svo513Eno (VAF38%) and U2AF1 p.Ptg442Wmf (VAF32%). There are increased blasts/immature cells (12% on aspirate differential count and approximately 10-15% of the cellularity by CD34 immunohistochemical stain).  CT C/A/P showed small, non specific lung nodules and prostatomegaly.  Treatment with decitabine and venetoclax was started on 7/24/24 and whas been well tolerated. He was discharged home on antibiotic prophylaxis on 7/30. He has no GI c/o, mucositis, dysphagia; he is taking po well.  He has past h/o partial mastoidectomy and requires periodic left mastoid debridement.           [de-identified] : MDS/AML with biallelic P53 mut and complex karyotype [de-identified] : biallelic P53 mutation U2AF1 mutation complex karyotype monosomal karyotype [FreeTextEntry1] : decitabine and venetoclax  C 1 6/9/25 [de-identified] : AML s/p HSCT for AML day 0 _ 11/12/24  Day 180 post-transplant:5/6/25 BMA/Bx 5/6/25- Morphology: Normal cellularity with erythroid predominant trilineage hematopoiesis with maturation, increased hematogones, and increased iron stores (history of MDS/AML with mutated TP53, ICC/ MDS with biallelic TP53 inactivation, [id number] Karyotype: 46,XY,t(1;16)(q21;p11.2),del(7)(q11.2),+8,del(8)(q13q22)x2,del(9)(q13q22),-11,-12,add(17)(p11.2),del(20) (q11.2q13.3),+mar[cp2]//46,XX[19] FISH: shows 7q deletion, trisomy 8, TP53 deletion, and del 20q deletion, all below the cutoff values. ----NGS: Normal chimerism: 97.5% donor EBV, CMV PCR (-) on 7/1/25 s/p cycle of dac/leonila 6/9-6/13/25, day 30 mod fatigue severe pancytopenia, afebrile of ppx antibiotics

## 2025-07-13 NOTE — ASSESSMENT
[Curative] : Goals of care discussed with patient: Curative [FreeTextEntry1] : 73 yo with TP53 mut AML, s/p allo HSCT transplant on 11/12/24.  Recurrence by karyotype and FISH findings last chimerism 97.5% donor s/p decitabine/venetoclax salvage cycle, day 30 received 21 days of venetoclax CBC results reviewed and d/w pt and wife WBC 0.26, Hgb 8.2, plt 22K s/p 3 units PRBC for Hgb 5.4 on 7/1 no sxs bleeding EBV reactivation treated with rituximab x 4 2/2025 EBV PCR now (-) 7/1/25 continue ppx abs - posa, levaquin, acyclovir, bactrim admit if febrile check CBC, tx plts, PRBC as indicated on 7/11/25 DLI planned for first week of 8/2025

## 2025-07-13 NOTE — HISTORY OF PRESENT ILLNESS
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  He is s/p cycle 1 decitabine and venetoclax. He had been well, exercising regularly, doing HIIT workouts, when he began to feel increasingly fatigued mid 6/2024. By early 7/2024, he had developed NGUYEN on climbing stairs. Pancytopenia with circulating blasts were detected and he was admitted to [facility] 7/18/2024. BMA/Bx:  Final Diagnosis Bone Marrow Biopsy, Clot and Bone Marrow Aspirate, Right PIC -MDS/AML with mutated TP53 ( per International Consensus   Classification of Myeloid Neoplasms and Acute Leukemias) -MDS with biallelic TP53 inactivation (WHO-KHCK6af ed). Hypercellular marrow for age with multilineage dysplasia, and increased blasts/immature cells (12% on aspirate differential  count, 10-15% by CD34 IHC stain). -Abnormal Karyotype 46-47,XY,del(7)(q11.2),+8,del(8)(q13q22)x2,del(9)(q13q22),-11,-12, add(17)(p11.2),del(20)(q11.2q13.3),+1-2mar      {cp13             }/46,XY      {7          } -FISH studies detected 7q deletion (60%), Trisomy 8  (62.5%), 20q deletion (59.5%) and TP53 deletion (55.5%). -[facility] Myeloid Report detected (Tier I) TP53 p.[id number] (VAF38%) and U2AF1 p.Kaf410Vwx (VAF32%). There are increased blasts/immature cells (12% on aspirate differential count and approximately 10-15% of the cellularity by CD34 immunohistochemical stain).  CT C/A/P showed small, non specific lung nodules and prostatomegaly.  Treatment with decitabine and venetoclax was started on 7/24/24 and whas been well tolerated. He was discharged home on antibiotic prophylaxis on 7/30. He has no GI c/o, mucositis, dysphagia; he is taking po well.  He has past h/o partial mastoidectomy and requires periodic left mastoid debridement.           [de-identified] : biallelic P53 mutation U2AF1 mutation complex karyotype monosomal karyotype [de-identified] : MDS/AML with biallelic P53 mut and complex karyotype [FreeTextEntry1] : decitabine and venetoclax  C 1 6/9/25 [de-identified] : AML s/p HSCT for AML day 0 _ 11/12/24  Day 180 post-transplant:5/6/25 BMA/Bx 5/6/25- Morphology: Normal cellularity with erythroid predominant trilineage hematopoiesis with maturation, increased hematogones, and increased iron stores (history of MDS/AML with mutated TP53, ICC/ MDS with biallelic TP53 inactivation, [id number] Karyotype: 46,XY,t(1;16)(q21;p11.2),del(7)(q11.2),+8,del(8)(q13q22)x2,del(9)(q13q22),-11,-12,add(17)(p11.2),del(20) (q11.2q13.3),+mar[cp2]//46,XX[19] FISH: shows 7q deletion, trisomy 8, TP53 deletion, and del 20q deletion, all below the cutoff values. ----NGS: Normal chimerism: 97.5% donor EBV, CMV PCR (-) on 7/1/25 s/p cycle of dac/leonila 6/9-6/13/25, day 30 mod fatigue severe pancytopenia, afebrile of ppx antibiotics

## 2025-07-15 NOTE — REVIEW OF SYSTEMS
[Negative] : Heme/Lymph [Fatigue] : fatigue [Fever] : no fever [Chills] : no chills [Night Sweats] : no night sweats [Recent Change In Weight] : ~T no recent weight change [Eye Pain] : no eye pain [Red Eyes] : eyes not red [Dry Eyes] : no dryness of the eyes [Vision Problems] : no vision problems [Nosebleeds] : no nosebleeds [Shortness Of Breath] : no shortness of breath [Wheezing] : no wheezing [Cough] : no cough [SOB on Exertion] : no shortness of breath during exertion [Vomiting] : no vomiting [Constipation] : no constipation [Dysuria] : no dysuria [Skin Rash] : no skin rash [Skin Wound] : no skin wound

## 2025-07-15 NOTE — ASSESSMENT
[FreeTextEntry1] : 73 yo with pmhx of AML, s/p HSCT transplant on 11/12/24. 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) ----------Ds status post transplant: cCR , now with MRD detected on BMBx 5/6/25 (FISH) ------------------By MRD monitoring: FISH ------------------By post Chimerism: full post transplant, now 70%D, 30%R (6/20/25) ----------Post transplant therapy: HMA/leonila (initiated 6/9/25) ------------------Indication: MRD detection on day +180 bmbx ----------DLI given: no, planned tentatively for Aug 5, 2025   Disease monitoring: --> Post transplant BMBx on Day +30 (completed 12/12/24), Day+ 100 (2/19/25) with SANJEEV --> Day +180 BMBx (5/6/25) significant for FISH with 7q deletion, trisomy 8, TP53 deletion, and del 20q deletion, all below the cutoff values and  complex karyotype in 1 cell out of 20; no blasts and normal NGS -->Given cytogenetic relapse, plan for treatment followed by DLI ------Re-referred to Dr Anderson for treatment, initiated dacogen/venetoclax C1D1 6/9/25 ----------Cont f/u and transfusions PRN per heme team ------[facility] ----------Cont tx until that time, coordinate collection/infusion dates with treatment cycle -------Discussed with pt, plan for bone marrow biopsy on 7/22 with DLI on 8/5 -------Pt and wife verbalize understanding of above plan  Engraftment: ------Chimerism: 11/26/24 >97% donor/recipient not detected. ------ANC engraftment date: 11/26/24 ------Platelets engraftment date: 11/29/24 ------ABO Rh incompatibility issues: none  -->Pancytopenia r/t chemotherapy +/- disease ------G-CSF support: no, HOLD given evidence of disease ----------Neutropenia ppx with posaconazole, levaquin ------Transfusions requirements: prn ------Chimerism full post transplant, cont interval monitoring past day +180 given MRD detected -------------Last sent 7/2/25 - 57% donor, 43% recipient  ------Immune reconstitution next due on day +365 -------------CD4 80 on 5/6/25 (day +180), cont PCP ppx until at least +1 year    GvHD: -------Acute: No -------Chronic: N/A -------Steroid Taper: N/A -------ECP: N/A  ------Prophylaxis with ABC regimen, completed ------Reviewed GvHD signs and symptoms to report, none at this time    ID: ---Monitoring: CMV, Adenovirus and EBV PCR q visit until day +180 ----------CMV: negative to date; monitor while on treatment ----------EBV: reactivation s/p Ruxience x4 (2/5-2/25/25), now not quantifiable as of 3/5/25; cont to monitor ---------Adeno: negative to date; routine monitoring no longer indicated  --->Continue ppx: ---------PCP: Bactrim ---------HSV and VZV: acyclovir ---------Fungal: posaconazole completed per SOP, now resumed in setting of neutropenia ---------CMV: letermovir  completed per SOP ---------SOS prophylaxis: Ursodiol  completed per SOP,  ---------If chronic GvHD present, encapsulated organism coverage: N/A  Survivorship: - 25-hydroxyclaciferol (day +80): sent on 2/19/25 (20.8), not supplemented; Sent on 4/23/25 (20.9), recommended supplementation - TSH (annually): - Ferritin (annually): - Fertility (annual, x2 if applicable): - Bone mineral densitometry (day +365 for women, men exposed to prolonged steroids and CNI): - Vaccinations         - HOLD BMT vaccines until 6mo post last rituximab per SOP, plan to initiate 8/26/25        - High dose flu vaccination annually, received on 3/19/25        - COVID vaccination per CDC guidelines, pt may obtain locally  Questions and concerns addressed. Reviewed signs and symptoms to report to clinic; patient has clinic and after hours number  RTC: 7/22 bone marrow biopsy 7/29 labs, provider visit with Jagdish Aleman (send TDTs) 8/5 DLI   *follow with Dr. Anderson's team for transfusions, hematology tiff Cooper NP  Adult Transplantation and Cellular Therapy Program [facility]  ---------------------------------------------------------------------------------------------------------------------------- I saw the patient on day +245 following his allogeneic HSCT. I reviewed the data and the above note.  Diagnosis: AML Disease staging at transplant: CR Conditioning regimen: LIV Bu Flu rATG GvHD prophylaxis: ABC Donor type: MUD  Stem cell source: Peripheral ABO  Recipient: A positive  Donor: O positive  Incompatibility: Minor CMV Status  Recipient: Positive  Donor: Negative Toxoplasmosis Status  Recipient: Negative  Donor: Negative  I saw the patient today. He has been feeling weak. He is receiving transfusion support.  His blood counts have not recovered yet.  The pan at this point is to repeat his BM studies next week and proceed with DLI accordingly. I answered the patient's questions. I spent a total of 20 minutes performing the above tasks.  MARISABEL Mena MD

## 2025-07-15 NOTE — HISTORY OF PRESENT ILLNESS
[Research Protocol] : Research Protocol  [80: Normal activity with effort; some signs or symptoms of disease.] : 80: Normal activity with effort; some signs or symptoms of disease.  [de-identified] : 71 year old s/p HSCT for AML    Status post allogeneic transplant, day +245 (11/12/24)  Transplant physician: Dr. Bay  Referring physician:  Diagnosis: AML  Disease staging at transplant: CR Conditioning regimen: LIV BuFluATG GvHD prophylaxis: PTCyBorAba (ABC) on study  Donor type: MUD                    Mismatches: N/A, 10/10                    Stem cell source: Peripheral  ABO         Recipient: A positive         Donor: O positive          Incompatibility: Minor  CMV Status  Recipient: Positive  Donor: Negative Toxoplasmosis Status  Recipient: Negative   Donor: Negative    PMH:  BPH, AML     -----------------Oncologic Hx:-------------------------------- He had been well, exercising regularly, three miles/day. By early 7/2024, he had developed NGUYEN on climbing stairs. Pancytopenia with circulating blasts were detected and he was admitted to [facility] 7/18/2024. 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 He was treated with decitabine and venetoclax x 3 cycles.    -----------Transplant hospital course:---------------------  Tolerated well, c/b increased BP due to post transplant cytoxan fluids. Additionally, c/b hematuria, interstitial mucositis.  -----Day 0 = 11/12/24  --------------Post Transplant Course:-----------------------   -------------Oncological treatments:-------------------------- Decitabine and venetoclax 100mg 21d   ---------------Bone Marrow Bx Results:---------------------- Bone Marrow Biopsy, Clot and Bone Marrow Aspirate, Right PIC -MDS/AML with mutated TP53 ( per International Consensus  Classification of Myeloid Neoplasms and Acute Leukemias) -MDS with biallelic TP53 inactivation (WHO-ZPVM9oc ed). Hypercellular marrow for age with multilineage dysplasia, and increased blasts/immature cells (12% on aspirate differential count, 10-15% by CD34 IHC stain). -Abnormal Karyotype 46-47,XY,del(7)(q11.2),+8,del(8)(q13q22)x2,del(9)(q13q22),-11,-12, add(17)(p11.2),del(20)(q11.2q13.3),+1-2mar                            {cp13                                                                             \}/46,XY                            {7                                                                             \} -FISH studies detected 7q deletion (60%), Trisomy 8 (62.5%), 20q deletion (59.5%) and TP53 deletion (55.5%). -[facility] Myeloid Report detected (Tier I) TP53 p.Bnu499Kcl (VAF38%) and U2AF1 p.Abb386Tmp (VAF32%). There are increased blasts/immature cells (12% on aspirate differential count and approximately 10-15% of the cellularity by CD34 immunohistochemical stain).  8/15/24:  Bone marrow biopsy and bone marrow aspirate  - Cellular marrow (30-40%) with markedly decreased myelopoiesis, decreased left shifted erythropoiesis and relatively normal megakaryopoiesis with no increase in blast population  - Persistent 7q deletion (1%), trisomy 8 (2.5%), 20q deletion (2.5%) and TP53 deletion (0.5%) by FISH studies, below the cut-off values  _ Persistent mutations in TP53 p.Ntq457Avw and U2AF1 p.Zwm261Gsi at low VAF levels  10/15/24 (Pretransplant BmBx) ----Morphology: Normocellular bone marrow with trilineage regeneration (focal myeloid predominant, focal erythroid predominant, overall decreased megakaryocytes with focally normal numbers and normal morphology, and increased iron stores (history of MDS/AML with complex karyotype and TP53 deletion, under treatment)      - No morphologic or immunophenotypic findings of bone marrow involvement by leukemia are identified ----Karyotype: Normal ----FISH: deletion of TP53 below cut off value  ----NGS: Normal    - Day 30 post transplant: completed 12/12/24  ----Morphology: SANJEEV ----Karyotype: //46,XX[20] ----FISH: Normal FISH ----NGS: [facility] Advanced NGS Myeloid Panel is normal. No mutations identified.    - Day 100 post transplant: completed 2/19/25 ----Morphology: Trilineage hematopoiesis with maturation and increased iron stores. No morphologic or immunophenotypic features of bone marrow involvement by MDS/AML are identified. ----Karyotype:46,XX[20] ----FISH: Normal Fish ----NGS:[facility] NGS panel- no mutations identified.   - Day 180 post transplant: completed 5/6/25 ----Morphology: Normal cellularity with erythroid predominant trilineage hematopoiesis with maturation, increased hematogones, and increased iron stores (history of MDS/AML with mutated TP53, ICC/ MDS with biallelic TP53 inactivation, [id number] ----Karyotype: complex in 1  cell out of 20  ----FISH: shows 7q deletion, trisomy 8, TP53 deletion, and del 20q deletion, all below the cutoff values.     ----NGS: Normal   ---------Past Surgical Hx---------- Partial mastoidectomy and requires periodic left mastoid debridement  -------- -Social Hx ----------------- The patient is  and has 2 daughters, 30 and 27y/o local in [place]  Patient has one brother (69)  Work: Nephrologist  Born in: [place]  Never a smoker/Social alcohol use  ----------Past Family Hx:------------ Noncontributory  [FreeTextEntry1] : Phase I-II Study of High-Dose Post-Transplant Cyclophosphamide, Bortezomib, and Abatacept for the Prevention of Uhzka-ahwndp-Dbev Disease (GvHD) following Allogeneic Hematopoietic Stem Cell Transplantation (HSCT) [de-identified] : 7/15/25: Presents for day +245, post-transplant f/u. He reports having increased overall fatigue. He continues to follow with Dr. Anderson, day 39. Plan for DLI on 8/5 and discussed plan for repeat marrow prior.    Denies fever/chills, headache, chest pain, palpitations, SOB, cough, N/V/D, abd pain, swelling, rash dry eye, difficulty swallowing, stiff joints, or pain. Meds reviewed, taking appropriately - remains on neutropenic prophylaxis.

## 2025-07-22 NOTE — REASON FOR VISIT
[Bone Marrow Biopsy] : bone marrow biopsy [Bone Marrow Aspiration] : bone marrow aspiration [Spouse] : spouse

## 2025-07-22 NOTE — PROCEDURE
[Bone Marrow Biopsy] : bone marrow biopsy [Bone Marrow Aspiration] : bone marrow aspiration  [Patient] : the patient [Verbal Consent Obtained] : verbal consent was obtained prior to the procedure [Patient identification verified] : patient identification verified [Procedure verified and consent obtained] : procedure verified and consent obtained [Laterality verified and correct site marked] : laterality verified and correct site marked [Right] : site: right [Correct positioning] : correct positioning [Prone] : prone [The right posterior iliac crest was prepped with betadine and draped, using sterile technique.] : The right posterior iliac crest was prepped with betadine and draped, using sterile technique. [Aspirate] : aspirate [Cytogenetics] : cytogenetics [FISH] : FISH [Biopsy] : biopsy [Flow Cytometry] : flow cytometry [] : The patient was instructed to remove the bandage the following AM. The patient may bathe. Acetaminophen may be taken for discomfort, as per package directions.If there are any other problems, the patient was instructed to call the office. The patient verbalized understanding, and is aware of the office contact numbers. [FreeTextEntry1] : Hx AML s/p allogeneic stem cell transplant [FreeTextEntry2] : Patient receive 10cc 2% lidocaine throughout procedure. He tolerated well, specimens obtained with no issues. Pt knows to keep area clean and intact. Pressure applied to site, no s/sx of bleeding post procedure. Pt and wife know to call with any concerns.